# Patient Record
Sex: FEMALE | Race: WHITE | NOT HISPANIC OR LATINO | Employment: FULL TIME | URBAN - METROPOLITAN AREA
[De-identification: names, ages, dates, MRNs, and addresses within clinical notes are randomized per-mention and may not be internally consistent; named-entity substitution may affect disease eponyms.]

---

## 2017-01-03 ENCOUNTER — TELEPHONE (OUTPATIENT)
Dept: NEUROLOGY | Facility: CLINIC | Age: 47
End: 2017-01-03

## 2017-01-03 NOTE — TELEPHONE ENCOUNTER
----- Message from Danitza Matta sent at 1/3/2017  3:19 PM CST -----  Contact: Patient herself  _  1st Request  X  2nd Request  _  3rd Request    Please refill the medication(s) listed below. Please call the patient when the prescription(s) is ready for  at the phone number [phone] .    Medication #1  Verpamil (CALAN - SR) 120 MG      Preferred Pharmacy:  [id number] - [place] - [id number] Replaced by [facility]

## 2017-01-20 ENCOUNTER — OFFICE VISIT (OUTPATIENT)
Dept: UROLOGY | Facility: CLINIC | Age: 47
End: 2017-01-20
Payer: COMMERCIAL

## 2017-01-20 ENCOUNTER — TELEPHONE (OUTPATIENT)
Dept: UROLOGY | Facility: CLINIC | Age: 47
End: 2017-01-20

## 2017-01-20 VITALS
HEART RATE: 70 BPM | SYSTOLIC BLOOD PRESSURE: 137 MMHG | WEIGHT: 203.94 LBS | BODY MASS INDEX: 33.98 KG/M2 | DIASTOLIC BLOOD PRESSURE: 96 MMHG | HEIGHT: 65 IN

## 2017-01-20 DIAGNOSIS — N30.10 INTERSTITIAL CYSTITIS: Primary | ICD-10-CM

## 2017-01-20 PROCEDURE — 99213 OFFICE O/P EST LOW 20 MIN: CPT | Mod: 25,S$GLB,, | Performed by: UROLOGY

## 2017-01-20 PROCEDURE — 99999 PR PBB SHADOW E&M-EST. PATIENT-LVL III: CPT | Mod: PBBFAC,,, | Performed by: UROLOGY

## 2017-01-20 PROCEDURE — 81002 URINALYSIS NONAUTO W/O SCOPE: CPT | Mod: S$GLB,,, | Performed by: UROLOGY

## 2017-01-20 NOTE — PROGRESS NOTES
CHIEF COMPLAINT:    Mrs. Bonilla is a 46 y.o. female presenting for a follow up on IC.    PRESENTING ILLNESS:    Jennifer Bonilla is a 46 y.o. female who was last seen in 2/2015, returns today stating that she had a strep throat in November, then had enteritis.  She then developed a UTI, was treated with Macrobid at the end of December.  Last week, she had another UTI (no cultures have been done) and is again on Macrobid.  She has resolution of the dysuria, frequency, urgency and pain when she takes the antibiotic.  Is has been several years since she has had a cystoscopy with hydrodistension and she would like to have another.  She states that the Macrobid has helped her symptoms such that she does not feel like she needs an instillation today.  (When she first had IC, she used to do instillations at home.)  She is a surgical tech and works long hours, feels that her bladder is more comfortable when she is able to drink water but finds it challenging with the work schedule.        REVIEW OF SYSTEMS:    Review of Systems   Constitutional: Negative.    HENT: Negative.    Eyes: Negative.    Respiratory: Negative.    Cardiovascular: Negative.    Gastrointestinal: Positive for abdominal pain.   Genitourinary: Positive for dysuria, frequency and urgency.        Suprapubic tenderness   Musculoskeletal: Positive for myalgias.        Foot pain and leg pain from standing all day   Skin: Negative.    Neurological: Negative.    Endo/Heme/Allergies: Does not bruise/bleed easily.   Psychiatric/Behavioral: Negative.      PATIENT HISTORY:    Past Medical History   Diagnosis Date    Acid reflux     Arrhythmia     Cystitis, interstitial     TMJ (temporomandibular joint disorder)      jaw clicking       Past Surgical History   Procedure Laterality Date    Appendectomy      Cholecystectomy      Exploratory laparotomy w/ bowel resection       no surgery on bowels. x3 separate laparoscopies    Cystoscopy      Abdominal surgery       Radiofrequency ablation         Family History   Problem Relation Age of Onset    Cancer Father     Cancer Mother      Social History    Marital status:      Spouse name: N/A    Number of children: N/A    Years of education: N/A     Occupational History     [employer]     Social History Main Topics    Smoking status: Never Smoker    Smokeless tobacco: Never Used    Alcohol use 1.5 oz/week     3 Standard drinks or equivalent per week      Comment: socially    Drug use: No    Sexual activity: Yes     Partners: Male     Social History Narrative    Works at surgery at [employer].        Allergies:    Codeine and Capsicum    Medications:  Outpatient Encounter Prescriptions as of 1/20/2017   Medication Sig Dispense Refill    ondansetron (ZOFRAN-ODT) 4 MG TbDL Take 1 tablet (4 mg total) by mouth every 12 (twelve) hours as needed (nausea). 30 tablet 6    verapamil (CALAN-SR) 120 MG CR tablet TAKE ONE TABLET BY MOUTH IN THE EVENING 30 tablet 0    scopolamine (TRANSDERM-SCOP) 1.5 mg (1 mg over 3 days) Place 1 patch (1.5 mg total) onto the skin every 72 hours. 7 patch 0     No facility-administered encounter medications on file as of 1/20/2017.          PHYSICAL EXAMINATION:    The patient generally appears in good health, is appropriately interactive, and is in no apparent distress.    Skin: No lesions.    Mental: Cooperative with normal affect.    Neuro: Grossly intact.    HEENT: Normal. No evidence of lymphadenopathy.    Chest: normal inspiratory effort.    Extremities: No clubbing, cyanosis, or edema      LABS:    UA 1.015, pH 5, otherwise, negative    IMPRESSION:    Encounter Diagnoses   Name Primary?    Interstitial cystitis Yes       PLAN:    1.  Consent signed for cystoscopy, hydrodistension, possible bladder biopsy and fulguration.

## 2017-01-23 ENCOUNTER — ANESTHESIA EVENT (OUTPATIENT)
Dept: SURGERY | Facility: HOSPITAL | Age: 47
End: 2017-01-23
Payer: COMMERCIAL

## 2017-01-23 NOTE — ANESTHESIA PREPROCEDURE EVALUATION
Pre Admission Screening  Melba Hopkins RN      []Hide copied text  Anesthesia Assessment: Preoperative EQUATION     Planned Procedure: Procedure(s) (LRB):  CYSTOSCOPY W/BLADDER BIOPSY,POSSIBLE FULGURATION-BLADDER (N/A)  CYSTOSCOPY WITH HYDRODISTENSION (N/A)  Requested Anesthesia Type:General  Surgeon: Enid Lester MD  Service: Urology  Known or anticipated Date of Surgery:1/31/2017     Surgeon notes: reviewed     Electronic QUestionnaire Assessment completed via nurse interview with patient.                    Jennifer Bonilla [[id number]] - 46 y.o. Female         Providers Outside of [facility]       No data to display        Surgical Risk Level       Surgical Risk Level:   2              caRDScore (Clinical Anesthesia Rapid Decision Score)         Very Low  Total Score: 8       8 Sum of Clinical Scores        caRDScores (Grouped)       caRDScore - Ane:   3                       caRDScore - CVD:   1                       caRDScore - Pul:   0                       caRDScore - Met:   0                       caRDScore - Phy:   4              caRDScore Items             Pre-admit from 1/31/2017 in [facility]      Anesthesia        Has decreased jaw mobility/TMJ dysfunction   Yes      Has GERD, hiatal hernia, or chronic heartburn/dyspepsia requiring Rx some or all times   Yes      CVD        Activity similar to best ability for maximal activity or exercise   METS 4      Diagnosed with high blood pressure   Yes      Typical BP runs <150/90   Yes      Pulmonary        Metabolic        Physiologic        Has acute infection now / taking antibiotics   Yes      Has recent Dx of urine infection   Yes [on macrobid to prevent recurrent UTI]      Obesity Status   Mild Obesity (BMI 30-34.9)      Has problems emptying bladder/ u. retent. due to nerve/prostate/bladder/pelvic dis.   Yes        Flags       Red Flag Score:   0                       Yellow Flag Score:   4              Red Flags              Pre-admit from 1/31/2017 in [facility]      Obesity Status   Mild Obesity (BMI 30-34.9)        Yellow Flags             Pre-admit from 1/31/2017 in [facility]      Has acute infection now / taking antibiotics   Yes      Has recent Dx of urine infection   Yes [on macrobid to prevent recurrent UTI]      Obesity Status   Mild Obesity (BMI 30-34.9)      Has decreased jaw mobility/TMJ dysfunction   Yes      Has pain   Yes        PONV Risk Score (assumes periop narcotic use = +1, Max=4)       PONV Risk Score:   3              PONV Risk Factors  Total Score: 2       1 Female      1 Non-Smoker at present        Sleep Apnea  Total Score: 0         ESTELLE STOP-Bang Risk Factors (Max=8)  Total Score: 1       1 Takes medication for high blood pressure        ESTELLE Risk Level - 1 (Low), 2 (Moderate), 3 (High)       ESTELLE Risk Level:   1              RCRI (Revised Cardiac Risk Indices of ACC/AHA guidelines, Max=6)  Total Score: 0         CAD Risk Factors  Total Score: 1       1 Diagnosed with high blood pressure        CHADS Score if applicable (history of atrial fib/flutter, Max=6)  Total Score: 1       1 Diagnosed with high blood pressure        Maximal Exercise Capacity             Pre-admit from 1/31/2017 in [facility]      Maximal Exercise Capacity   METS 4        Summary of Dependence  Total Score: 1       1 Is totally independent of others for activities of daily living        Phone Fraility Score (Max = 17)  Total Score: 0         Pain Factors             Pre-admit from 1/31/2017 in [facility]      Has pain   Yes      Location and description of pain   occ migraines        Risk Triggers (Evidence-Based Risk Triggers)         Pulmonary Risk Triggers  Total Score: 1       1 Obesity Status: Mild Obesity (BMI 30-34.9)        Renal Risk Triggers  Total Score: 1       1 Diagnosed with high blood pressure        Delirium Risk Triggers  Total Score: 0          Urologic Risk Triggers  Total Score: 2       1 Has recent Dx of urine infection      1 Has problems emptying bladder/ u. retent. due to nerve/prostate/bladder/pelvic dis.        Logistics         Pre-op Clinic Logistics  Total Score: 4       2 Has recent Dx of urine infection      1 Has had anesthesia, either as adult or as a child      1 Takes medication for high blood pressure        DOSC Logistics  Total Score: 0         Discharge Logistics  Total Score: 0         Discharge Planning             Pre-admit from 1/31/2017 in [facility]      Discharge Planning        Will assist patient 24/7, if needed         Who will transport you to therapy, if need   [facility]        Fast Track <For office use only>       Total Score: 7          Surgical Risk Level Assessment                       Triage considerations:      The patient has no apparent active cardiac condition (No unstable coronary Syndrome such as severe unstable angina or recent [<1 month] myocardial infarction, decompensated CHF, severe valvular disease or significant arrhythmia)     Previous anesthesia records:GETA, LMA General and No problems- Pt has TMJ.  Endometrial ablation:  08/19/13; Placement Time: 0741; Method of Intubation: Other (Comment); Inserted by: CRNA; Airway Device: LMA; Mask Ventilation: Easy; Intubated: Postinduction; Airway Device Size: 4.0; Style: Cuffed; Cuff Inflation: Minimal occlusive pressure; Placement Verified By: Auscultation, Capnometry; Grade: Grade I; Intubation Findings: Positive EtCO2, Bilateral breath sounds; Securment: Lips; Complications: None; Breath Sounds: Equal Bilateral; Insertion Attempts: 1; Removal Date: 08/19/13;      Last PCP note: > 1 year ago , within [facility]   Subspecialty notes: Cardiology: General, Neurology     Other important co-morbidities: obesity, HX PVCs (WPW and Brugada syndrome were ruled out), GERD      Tests already available:  Available tests, 6-12 months ago . 3/25 CBC,  [facility]. 2014 EKG      Instructions given. (See in Nurse's note)     Optimization:  Anesthesia Preop Clinic Assessment Indicated-not required for fast track pt      Plan:   Testing: [facility]  Patient has previously scheduled Medical Appointment:none     Navigation: Tests Scheduled. Am of surgery      Straight Line to surgery.       Electronically signed by Melba Hopkins RN at 1/23/2017  3:08 PM        Pre-admit on 1/31/2017              Detailed Report                                                                                                                        01/23/2017  Jennifer Bonilla is a 46 y.o., female.    [facility] Anesthesia Evaluation         Review of Systems      Physical Exam  General:  Well nourished    Airway/Jaw/Neck:  Airway Findings: Mouth Opening: Normal Tongue: Normal  General Airway Assessment: Adult  Mallampati: II  Improves to II with phonation.  TM Distance: Normal, at least 6 cm      Dental:  Dental Findings: In tact   Chest/Lungs:  Chest/Lungs Findings: Clear to auscultation     Heart/Vascular:  Heart Findings: Rate: Normal  Rhythm: Regular Rhythm  Sounds: Normal        Mental Status:  Mental Status Findings:  Cooperative, Alert and Oriented         Anesthesia Plan  Type of Anesthesia, risks & benefits discussed:  Anesthesia Type:  general  Patient's Preference: General  Intra-op Monitoring Plan: standard ASA monitors  Intra-op Monitoring Plan Comments:   Post Op Pain Control Plan:   Post Op Pain Control Plan Comments: Per primary service.   Induction:   IV  Beta Blocker:  Patient is not currently on a Beta-Blocker (No further documentation required).       Informed Consent: Patient understands risks and agrees with Anesthesia plan.  Questions answered. Anesthesia consent signed with patient.  ASA Score: 2     Day of Surgery Review of History & Physical:    H&P update referred to the surgeon.     Anesthesia Plan Notes: Monitoring and airway management plans reviewed.         Ready For Surgery  From Anesthesia Perspective.     Patient Active Problem List   Diagnosis    Menorrhagia    Palpitations    Pharyngitis    RBBB    Interstitial cystitis    TMJ (temporomandibular joint syndrome)

## 2017-01-23 NOTE — PRE ADMISSION SCREENING
Anesthesia Assessment: Preoperative EQUATION    Planned Procedure: Procedure(s) (LRB):  CYSTOSCOPY W/BLADDER BIOPSY,POSSIBLE FULGURATION-BLADDER (N/A)  CYSTOSCOPY WITH HYDRODISTENSION (N/A)  Requested Anesthesia Type:General  Surgeon: Enid Lester MD  Service: Urology  Known or anticipated Date of Surgery:1/31/2017    Surgeon notes: reviewed    Electronic QUestionnaire Assessment completed via nurse interview with patient.        Jennifer Bonilla [[id number]] - 46 y.o. Female        Providers Outside of [facility]      No data to display       Surgical Risk Level      Surgical Risk Level:  2           caRDScore (Clinical Anesthesia Rapid Decision Score)        Very Low  Total Score: 8      8 Sum of Clinical Scores       caRDScores (Grouped)      caRDScore - Ane:  3                caRDScore - CVD:  1                caRDScore - Pul:  0                caRDScore - Met:  0                caRDScore - Phy:  4           caRDScore Items           Pre-admit from 1/31/2017 in [facility]     Anesthesia      Has decreased jaw mobility/TMJ dysfunction  Yes     Has GERD, hiatal hernia, or chronic heartburn/dyspepsia requiring Rx some or all times  Yes     CVD      Activity similar to best ability for maximal activity or exercise  METS 4     Diagnosed with high blood pressure  Yes     Typical BP runs <150/90  Yes     Pulmonary      Metabolic      Physiologic      Has acute infection now / taking antibiotics  Yes     Has recent Dx of urine infection  Yes [on macrobid to prevent recurrent UTI]     Obesity Status  Mild Obesity (BMI 30-34.9)     Has problems emptying bladder/ u. retent. due to nerve/prostate/bladder/pelvic dis.  Yes       Flags      Red Flag Score:  0                Yellow Flag Score:  4           Red Flags           Pre-admit from 1/31/2017 in [facility]     Obesity Status  Mild Obesity (BMI 30-34.9)       Yellow Flags           [facility]     Has acute infection now / taking antibiotics  Yes     Has recent Dx of urine infection  Yes [on macrobid to prevent recurrent UTI]     Obesity Status  Mild Obesity (BMI 30-34.9)     Has decreased jaw mobility/TMJ dysfunction  Yes     Has pain  Yes       PONV Risk Score (assumes periop narcotic use = +1, Max=4)      PONV Risk Score:  3           PONV Risk Factors  Total Score: 2      1 Female     1 Non-Smoker at present       Sleep Apnea  Total Score: 0        ESTELLE STOP-Bang Risk Factors (Max=8)  Total Score: 1      1 Takes medication for high blood pressure       ESTELLE Risk Level - 1 (Low), 2 (Moderate), 3 (High)      ESTELLE Risk Level:  1           RCRI (Revised Cardiac Risk Indices of ACC/AHA guidelines, Max=6)  Total Score: 0        CAD Risk Factors  Total Score: 1      1 Diagnosed with high blood pressure       CHADS Score if applicable (history of atrial fib/flutter, Max=6)  Total Score: 1      1 Diagnosed with high blood pressure       Maximal Exercise Capacity           Pre-admit from 1/31/2017 in [facility]     Maximal Exercise Capacity  METS 4       Summary of Dependence  Total Score: 1      1 Is totally independent of others for activities of daily living       Phone Fraility Score (Max = 17)  Total Score: 0        Pain Factors           Pre-admit from 1/31/2017 in [facility]     Has pain  Yes     Location and description of pain  occ migraines       Risk Triggers (Evidence-Based Risk Triggers)        Pulmonary Risk Triggers  Total Score: 1      1 Obesity Status: Mild Obesity (BMI 30-34.9)       Renal Risk Triggers  Total Score: 1      1 Diagnosed with high blood pressure       Delirium Risk Triggers  Total Score: 0        Urologic Risk Triggers  Total Score: 2      1 Has recent Dx of urine infection     1 Has problems emptying bladder/ u. retent. due to nerve/prostate/bladder/pelvic dis.       Logistics        Pre-op Clinic Logistics  Total Score: 4       2 Has recent Dx of urine infection     1 Has had anesthesia, either as adult or as a child     1 Takes medication for high blood pressure       DOSC Logistics  Total Score: 0        Discharge Logistics  Total Score: 0        Discharge Planning           Pre-admit from 1/31/2017 in [facility]     Discharge Planning      Will assist patient 24/7, if needed       [facility], [facility]       Fast Track <For office use only>      Total Score: 7        Surgical Risk Level Assessment                 Triage considerations:     The patient has no apparent active cardiac condition (No unstable coronary Syndrome such as severe unstable angina or recent [<1 month] myocardial infarction, decompensated CHF, severe valvular   disease or significant arrhythmia)    Previous anesthesia records:GETA, LMA General and No problems- Pt has TMJ.  Endometrial ablation:  08/19/13; Placement Time: 0741; Method of Intubation: Other (Comment); Inserted by: CRNA; Airway Device: LMA; Mask Ventilation: Easy; Intubated: Postinduction; Airway Device Size: 4.0; Style: Cuffed; Cuff Inflation: Minimal occlusive pressure; Placement Verified By: Auscultation, Capnometry; Grade: Grade I; Intubation Findings: Positive EtCO2, Bilateral breath sounds; Securment: Lips; Complications: None; Breath Sounds: Equal Bilateral; Insertion Attempts: 1; Removal Date: 08/19/13;     Last PCP note: > 1 year ago , within [facility]   Subspecialty notes: Cardiology: General, Neurology    Other important co-morbidities: obesity, HX PVCs  (WPW and Brugada syndrome were ruled out), GERD     Tests already available:  Available tests,  6-12 months ago . 3/25 CBC, BMP. 2014 EKG            Instructions given. (See in Nurse's note)    Optimization:  Anesthesia Preop Clinic Assessment  Indicated-not required for fast track pt       Plan:    Testing:  BMP      Patient  has previously scheduled Medical  Appointment:none    Navigation: Tests Scheduled. Am of surgery                         Straight Line to surgery.

## 2017-01-30 ENCOUNTER — TELEPHONE (OUTPATIENT)
Dept: UROLOGY | Facility: CLINIC | Age: 47
End: 2017-01-30

## 2017-01-31 ENCOUNTER — HOSPITAL ENCOUNTER (OUTPATIENT)
Facility: HOSPITAL | Age: 47
Discharge: HOME | End: 2017-01-31
Attending: UROLOGY | Admitting: UROLOGY
Payer: COMMERCIAL

## 2017-01-31 ENCOUNTER — ANESTHESIA (OUTPATIENT)
Dept: SURGERY | Facility: HOSPITAL | Age: 47
End: 2017-01-31
Payer: COMMERCIAL

## 2017-01-31 ENCOUNTER — SURGERY (OUTPATIENT)
Age: 47
End: 2017-01-31

## 2017-01-31 VITALS
HEART RATE: 58 BPM | BODY MASS INDEX: 32.99 KG/M2 | RESPIRATION RATE: 16 BRPM | SYSTOLIC BLOOD PRESSURE: 153 MMHG | OXYGEN SATURATION: 100 % | HEIGHT: 65 IN | DIASTOLIC BLOOD PRESSURE: 92 MMHG | TEMPERATURE: 97 F | WEIGHT: 198 LBS

## 2017-01-31 DIAGNOSIS — N30.10 INTERSTITIAL CYSTITIS: ICD-10-CM

## 2017-01-31 LAB
B-HCG UR QL: NEGATIVE
CTP QC/QA: YES

## 2017-01-31 PROCEDURE — 36000707: Performed by: UROLOGY

## 2017-01-31 PROCEDURE — 27200971 HC CYSTO SUPPLY II (SCOPE PRCDR.): Performed by: UROLOGY

## 2017-01-31 PROCEDURE — D9220A PRA ANESTHESIA: Mod: ANES,,, | Performed by: ANESTHESIOLOGY

## 2017-01-31 PROCEDURE — 36000706: Performed by: UROLOGY

## 2017-01-31 PROCEDURE — 52260 CYSTOSCOPY AND TREATMENT: CPT | Mod: ,,, | Performed by: UROLOGY

## 2017-01-31 PROCEDURE — 25000003 PHARM REV CODE 250: Performed by: STUDENT IN AN ORGANIZED HEALTH CARE EDUCATION/TRAINING PROGRAM

## 2017-01-31 PROCEDURE — 27200921 HC BAG, CYSTO DRAINAGE: Performed by: UROLOGY

## 2017-01-31 PROCEDURE — 37000009 HC ANESTHESIA EA ADD 15 MINS: Performed by: UROLOGY

## 2017-01-31 PROCEDURE — 25000003 PHARM REV CODE 250: Performed by: NURSE ANESTHETIST, CERTIFIED REGISTERED

## 2017-01-31 PROCEDURE — 25000003 PHARM REV CODE 250: Performed by: UROLOGY

## 2017-01-31 PROCEDURE — 63600175 PHARM REV CODE 636 W HCPCS: Performed by: NURSE ANESTHETIST, CERTIFIED REGISTERED

## 2017-01-31 PROCEDURE — 81025 URINE PREGNANCY TEST: CPT | Performed by: UROLOGY

## 2017-01-31 PROCEDURE — 37000008 HC ANESTHESIA 1ST 15 MINUTES: Performed by: UROLOGY

## 2017-01-31 PROCEDURE — 71000015 HC POSTOP RECOV 1ST HR: Performed by: UROLOGY

## 2017-01-31 PROCEDURE — D9220A PRA ANESTHESIA: Mod: CRNA,,, | Performed by: NURSE ANESTHETIST, CERTIFIED REGISTERED

## 2017-01-31 PROCEDURE — 27200651 HC AIRWAY, LMA: Performed by: NURSE ANESTHETIST, CERTIFIED REGISTERED

## 2017-01-31 PROCEDURE — 71000033 HC RECOVERY, INTIAL HOUR: Performed by: UROLOGY

## 2017-01-31 RX ORDER — FENTANYL CITRATE 50 UG/ML
INJECTION, SOLUTION INTRAMUSCULAR; INTRAVENOUS
Status: DISCONTINUED | OUTPATIENT
Start: 2017-01-31 | End: 2017-01-31

## 2017-01-31 RX ORDER — HYDROCODONE BITARTRATE AND ACETAMINOPHEN 5; 325 MG/1; MG/1
1-2 TABLET ORAL
Qty: 15 TABLET | Refills: 0 | Status: SHIPPED | OUTPATIENT
Start: 2017-01-31 | End: 2017-01-31 | Stop reason: HOSPADM

## 2017-01-31 RX ORDER — LIDOCAINE HCL/PF 100 MG/5ML
SYRINGE (ML) INTRAVENOUS
Status: DISCONTINUED | OUTPATIENT
Start: 2017-01-31 | End: 2017-01-31

## 2017-01-31 RX ORDER — PROPOFOL 10 MG/ML
VIAL (ML) INTRAVENOUS
Status: DISCONTINUED | OUTPATIENT
Start: 2017-01-31 | End: 2017-01-31

## 2017-01-31 RX ORDER — LIDOCAINE HYDROCHLORIDE 10 MG/ML
INJECTION, SOLUTION EPIDURAL; INFILTRATION; INTRACAUDAL; PERINEURAL
Status: DISCONTINUED
Start: 2017-01-31 | End: 2017-01-31 | Stop reason: HOSPADM

## 2017-01-31 RX ORDER — POLYETHYLENE GLYCOL 3350 17 G/17G
17 POWDER, FOR SOLUTION ORAL DAILY
Qty: 30 PACKET | Refills: 0 | Status: SHIPPED | OUTPATIENT
Start: 2017-01-31 | End: 2017-01-31 | Stop reason: HOSPADM

## 2017-01-31 RX ORDER — ONDANSETRON 2 MG/ML
INJECTION INTRAMUSCULAR; INTRAVENOUS
Status: DISCONTINUED | OUTPATIENT
Start: 2017-01-31 | End: 2017-01-31

## 2017-01-31 RX ORDER — HYDROCODONE BITARTRATE AND ACETAMINOPHEN 5; 325 MG/1; MG/1
1 TABLET ORAL EVERY 4 HOURS PRN
Status: DISCONTINUED | OUTPATIENT
Start: 2017-01-31 | End: 2017-01-31

## 2017-01-31 RX ORDER — LIDOCAINE HYDROCHLORIDE 10 MG/ML
1 INJECTION, SOLUTION EPIDURAL; INFILTRATION; INTRACAUDAL; PERINEURAL ONCE
Status: DISCONTINUED | OUTPATIENT
Start: 2017-01-31 | End: 2017-01-31 | Stop reason: HOSPADM

## 2017-01-31 RX ORDER — LIDOCAINE HYDROCHLORIDE 20 MG/ML
JELLY TOPICAL
Status: DISCONTINUED | OUTPATIENT
Start: 2017-01-31 | End: 2017-01-31 | Stop reason: HOSPADM

## 2017-01-31 RX ORDER — SODIUM CHLORIDE 9 MG/ML
INJECTION, SOLUTION INTRAVENOUS CONTINUOUS
Status: DISCONTINUED | OUTPATIENT
Start: 2017-01-31 | End: 2017-01-31 | Stop reason: HOSPADM

## 2017-01-31 RX ADMIN — FENTANYL CITRATE 25 MCG: 50 INJECTION, SOLUTION INTRAMUSCULAR; INTRAVENOUS at 06:01

## 2017-01-31 RX ADMIN — FENTANYL CITRATE 25 MCG: 50 INJECTION, SOLUTION INTRAMUSCULAR; INTRAVENOUS at 07:01

## 2017-01-31 RX ADMIN — ONDANSETRON 4 MG: 2 INJECTION INTRAMUSCULAR; INTRAVENOUS at 06:01

## 2017-01-31 RX ADMIN — LIDOCAINE HYDROCHLORIDE 10 ML: 20 JELLY TOPICAL at 06:01

## 2017-01-31 RX ADMIN — Medication 2 G: at 07:01

## 2017-01-31 RX ADMIN — SODIUM CHLORIDE: 0.9 INJECTION, SOLUTION INTRAVENOUS at 06:01

## 2017-01-31 RX ADMIN — LIDOCAINE HYDROCHLORIDE 60 MG: 20 INJECTION, SOLUTION INTRAVENOUS at 07:01

## 2017-01-31 RX ADMIN — PROPOFOL 200 MG: 10 INJECTION, EMULSION INTRAVENOUS at 07:01

## 2017-01-31 NOTE — INTERVAL H&P NOTE
The patient has been examined and the H&P has been reviewed:    I concur with the findings and no changes have occurred since H&P was written.   To OR today for cystoscopy, hydrodistension, possible bladder biopsy and fulguration.    Anesthesia/Surgery risks, benefits and alternative options discussed and understood by patient/family.          Active Hospital Problems    Diagnosis  POA    Interstitial cystitis [N30.10]  Yes      Resolved Hospital Problems    Diagnosis Date Resolved POA   No resolved problems to display.     Patient seen in holding.  No changes in clinical condition.  Proceed with planned procedure.

## 2017-01-31 NOTE — PROGRESS NOTES
Dr. Gee stated pt did not need bmp that was not drawn in preop. Pt does not want any Rxs for narcotics for pain.

## 2017-01-31 NOTE — H&P (VIEW-ONLY)
CHIEF COMPLAINT:    Mrs. Bonilla is a 46 y.o. female presenting for a follow up on IC.    PRESENTING ILLNESS:    Jennifer Bonilla is a 46 y.o. female who was last seen in 2/2015, returns today stating that she had a strep throat in November, then had enteritis.  She then developed a UTI, was treated with Macrobid at the end of December.  Last week, she had another UTI (no cultures have been done) and is again on Macrobid.  She has resolution of the dysuria, frequency, urgency and pain when she takes the antibiotic.  Is has been several years since she has had a cystoscopy with hydrodistension and she would like to have another.  She states that the Macrobid has helped her symptoms such that she does not feel like she needs an instillation today.  (When she first had IC, she used to do instillations at home.)  She is a surgical tech and works long hours, feels that her bladder is more comfortable when she is able to drink water but finds it challenging with the work schedule.        REVIEW OF SYSTEMS:    Review of Systems   Constitutional: Negative.    HENT: Negative.    Eyes: Negative.    Respiratory: Negative.    Cardiovascular: Negative.    Gastrointestinal: Positive for abdominal pain.   Genitourinary: Positive for dysuria, frequency and urgency.        Suprapubic tenderness   Musculoskeletal: Positive for myalgias.        Foot pain and leg pain from standing all day   Skin: Negative.    Neurological: Negative.    Endo/Heme/Allergies: Does not bruise/bleed easily.   Psychiatric/Behavioral: Negative.      PATIENT HISTORY:    Past Medical History   Diagnosis Date    Acid reflux     Arrhythmia     Cystitis, interstitial     TMJ (temporomandibular joint disorder)      jaw clicking       Past Surgical History   Procedure Laterality Date    Appendectomy      Cholecystectomy      Exploratory laparotomy w/ bowel resection       no surgery on bowels. x3 separate laparoscopies    Cystoscopy      Abdominal surgery       Radiofrequency ablation         Family History   Problem Relation Age of Onset    Cancer Father     Cancer Mother      Social History    Marital status:      Spouse name: N/A    Number of children: N/A    Years of education: N/A     Occupational History     [facility] [employer]     Social History Main Topics    Smoking status: Never Smoker    Smokeless tobacco: Never Used    Alcohol use 1.5 oz/week     3 Standard drinks or equivalent per week      Comment: socially    Drug use: No    Sexual activity: Yes     Partners: Male     Social History Narrative    Works at surgery at [employer].        Allergies:    Codeine and Capsicum    Medications:  Outpatient Encounter Prescriptions as of 1/20/2017   Medication Sig Dispense Refill    ondansetron (ZOFRAN-ODT) 4 MG TbDL Take 1 tablet (4 mg total) by mouth every 12 (twelve) hours as needed (nausea). 30 tablet 6    verapamil (CALAN-SR) 120 MG CR tablet TAKE ONE TABLET BY MOUTH IN THE EVENING 30 tablet 0    scopolamine (TRANSDERM-SCOP) 1.5 mg (1 mg over 3 days) Place 1 patch (1.5 mg total) onto the skin every 72 hours. 7 patch 0     No facility-administered encounter medications on file as of 1/20/2017.          PHYSICAL EXAMINATION:    The patient generally appears in good health, is appropriately interactive, and is in no apparent distress.    Skin: No lesions.    Mental: Cooperative with normal affect.    Neuro: Grossly intact.    HEENT: Normal. No evidence of lymphadenopathy.    Chest: normal inspiratory effort.    Extremities: No clubbing, cyanosis, or edema      LABS:    UA 1.015, pH 5, otherwise, negative    IMPRESSION:    Encounter Diagnoses   Name Primary?    Interstitial cystitis Yes       PLAN:    1.  Consent signed for cystoscopy, hydrodistension, possible bladder biopsy and fulguration.

## 2017-01-31 NOTE — ANESTHESIA RELEASE NOTE
Anesthesia Release from PACU Note    Patient: Jennifer Bonilla    Procedure(s) Performed: Procedure(s) (LRB):  CYSTOSCOPY WITH HYDRODISTENSION (N/A)    Anesthesia type: general    Post pain: Adequate analgesia    Post assessment: no apparent anesthetic complications, tolerated procedure well and no evidence of recall    Post vital signs:   Vitals:    01/31/17 0800   BP: 133/67   Pulse: (!) 59   Resp: 16   Temp:         Level of consciousness: awake, alert  and oriented    Nausea/Vomiting: no nausea/no vomiting    Complications: none    Airway Patency: patent    Respiratory: unassisted, spontaneous ventilation    Cardiovascular: stable and blood pressure at baseline    Hydration: euvolemic

## 2017-01-31 NOTE — ANESTHESIA POSTPROCEDURE EVALUATION
"Anesthesia Post Evaluation    Patient: Jennifer Bonilla    Procedure(s) Performed: Procedure(s) (LRB):  CYSTOSCOPY WITH HYDRODISTENSION (N/A)    Final Anesthesia Type: general  Patient location during evaluation: PACU  Patient participation: Yes- Able to Participate  Level of consciousness: awake and alert  Post-procedure vital signs: reviewed and stable  Pain management: adequate  Airway patency: patent  PONV status at discharge: No PONV  Anesthetic complications: no      Cardiovascular status: hemodynamically stable  Respiratory status: unassisted  Hydration status: euvolemic  Follow-up not needed.        Visit Vitals    /67    Pulse (!) 59    Temp 36.2 °C (97.2 °F) (Skin)    Resp 16    Ht 5' 5" (1.651 m)    Wt 89.8 kg (198 lb)    SpO2 100%    Breastfeeding No    BMI 32.95 kg/m2       Pain/Tequila Score: Pain Assessment Performed: Yes (1/31/2017  7:32 AM)  Presence of Pain: denies (1/31/2017  7:32 AM)  Teuqila Score: 9 (1/31/2017  7:32 AM)      "

## 2017-01-31 NOTE — IP AVS SNAPSHOT
[facility]  [address]  [facility] [address]  Phone: [phone]           Patient Discharge Instructions     Our goal is to set you up for success. This packet includes information on your condition, medications, and your home care. It will help you to care for yourself so you don't get sicker and need to go back to the hospital.     Please ask your nurse if you have any questions.        There are many details to remember when preparing to leave the hospital. Here is what you will need to do:    1. Take your medicine. If you are prescribed medications, review your Medication List in the following pages. You may have new medications to  at the pharmacy and others that you'll need to stop taking. Review the instructions for how and when to take your medications. Talk with your doctor or nurses if you are unsure of what to do.     2. Go to your follow-up appointments. Specific follow-up information is listed in the following pages. Your may be contacted by a transition nurse or clinical provider about future appointments. Be sure we have all of the phone numbers to reach you, if needed. Please contact your provider's office if you are unable to make an appointment.     3. Watch for warning signs. Your doctor or nurse will give you detailed warning signs to watch for and when to call for assistance. These instructions may also include educational information about your condition. If you experience any of warning signs to your health, call your doctor.               [facility]  Unless otherwise directed by your provider, please contact [facility], our nurse care line that is available for 24/7 assistance.     [phone] (toll-free)    Registered nurses in the [facility] provide clinical advisement, health education, appointment booking, and other advisory services.                    ** Verify the list of medication(s) below is accurate and up  to date. Carry this with you in case of emergency. If your medications have changed, please notify your healthcare provider.             Medication List      CONTINUE taking these medications        Additional Info                      ondansetron 4 MG Tbdl   Commonly known as:  ZOFRAN-ODT   Quantity:  30 tablet   Refills:  6   Dose:  4 mg    Instructions:  Take 1 tablet (4 mg total) by mouth every 12 (twelve) hours as needed (nausea).     Begin Date    AM    Noon    PM    Bedtime       scopolamine 1.5 mg (1 mg over 3 days)   Commonly known as:  TRANSDERM-SCOP   Quantity:  7 patch   Refills:  0   Dose:  1 patch    Instructions:  Place 1 patch (1.5 mg total) onto the skin every 72 hours.     Begin Date    AM    Noon    PM    Bedtime       verapamil 120 MG CR tablet   Commonly known as:  CALAN-SR   Quantity:  30 tablet   Refills:  0    Instructions:  TAKE ONE TABLET BY MOUTH IN THE EVENING     Begin Date    AM    Noon    PM    Bedtime                  Please bring to all follow up appointments:    1. A copy of your discharge instructions.  2. All medicines you are currently taking in their original bottles.  3. Identification and insurance card.    Please arrive 15 minutes ahead of scheduled appointment time.    Please call 24 hours in advance if you must reschedule your appointment and/or time.        Your Scheduled Appointments     Feb 17, 2017  1:20 PM CST   Post OP with Enid Lester MD   [facility] - Urology 4th Floor ([facility] )    [address]  [place] [address] [phone]            Mar 07, 2017  4:00 PM CST   Neurology - Established Patient with Dixon Dial III, MD   [facility] - Neurology ([facility])    [address]  [place] [address] [phone]              Follow-up Information     Follow up with Enid Lester MD In 2 weeks.    Specialty:  Urology    Why:  post op cysto hydrodistention     Contact information:    [address] [facility]  [address]  [phone]          Discharge Instructions     Future Orders    Activity as tolerated     Call MD for:  difficulty breathing or increased cough     Call MD for:  increased confusion or weakness     Call MD for:  persistent dizziness, light-headedness, or visual disturbances     Call MD for:  persistent nausea and vomiting or diarrhea     Call MD for:  redness, tenderness, or signs of infection (pain, swelling, redness, odor or green/yellow discharge around incision site)     Call MD for:  severe persistent headache     Call MD for:  severe uncontrolled pain     Call MD for:  temperature >100.4     Call MD for:  worsening rash     Diet general     Questions:    Total calories:      Fat restriction, if any:      Protein restriction, if any:      Na restriction, if any:      Fluid restriction:      Additional restrictions:      No dressing needed         Discharge Instructions         Understanding Hydrodistention with Cystoscopy    Hydrodistention is a procedure that fills up your bladder with water. It is used to help find out what may be causing your bladder pain. During the procedure a long, thin tube (cystoscope) is used. It has a lens and a light on one end. This tube helps your healthcare provider see inside your bladder. It is put into your bladder through your urethra. Thats the tube that passes urine out of your body.  How to say it  [id number]   Why hydrodistention with cystoscopy is done  This procedure is often done to figure out the cause of bladder pain. It may help diagnose bladder pain syndrome (BPS), also called interstitial cystitis (IC). If you have this health problem, you may feel pain when your bladder fills with urine. You may also need to pass urine more often.  This procedure is also sometimes done to treat BPS. It may be tried if other treatments, such as medicine, dont work.  How hydrodistention with cystoscopy is done  This may be done in a hospital or  "at an outpatient facility. During the procedure:  · You are given medicine so you wont feel any pain. It may also make you fall asleep.  · Your healthcare provider puts the cystoscope into your urethra. He or she gently moves it forward into your bladder.  · Using the cystoscope, he or she slowly fills up your bladder with as much water as possible. This helps find out how much fluid your bladder can hold.   · Your bladder is then drained and filled again.  · Your healthcare provider may look at your bladder lining with the cystoscope. He or she will see if there are any sores or other possible causes for your symptoms.  · Your bladder is drained.  Risks of hydrodistention with cystoscopy  · Infection  · Symptoms get worse  © 8507-5432 [employer]. [address] [place] [address]. All rights reserved. This information is not intended as a substitute for professional medical care. Always follow your healthcare professional's instructions.            Primary Diagnosis     Your primary diagnosis was:  Interstitial Cystitis      Admission Information     Date & Time Provider Department CSN    1/31/2017  5:34 AM Enid Lester MD [facility] [id number]      Care Providers     Provider Role Specialty Primary office phone    Enid Lester MD Attending Provider Urology [phone]    Enid Lester MD Surgeon  Urology [phone]      Your Vitals Were     BP Pulse Temp Resp Height Weight    156/88 70 97.2 °F (36.2 °C) (Skin) 16 5' 5" (1.651 m) 89.8 kg (198 lb)    SpO2 BMI             100% 32.95 kg/m2         Recent Lab Values     No lab values to display.      Allergies as of 1/31/2017        Reactions    Codeine Anaphylaxis    Capsicum Other (See Comments)    others      Advance Directives     An advance directive is a document which, in the event you are no longer able to make decisions for yourself, tells your healthcare team what kind of treatment you do or do not " want to receive, or who you would like to make those decisions for you.  If you do not currently have an advance directive, [facility] encourages you to create one.  For more information call:  [phone]), [phone] ([phone]),  or log on to [email or web address].        Language Assistance Services     ATTENTION: Language assistance services are available, free of charge. Please call [phone].      ATENCIÓN: Si habla español, tiene a matthews disposición servicios gratuitos de asistencia lingüística. Llame al [phone].     CHÚ Ý: N?u b?n nói Ti?ng Vi?t, có các d?ch v? h? tr? ngôn ng? mi?n phí dành cho b?n. G?i s? [phone].         [facility] complies with applicable Federal civil rights laws and does not discriminate on the basis of race, color, national origin, age, disability, or sex.

## 2017-01-31 NOTE — OP NOTE
[facility]  Operative Note    Date: 01/31/2017    Pre-Op Diagnosis: interstitial cystitis  Patient Active Problem List    Diagnosis Date Noted    TMJ (temporomandibular joint syndrome) 04/14/2015    Interstitial cystitis 09/22/2014    RBBB 05/09/2014    Pharyngitis 04/11/2014    Palpitations 04/01/2014    Menorrhagia 08/19/2013         Post-Op Diagnosis: same    Procedure(s) Performed:   1.  Cystoscopy with hydrodistension    Specimen(s): none    Staff Surgeon: Enid Lester MD    Assistant Surgeon: Johnathon Gee MD    Anesthesia: General LMA anesthesia    Indications: Jennifer Bonilla is a 46 y.o. female with interstitial cystitis presenting for hydrodistension.      Findings:   - 600 cc anesthetic bladder capacity  - squamous metaplasia of the trigone  - no Hunner ulcers  - terminal hematuria noted  - glomerulations in all four quadrants     Estimated Blood Loss: min    Drains:  none    Procedure in detail: After risks, benefits and possible complications of hydro distention were discussed with the patient informed consent was obtained. All questions were answered in the pre-operative area.  The patient was transferred to the cystoscopy suite and placed in the supine position.  SCDs were applied and working.  Anesthesia was administered.  After adequate anesthesia the patient was placed in the dorsal lithotomy position and prepped and draped in the usual sterile fashion. Time out was preformed and yvette-procedural antibiotics were confirmed.     A rigid cystoscope in a 22 Fr sheath was introduced into the bladder per urethra. This passed easily.  The entire urethra was visualized which showed no masses or strictures.  The right and left ureteral orifices were identified in the normal anatomic position and were seen effluxing clear urine.  Formal cystoscopy was performed with both 30 and 70 degree lenses, which revealed no masses or lesions suspicious for malignancy, no trabeculations,  no bladder stones and no bladder diverticuli.  There was squamous metaplasia on the trigone near the ureteral orifices.      The bladder was then filled with sterile water until equilibrium was reached at 80 cm of water.  This distention was held for 8 minutes.  The capacity of the bladder was 600cc.  There was terminal hematuria seen. There were not ulcerations seen. There were glomerulizations seen in all four quadrants.    The bladder was drained and the cystoscope removed.  Viscous lidocaine was injected into the bladder.     The patient was removed from lithotomy and transferred to recovery in stable condition.    Disposition:  The patient will follow up with Dr. Lester in 2 weeks.  She was not given prescription for narcotic pain medication because she said she didn't need it.     Johnathon Gee MD    I was present for the entire case and agree with the above note.

## 2017-01-31 NOTE — PLAN OF CARE
Discharge instructions reviewed w/ pt and spouse, verbalized understanding. Pt in NADN.No complaints at this time. Tolerated liquids. Awaiting pt to urinate before d/c home. To be d/c'd home w/ spouse.

## 2017-01-31 NOTE — DISCHARGE SUMMARY
[facility]  Discharge Note  Short Stay    Admit Date: 1/31/2017    Discharge Date and Time: 1/31/2017       Attending Physician: Enid Lester MD     Discharge Provider: Johnathon Gee MD    Diagnoses:  Active Hospital Problems    Diagnosis  POA    *Interstitial cystitis [N30.10]  Yes      Resolved Hospital Problems    Diagnosis Date Resolved POA   No resolved problems to display.       Discharged Condition: good    Hospital Course: Patient was admitted for an outpatient procedure and tolerated the procedure well with no complications.    Final Diagnoses: Same as principal problem.    Disposition: Home or Self Care    Follow up/Patient Instructions:    Medications:  Reconciled Home Medications:   Current Discharge Medication List      START taking these medications    Details   hydrocodone-acetaminophen 5-325mg (NORCO) 5-325 mg per tablet Take 1-2 tablets by mouth every 4 to 6 hours as needed for Pain.  Qty: 15 tablet, Refills: 0      polyethylene glycol (GLYCOLAX) 17 gram PwPk Take 17 g by mouth once daily.  Qty: 30 packet, Refills: 0         CONTINUE these medications which have NOT CHANGED    Details   ondansetron (ZOFRAN-ODT) 4 MG TbDL Take 1 tablet (4 mg total) by mouth every 12 (twelve) hours as needed (nausea).  Qty: 30 tablet, Refills: 6    Associated Diagnoses: Chronic tension-type headache, not intractable; Chronic migraine without aura without status migrainosus, not intractable      verapamil (CALAN-SR) 120 MG CR tablet TAKE ONE TABLET BY MOUTH IN THE EVENING  Qty: 30 tablet, Refills: 0    Associated Diagnoses: Chronic tension-type headache, not intractable; Chronic migraine without aura without status migrainosus, not intractable      scopolamine (TRANSDERM-SCOP) 1.5 mg (1 mg over 3 days) Place 1 patch (1.5 mg total) onto the skin every 72 hours.  Qty: 7 patch, Refills: 0    Associated Diagnoses: Chronic migraine without aura without status migrainosus, not intractable; Nausea              Discharge Procedure Orders  Diet general     Activity as tolerated     Call MD for:  temperature >100.4     Call MD for:  persistent nausea and vomiting or diarrhea     Call MD for:  severe uncontrolled pain     Call MD for:  redness, tenderness, or signs of infection (pain, swelling, redness, odor or green/yellow discharge around incision site)     Call MD for:  difficulty breathing or increased cough     Call MD for:  severe persistent headache     Call MD for:  worsening rash     Call MD for:  persistent dizziness, light-headedness, or visual disturbances     Call MD for:  increased confusion or weakness     No dressing needed       Follow-up Information     Follow up with Enid Lester MD In 2 weeks.    Specialty:  Urology    Why:  post op cysto hydrodistention     Contact information:    Misha Yang [facility] [id number] [phone]            Discharge Procedure Orders (must include Diet, Follow-up, Activity):    Discharge Procedure Orders (must include Diet, Follow-up, Activity)  Diet general     Activity as tolerated     Call MD for:  temperature >100.4     Call MD for:  persistent nausea and vomiting or diarrhea     Call MD for:  severe uncontrolled pain     Call MD for:  redness, tenderness, or signs of infection (pain, swelling, redness, odor or green/yellow discharge around incision site)     Call MD for:  difficulty breathing or increased cough     Call MD for:  severe persistent headache     Call MD for:  worsening rash     Call MD for:  persistent dizziness, light-headedness, or visual disturbances     Call MD for:  increased confusion or weakness     No dressing needed        As above.

## 2017-01-31 NOTE — TRANSFER OF CARE
"Anesthesia Transfer of Care Note    Patient: Jennifer Bonilla    Procedure(s) Performed: Procedure(s) (LRB):  CYSTOSCOPY WITH HYDRODISTENSION (N/A)    Patient location: PACU    Anesthesia Type: general    Transport from OR: Transported from OR on room air with adequate spontaneous ventilation    Post pain: adequate analgesia    Post assessment: no apparent anesthetic complications    Post vital signs: stable    Level of consciousness: awake, alert and oriented    Nausea/Vomiting: no nausea/vomiting    Complications: none          Last vitals:   Visit Vitals    BP (!) 156/88    Pulse 70    Temp 36.2 °C (97.2 °F) (Skin)    Resp 16    Ht 5' 5" (1.651 m)    Wt 89.8 kg (198 lb)    SpO2 100%    Breastfeeding No    BMI 32.95 kg/m2     "

## 2017-01-31 NOTE — DISCHARGE INSTRUCTIONS
Understanding Hydrodistention with Cystoscopy    Hydrodistention is a procedure that fills up your bladder with water. It is used to help find out what may be causing your bladder pain. During the procedure a long, thin tube (cystoscope) is used. It has a lens and a light on one end. This tube helps your healthcare provider see inside your bladder. It is put into your bladder through your urethra. Thats the tube that passes urine out of your body.  How to say it  [id number] [id number]   Why hydrodistention with cystoscopy is done  This procedure is often done to figure out the cause of bladder pain. It may help diagnose bladder pain syndrome (BPS), also called interstitial cystitis (IC). If you have this health problem, you may feel pain when your bladder fills with urine. You may also need to pass urine more often.  This procedure is also sometimes done to treat BPS. It may be tried if other treatments, such as medicine, dont work.  How hydrodistention with cystoscopy is done  This may be done in a hospital or at an outpatient facility. During the procedure:  · You are given medicine so you wont feel any pain. It may also make you fall asleep.  · Your healthcare provider puts the cystoscope into your urethra. He or she gently moves it forward into your bladder.  · Using the cystoscope, he or she slowly fills up your bladder with as much water as possible. This helps find out how much fluid your bladder can hold.   · Your bladder is then drained and filled again.  · Your healthcare provider may look at your bladder lining with the cystoscope. He or she will see if there are any sores or other possible causes for your symptoms.  · Your bladder is drained.  Risks of hydrodistention with cystoscopy  · Infection  · Symptoms get worse  © 0183-4402 [employer]. [address] [place] [address], [place] [address]. All rights reserved. This information is not intended as a substitute for  professional medical care. Always follow your healthcare professional's instructions.

## 2017-02-06 DIAGNOSIS — G43.709 CHRONIC MIGRAINE WITHOUT AURA WITHOUT STATUS MIGRAINOSUS, NOT INTRACTABLE: ICD-10-CM

## 2017-02-06 DIAGNOSIS — G44.229 CHRONIC TENSION-TYPE HEADACHE, NOT INTRACTABLE: ICD-10-CM

## 2017-02-07 RX ORDER — VERAPAMIL HYDROCHLORIDE 120 MG/1
CAPSULE, EXTENDED RELEASE ORAL
Qty: 30 CAPSULE | Refills: 0 | Status: SHIPPED | OUTPATIENT
Start: 2017-02-07 | End: 2017-03-07 | Stop reason: SDUPTHER

## 2017-02-17 ENCOUNTER — OFFICE VISIT (OUTPATIENT)
Dept: UROLOGY | Facility: CLINIC | Age: 47
End: 2017-02-17
Payer: COMMERCIAL

## 2017-02-17 VITALS
HEIGHT: 65 IN | DIASTOLIC BLOOD PRESSURE: 83 MMHG | WEIGHT: 204.81 LBS | SYSTOLIC BLOOD PRESSURE: 142 MMHG | BODY MASS INDEX: 34.12 KG/M2 | HEART RATE: 72 BPM

## 2017-02-17 DIAGNOSIS — N30.10 INTERSTITIAL CYSTITIS: Primary | ICD-10-CM

## 2017-02-17 DIAGNOSIS — Z98.890 POST-OPERATIVE STATE: ICD-10-CM

## 2017-02-17 DIAGNOSIS — N30.90 BLADDER INFECTION: ICD-10-CM

## 2017-02-17 PROCEDURE — 99024 POSTOP FOLLOW-UP VISIT: CPT | Mod: S$GLB,,, | Performed by: UROLOGY

## 2017-02-17 PROCEDURE — 99999 PR PBB SHADOW E&M-EST. PATIENT-LVL III: CPT | Mod: PBBFAC,,, | Performed by: UROLOGY

## 2017-02-17 RX ORDER — NITROFURANTOIN 25; 75 MG/1; MG/1
100 CAPSULE ORAL 2 TIMES DAILY
Qty: 14 CAPSULE | Refills: 3 | Status: SHIPPED | OUTPATIENT
Start: 2017-02-17 | End: 2017-02-24

## 2017-02-17 NOTE — PROGRESS NOTES
CHIEF COMPLAINT:    Mrs. Bonilla is a 46 y.o. female presenting for a post op after cystoscopy, hydrodistension on 1/31/2017.    PRESENTING ILLNESS:    Jennifer Bonilla is a 46 y.o. female who has a history of IC, which is treated well with periodic cystoscopy with hydrodistension.  She returns after the above procedure.  She states she is feeling much better.  The lower back pain has resolved and the urinary urgency dysuria symptoms have resolved.  She was found to have a 600 ml anesthetic bladder capacity.  No Hunner's ulcers were seen.  She does not feel like she needs any medications.   She requested to have macrobid on hand as she deteriorates very quickly when she had a UTI.  Review of previous cultures reveals that they have always been sensitive to Macrobid (most are pan sensitive)    Allergies:  Codeine and Capsicum    Medications:  Outpatient Encounter Prescriptions as of 2/17/2017   Medication Sig Dispense Refill    verapamil (VERELAN) 120 MG C24P TAKE ONE CAPSULE BY MOUTH IN THE EVENING 30 capsule 0    nitrofurantoin, macrocrystal-monohydrate, (MACROBID) 100 MG capsule Take 1 capsule (100 mg total) by mouth 2 (two) times daily. 14 capsule 3    ondansetron (ZOFRAN-ODT) 4 MG TbDL Take 1 tablet (4 mg total) by mouth every 12 (twelve) hours as needed (nausea). 30 tablet 6    [DISCONTINUED] scopolamine (TRANSDERM-SCOP) 1.5 mg (1 mg over 3 days) Place 1 patch (1.5 mg total) onto the skin every 72 hours. 7 patch 0     No facility-administered encounter medications on file as of 2/17/2017.          PHYSICAL EXAMINATION:    The patient generally appears in good health, is appropriately interactive, and is in no apparent distress.    Skin: No lesions.    Mental: Cooperative with normal affect.    Neuro: Grossly intact.    HEENT: Normal. No evidence of lymphadenopathy.    Chest:  normal inspiratory effort.    Extremities: No clubbing, cyanosis, or edema      LABS:    UA 1.020, pH 5, tr protein, otherwise,  negative    IMPRESSION:    Encounter Diagnoses   Name Primary?    Interstitial cystitis Yes    Bladder infection     Post-operative state        PLAN:    1.  Refilled Macrobid with 3 refills  2.  Follow up as needed (her last cystoscopy with hydrodistension was 5 years ago)

## 2017-02-17 NOTE — MR AVS SNAPSHOT
[facility] - Urology 4th Floor  [address]  [facility] [address]  Phone: [phone]                  Jennifer Bonilla   2017 1:20 PM   Office Visit    Description:  Female : 1970   Provider:  Enid Lester MD   Department:  [facility] - Urology 4th Floor           Reason for Visit     Post-op Evaluation           Diagnoses this Visit        Comments    Bladder infection    -  Primary     Interstitial cystitis                To Do List           Future Appointments        Provider Department Dept Phone    3/7/2017 4:00 PM Dixon Dial III, MD [facility] Neurology [phone]      Goals (5 Years of Data)     None       These Medications        Disp Refills Start End    nitrofurantoin, macrocrystal-monohydrate, (MACROBID) 100 MG capsule 14 capsule 3 2017    Take 1 capsule (100 mg total) by mouth 2 (two) times daily. - Oral    Pharmacy: [facility] [address] [place] [address] [id number] Ph #: [phone]         [facility] On Call     [facility] On Call Nurse Care Line -  Assistance  Registered nurses in the [facility] On Call Center provide clinical advisement, health education, appointment booking, and other advisory services.  Call for this free service at [phone].             Medications           Message regarding Medications     Verify the changes and/or additions to your medication regime listed below are the same as discussed with your clinician today.  If any of these changes or additions are incorrect, please notify your healthcare provider.        START taking these NEW medications        Refills    nitrofurantoin, macrocrystal-monohydrate, (MACROBID) 100 MG capsule 3    Sig: Take 1 capsule (100 mg total) by mouth 2 (two) times daily.    Class: Normal    Route: Oral      STOP taking these medications     scopolamine (TRANSDERM-SCOP) 1.5 mg (1 mg over 3 days) Place 1 patch (1.5 mg total) onto the skin every 72 hours.           Verify that the below list  "of medications is an accurate representation of the medications you are currently taking.  If none reported, the list may be blank. If incorrect, please contact your healthcare provider. Carry this list with you in case of emergency.           Current Medications     verapamil (VERELAN) 120 MG C24P TAKE ONE CAPSULE BY MOUTH IN THE EVENING    nitrofurantoin, macrocrystal-monohydrate, (MACROBID) 100 MG capsule Take 1 capsule (100 mg total) by mouth 2 (two) times daily.    ondansetron (ZOFRAN-ODT) 4 MG TbDL Take 1 tablet (4 mg total) by mouth every 12 (twelve) hours as needed (nausea).           Clinical Reference Information           Your Vitals Were     BP Pulse Height Weight BMI    142/83 72 5' 5" (1.651 m) 92.9 kg (204 lb 12.9 oz) 34.08 kg/m2      Blood Pressure          Most Recent Value    BP  (!)  142/83      Allergies as of 2/17/2017     Codeine    Capsicum      Immunizations Administered on Date of Encounter - 2/17/2017     None      Language Assistance Services     ATTENTION: Language assistance services are available, free of charge. Please call [phone].      ATENCIÓN: Si habla español, tiene a matthews disposición servicios gratuitos de asistencia lingüística. Llame al [phone].     ENEDINA Ý: N?u b?n nói Ti?ng Vi?t, có các d?ch v? h? tr? ngôn ng? mi?n phí dành cho b?n. G?i s? [phone].         [facility] 4th Floor complies with applicable Federal civil rights laws and does not discriminate on the basis of race, color, national origin, age, disability, or sex.        "

## 2017-03-07 ENCOUNTER — OFFICE VISIT (OUTPATIENT)
Dept: NEUROLOGY | Facility: CLINIC | Age: 47
End: 2017-03-07
Payer: COMMERCIAL

## 2017-03-07 VITALS
SYSTOLIC BLOOD PRESSURE: 120 MMHG | WEIGHT: 205.5 LBS | DIASTOLIC BLOOD PRESSURE: 92 MMHG | HEART RATE: 60 BPM | BODY MASS INDEX: 34.24 KG/M2 | HEIGHT: 65 IN

## 2017-03-07 DIAGNOSIS — M26.609 TMJ (TEMPOROMANDIBULAR JOINT SYNDROME): ICD-10-CM

## 2017-03-07 DIAGNOSIS — G44.229 CHRONIC TENSION-TYPE HEADACHE, NOT INTRACTABLE: ICD-10-CM

## 2017-03-07 DIAGNOSIS — G43.709 CHRONIC MIGRAINE WITHOUT AURA WITHOUT STATUS MIGRAINOSUS, NOT INTRACTABLE: Primary | ICD-10-CM

## 2017-03-07 PROCEDURE — 99214 OFFICE O/P EST MOD 30 MIN: CPT | Mod: S$GLB,,, | Performed by: PSYCHIATRY & NEUROLOGY

## 2017-03-07 PROCEDURE — 99999 PR PBB SHADOW E&M-EST. PATIENT-LVL III: CPT | Mod: PBBFAC,,, | Performed by: PSYCHIATRY & NEUROLOGY

## 2017-03-07 RX ORDER — KETOPROFEN 75 MG/1
75 CAPSULE ORAL 2 TIMES DAILY PRN
Qty: 20 CAPSULE | Refills: 3 | Status: SHIPPED | OUTPATIENT
Start: 2017-03-07 | End: 2017-07-10

## 2017-03-07 RX ORDER — VERAPAMIL HYDROCHLORIDE 180 MG/1
180 CAPSULE, EXTENDED RELEASE ORAL NIGHTLY
Qty: 30 CAPSULE | Refills: 3 | Status: SHIPPED | OUTPATIENT
Start: 2017-03-07 | End: 2017-08-06 | Stop reason: SDUPTHER

## 2017-03-07 NOTE — PROGRESS NOTES
Subjective:       Patient ID: Jennifer Bonilla is a 46 y.o. female.    Reason for Consult: Headache      Interval History:  Jennifer Bonilla is here for follow up. Their condition has clinically worsened.  She has now missed her menstrual cycle for 3 months.  She is concerned she is going to pre-menopause.  She notes that in conjunction with hormonal fluctuation she has had worsening headaches for the last month.  She has had a daily headache to a mild degree thus far.  With today's visit she is hypertensive.  Previously we have tried to treat her with verapamil 180 mg daily however she became too hypotensive on that dose medication.     Objective:     Vitals:    03/07/17 1545   BP: (!) 120/92   Pulse: 60     Patient is awake alert oriented to person place and time.  Moves all 4 extremities against gravity.  Gait and station within normal limits.  Cranial nerves II through XII without focal deficit.  Focused examination was undertaken today. Over 50% of face to face time of 25 minute visit time was in giving guidance, counseling and discussing treatment options.    Assessment:     1. Chronic migraine without aura without status migrainosus, not intractable  verapamil (VERELAN) 180 MG C24P    ketoprofen (ORUDIS) 75 MG capsule   2. TMJ (temporomandibular joint syndrome)  ketoprofen (ORUDIS) 75 MG capsule   3. Chronic tension-type headache, not intractable  verapamil (VERELAN) 180 MG C24P    ketoprofen (ORUDIS) 75 MG capsule       Plan:   46-year-old female presents for follow-up of migraines.  At this point in time I will change her rescue medication to ketoprofen.  This may have some anti-inflammatory effect that may help with her TMJ issues.  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  For now I will increase the dose of her verapamil to 180 mg daily if this is not work or if this is too potent  for her than I would consider dropping her back down to 120 mg dose and having her start zonisamide as she was unable to tolerate Topamax previously due to numbness and paresthesias in her hands and dropping surgical implements.  We have discussed the risks benefits and alternatives to the treatment plan as outlined above.  I will follow up with them in 3 month(s).  The patient verbalizes understanding and agreement with the treatment plan. Questions were sought and answered to her stated verbal satisfaction.        Amirah Dial MD    This note is dictated on Dragon Natural Speaking word recognition program. There are word recognition mistakes that are occasionally missed on review.

## 2017-03-20 ENCOUNTER — PATIENT MESSAGE (OUTPATIENT)
Dept: FAMILY MEDICINE | Facility: CLINIC | Age: 47
End: 2017-03-20

## 2017-03-21 ENCOUNTER — OFFICE VISIT (OUTPATIENT)
Dept: FAMILY MEDICINE | Facility: CLINIC | Age: 47
End: 2017-03-21
Payer: COMMERCIAL

## 2017-03-21 VITALS
WEIGHT: 205.94 LBS | HEIGHT: 65 IN | SYSTOLIC BLOOD PRESSURE: 124 MMHG | OXYGEN SATURATION: 97 % | TEMPERATURE: 98 F | HEART RATE: 66 BPM | BODY MASS INDEX: 34.31 KG/M2 | RESPIRATION RATE: 17 BRPM | DIASTOLIC BLOOD PRESSURE: 80 MMHG

## 2017-03-21 DIAGNOSIS — G43.709 CHRONIC MIGRAINE W/O AURA W/O STATUS MIGRAINOSUS, NOT INTRACTABLE: ICD-10-CM

## 2017-03-21 DIAGNOSIS — Z00.00 PREVENTATIVE HEALTH CARE: Primary | ICD-10-CM

## 2017-03-21 PROCEDURE — 99396 PREV VISIT EST AGE 40-64: CPT | Mod: S$GLB,,, | Performed by: INTERNAL MEDICINE

## 2017-03-21 PROCEDURE — 99999 PR PBB SHADOW E&M-EST. PATIENT-LVL III: CPT | Mod: PBBFAC,,, | Performed by: INTERNAL MEDICINE

## 2017-03-21 NOTE — PROGRESS NOTES
"Subjective:       Patient ID: Jennifer Bonilla is a 46 y.o. female.    Chief Complaint: Annual Exam    HPI Comments: Well exam    HPI: 45 y/o w/ chronic migraine headaches here for well exam. Has recently swithced to verapamil for migraines due to adverse effects of topomax feels this is better medication. Saw her OBGYN yesterday and had mammogram and labs done (results pending).     Review of Systems   Constitutional: Negative for activity change, appetite change, fatigue, fever and unexpected weight change.   HENT: Negative for ear pain, rhinorrhea and sore throat.    Eyes: Negative for discharge and visual disturbance.   Respiratory: Negative for chest tightness, shortness of breath and wheezing.    Cardiovascular: Negative for chest pain, palpitations and leg swelling.   Gastrointestinal: Negative for abdominal pain, constipation and diarrhea.   Endocrine: Negative for cold intolerance and heat intolerance.   Genitourinary: Negative for dysuria and hematuria.   Musculoskeletal: Negative for joint swelling and neck stiffness.   Skin: Negative for rash.   Neurological: Negative for dizziness, syncope, weakness and headaches.   Psychiatric/Behavioral: Negative for suicidal ideas.       Objective:     Vitals:    03/21/17 1458   BP: 124/80   BP Location: Left arm   Patient Position: Sitting   BP Method: Manual   Pulse: 66   Resp: 17   Temp: 98.3 °F (36.8 °C)   TempSrc: Oral   SpO2: 97%   Weight: 93.4 kg (205 lb 14.6 oz)   Height: 5' 5" (1.651 m)          Physical Exam   Constitutional: She is oriented to person, place, and time. She appears well-developed and well-nourished.   HENT:   Head: Normocephalic and atraumatic.   Eyes: Conjunctivae are normal. Pupils are equal, round, and reactive to light.   Neck: Normal range of motion.   Cardiovascular: Normal rate and regular rhythm.  Exam reveals no gallop and no friction rub.    No murmur heard.  Pulmonary/Chest: Effort normal and breath sounds normal. She has no " wheezes. She has no rales.   Abdominal: Soft. Bowel sounds are normal. There is no tenderness. There is no rebound and no guarding.   Musculoskeletal: Normal range of motion. She exhibits no edema or tenderness.   Neurological: She is alert and oriented to person, place, and time. No cranial nerve deficit.   Skin: Skin is warm and dry.   Psychiatric: She has a normal mood and affect.       Assessment:       1. Preventative health care    2. Chronic migraine w/o aura w/o status migrainosus, not intractable        Plan:     1. Wear seatbelts at all times    Don't drink and drive    Wear bike helmet and other personal protective equipment when appropriate    2. Continue verapamil management by neuro

## 2017-04-18 ENCOUNTER — LAB VISIT (OUTPATIENT)
Dept: LAB | Facility: HOSPITAL | Age: 47
End: 2017-04-18
Attending: INTERNAL MEDICINE
Payer: COMMERCIAL

## 2017-04-18 ENCOUNTER — OFFICE VISIT (OUTPATIENT)
Dept: FAMILY MEDICINE | Facility: CLINIC | Age: 47
End: 2017-04-18
Payer: COMMERCIAL

## 2017-04-18 VITALS
HEIGHT: 65 IN | SYSTOLIC BLOOD PRESSURE: 130 MMHG | BODY MASS INDEX: 34.05 KG/M2 | HEART RATE: 76 BPM | TEMPERATURE: 98 F | RESPIRATION RATE: 17 BRPM | OXYGEN SATURATION: 97 % | DIASTOLIC BLOOD PRESSURE: 88 MMHG | WEIGHT: 204.38 LBS

## 2017-04-18 DIAGNOSIS — K52.9 GASTROENTERITIS: Primary | ICD-10-CM

## 2017-04-18 DIAGNOSIS — K52.9 GASTROENTERITIS: ICD-10-CM

## 2017-04-18 LAB
ALBUMIN SERPL BCP-MCNC: 3.9 G/DL
ALP SERPL-CCNC: 53 U/L
ALT SERPL W/O P-5'-P-CCNC: 16 U/L
ANION GAP SERPL CALC-SCNC: 7 MMOL/L
AST SERPL-CCNC: 18 U/L
BASOPHILS # BLD AUTO: 0.04 K/UL
BASOPHILS NFR BLD: 0.4 %
BILIRUB SERPL-MCNC: 0.5 MG/DL
BUN SERPL-MCNC: 12 MG/DL
CALCIUM SERPL-MCNC: 9.4 MG/DL
CHLORIDE SERPL-SCNC: 104 MMOL/L
CO2 SERPL-SCNC: 27 MMOL/L
CREAT SERPL-MCNC: 0.9 MG/DL
DIFFERENTIAL METHOD: NORMAL
EOSINOPHIL # BLD AUTO: 0.4 K/UL
EOSINOPHIL NFR BLD: 3.7 %
ERYTHROCYTE [DISTWIDTH] IN BLOOD BY AUTOMATED COUNT: 13.2 %
EST. GFR  (AFRICAN AMERICAN): >60 ML/MIN/1.73 M^2
EST. GFR  (NON AFRICAN AMERICAN): >60 ML/MIN/1.73 M^2
GLUCOSE SERPL-MCNC: 67 MG/DL
HCT VFR BLD AUTO: 42.2 %
HGB BLD-MCNC: 14 G/DL
LIPASE SERPL-CCNC: 29 U/L
LYMPHOCYTES # BLD AUTO: 2.6 K/UL
LYMPHOCYTES NFR BLD: 25.4 %
MCH RBC QN AUTO: 29.5 PG
MCHC RBC AUTO-ENTMCNC: 33.2 %
MCV RBC AUTO: 89 FL
MONOCYTES # BLD AUTO: 0.8 K/UL
MONOCYTES NFR BLD: 7.9 %
NEUTROPHILS # BLD AUTO: 6.3 K/UL
NEUTROPHILS NFR BLD: 62.2 %
PLATELET # BLD AUTO: 294 K/UL
PMV BLD AUTO: 12.3 FL
POTASSIUM SERPL-SCNC: 4.9 MMOL/L
PROT SERPL-MCNC: 7 G/DL
RBC # BLD AUTO: 4.74 M/UL
SODIUM SERPL-SCNC: 138 MMOL/L
WBC # BLD AUTO: 10.14 K/UL

## 2017-04-18 PROCEDURE — 80053 COMPREHEN METABOLIC PANEL: CPT

## 2017-04-18 PROCEDURE — 85025 COMPLETE CBC W/AUTO DIFF WBC: CPT

## 2017-04-18 PROCEDURE — 36415 COLL VENOUS BLD VENIPUNCTURE: CPT

## 2017-04-18 PROCEDURE — 83690 ASSAY OF LIPASE: CPT

## 2017-04-18 PROCEDURE — 99214 OFFICE O/P EST MOD 30 MIN: CPT | Mod: S$GLB,,, | Performed by: INTERNAL MEDICINE

## 2017-04-18 PROCEDURE — 99999 PR PBB SHADOW E&M-EST. PATIENT-LVL III: CPT | Mod: PBBFAC,,, | Performed by: INTERNAL MEDICINE

## 2017-04-18 RX ORDER — OMEPRAZOLE 40 MG/1
40 CAPSULE, DELAYED RELEASE ORAL EVERY MORNING
Qty: 30 CAPSULE | Refills: 0 | Status: SHIPPED | OUTPATIENT
Start: 2017-04-18 | End: 2017-07-10

## 2017-04-18 NOTE — PROGRESS NOTES
"Subjective:       Patient ID: Jennifer Bonilla is a 46 y.o. female.    Chief Complaint: Abdominal Pain and Nausea    HPI Comments: stomache pain/nausea    HPI: 47 y/o with migraine MARSHALL presents for follow up of GI complaints. One week ago developed loose watery stool with associated epigastric pain. Seen in urgent care six days ago prescribed phenergan and bland diet. Has had improvement in diarrhea. Last BM yesterday formed no melena or BRBPR no EtOH no NSAIDs, she does stillr eport some epigastric discomfort after eating general fatigue. No vomitting. Has not been taking phenergan or zofran (prescribed for migraines) no headache no vision changes.     PSurgHx; cholecystectomy, appendectomy     Review of Systems   Constitutional: Negative for activity change, appetite change, fatigue, fever and unexpected weight change.   HENT: Negative for ear pain, rhinorrhea and sore throat.    Eyes: Negative for discharge and visual disturbance.   Respiratory: Negative for chest tightness, shortness of breath and wheezing.    Cardiovascular: Negative for chest pain, palpitations and leg swelling.   Gastrointestinal: Positive for abdominal pain, diarrhea (resolved) and nausea. Negative for constipation and vomiting.   Endocrine: Negative for cold intolerance and heat intolerance.   Genitourinary: Negative for dysuria and hematuria.   Musculoskeletal: Negative for joint swelling and neck stiffness.   Skin: Negative for rash.   Neurological: Negative for dizziness, syncope, weakness and headaches.   Psychiatric/Behavioral: Negative for suicidal ideas.       Objective:     Vitals:    04/18/17 1023   BP: 130/88   BP Location: Left arm   Patient Position: Sitting   BP Method: Manual   Pulse: 76   Resp: 17   Temp: 98.4 °F (36.9 °C)   TempSrc: Oral   SpO2: 97%   Weight: 92.7 kg (204 lb 5.9 oz)   Height: 5' 5" (1.651 m)          Physical Exam   Constitutional: She is oriented to person, place, and time. She appears well-developed and " well-nourished.   HENT:   Head: Normocephalic and atraumatic.   Eyes: Conjunctivae are normal. Pupils are equal, round, and reactive to light. No scleral icterus.   Neck: Normal range of motion.   Cardiovascular: Normal rate and regular rhythm.  Exam reveals no gallop and no friction rub.    No murmur heard.  Pulmonary/Chest: Effort normal and breath sounds normal. She has no wheezes. She has no rales.   Abdominal: Soft. Bowel sounds are normal. There is no tenderness. There is no rebound and no guarding.   No CVA tenderness    Musculoskeletal: Normal range of motion. She exhibits no edema or tenderness.   Neurological: She is alert and oriented to person, place, and time. No cranial nerve deficit.   Skin: Skin is warm and dry.   Psychiatric: She has a normal mood and affect.       Assessment:       1. Gastroenteritis        Plan:    1. Improving check lipase for pancreatitis LFT's     Called patient with resluts normal lipase suspect some component of dehydration, encourage oral hydration. Consider abdominal migraine if no improvement

## 2017-05-31 ENCOUNTER — TELEPHONE (OUTPATIENT)
Dept: NEUROLOGY | Facility: CLINIC | Age: 47
End: 2017-05-31

## 2017-05-31 NOTE — TELEPHONE ENCOUNTER
Dr. Dial will not be in clinic when Ms. Bonilla is scheduled for her next visit. Left vmail asking for call to reschedule.

## 2017-06-06 NOTE — TELEPHONE ENCOUNTER
Left vmail asking for patient to call and reschedule. I advised also that I am cancelling the appointment on 6/8

## 2017-07-01 DIAGNOSIS — G44.229 CHRONIC TENSION-TYPE HEADACHE, NOT INTRACTABLE: ICD-10-CM

## 2017-07-01 DIAGNOSIS — G43.709 CHRONIC MIGRAINE WITHOUT AURA WITHOUT STATUS MIGRAINOSUS, NOT INTRACTABLE: ICD-10-CM

## 2017-07-03 RX ORDER — ONDANSETRON 4 MG/1
TABLET, ORALLY DISINTEGRATING ORAL
Qty: 30 TABLET | Refills: 3 | Status: SHIPPED | OUTPATIENT
Start: 2017-07-03 | End: 2017-07-10

## 2017-07-10 ENCOUNTER — OFFICE VISIT (OUTPATIENT)
Dept: OPTOMETRY | Facility: CLINIC | Age: 47
End: 2017-07-10
Payer: COMMERCIAL

## 2017-07-10 DIAGNOSIS — B30.9 VIRAL CONJUNCTIVITIS: Primary | ICD-10-CM

## 2017-07-10 PROCEDURE — 92012 INTRM OPH EXAM EST PATIENT: CPT | Mod: S$GLB,,, | Performed by: OPTOMETRIST

## 2017-07-10 PROCEDURE — 99999 PR PBB SHADOW E&M-EST. PATIENT-LVL II: CPT | Mod: PBBFAC,,, | Performed by: OPTOMETRIST

## 2017-07-10 NOTE — PROGRESS NOTES
Subjective:       Patient ID: Jennifer Bonilla is a 46 y.o. female      Chief Complaint   Patient presents with    Eye Problem     History of Present Illness  Dls: 6/16/16 Dr. Baker     Pt states x 1 week tearing od sore od fbs od pain 1-2 od itching od   burning od photophobia od  no changes in vision. Pt states x 2 days   tearing os. Pt did not use any gtts. Pt wears scls  But has not worn cls   in 3 weeks.         Assessment/Plan:     1. Viral conjunctivitis  +follicles/+injection. Discussed with pt. Usually takes about 2 weeks to resolve. Viral infection so abx will not help. Cool compresses as needed. Artificial tears PRN for discomfort. Practice good hygiene. Return if symptoms worsen or fail to improve.       Return if symptoms worsen or fail to improve.

## 2017-08-06 DIAGNOSIS — G43.709 CHRONIC MIGRAINE WITHOUT AURA WITHOUT STATUS MIGRAINOSUS, NOT INTRACTABLE: ICD-10-CM

## 2017-08-06 DIAGNOSIS — G44.229 CHRONIC TENSION-TYPE HEADACHE, NOT INTRACTABLE: ICD-10-CM

## 2017-08-07 RX ORDER — VERAPAMIL HYDROCHLORIDE 180 MG/1
CAPSULE, EXTENDED RELEASE ORAL
Qty: 30 CAPSULE | Refills: 0 | Status: SHIPPED | OUTPATIENT
Start: 2017-08-07 | End: 2017-08-31 | Stop reason: SDUPTHER

## 2017-08-07 NOTE — TELEPHONE ENCOUNTER
----- Message from David Gonzales sent at 8/7/2017  2:31 PM CDT -----  Contact: Self @ [phone]  Pt is calling for refill verapamil (VERELAN) 180 MG C24P.    [id number] [place] - [id number]  [id number]  [place] [address]  Phone: [phone] Fax: [phone]

## 2017-08-07 NOTE — TELEPHONE ENCOUNTER
Last visit 3/7    verapamil (VERELAN) 180 MG C24P 30 capsule 0 8/7/2017     Sig: TAKE ONE CAPSULE BY MOUTH ONCE DAILY IN THE EVENING    E-Prescribing Status: Receipt confirmed by pharmacy (8/7/2017  8:02 AM CDT)

## 2017-08-08 ENCOUNTER — OFFICE VISIT (OUTPATIENT)
Dept: NEUROLOGY | Facility: CLINIC | Age: 47
End: 2017-08-08
Payer: COMMERCIAL

## 2017-08-08 VITALS
BODY MASS INDEX: 34.93 KG/M2 | SYSTOLIC BLOOD PRESSURE: 110 MMHG | DIASTOLIC BLOOD PRESSURE: 80 MMHG | HEART RATE: 80 BPM | WEIGHT: 209.69 LBS | HEIGHT: 65 IN

## 2017-08-08 DIAGNOSIS — G43.709 CHRONIC MIGRAINE W/O AURA W/O STATUS MIGRAINOSUS, NOT INTRACTABLE: ICD-10-CM

## 2017-08-08 PROCEDURE — 99999 PR PBB SHADOW E&M-EST. PATIENT-LVL III: CPT | Mod: PBBFAC,,, | Performed by: PSYCHIATRY & NEUROLOGY

## 2017-08-08 PROCEDURE — 3079F DIAST BP 80-89 MM HG: CPT | Mod: S$GLB,,, | Performed by: PSYCHIATRY & NEUROLOGY

## 2017-08-08 PROCEDURE — 3008F BODY MASS INDEX DOCD: CPT | Mod: S$GLB,,, | Performed by: PSYCHIATRY & NEUROLOGY

## 2017-08-08 PROCEDURE — 99214 OFFICE O/P EST MOD 30 MIN: CPT | Mod: S$GLB,,, | Performed by: PSYCHIATRY & NEUROLOGY

## 2017-08-08 PROCEDURE — 3074F SYST BP LT 130 MM HG: CPT | Mod: S$GLB,,, | Performed by: PSYCHIATRY & NEUROLOGY

## 2017-08-08 NOTE — PROGRESS NOTES
Subjective:       Patient ID: Jennifer Bonilla is a 46 y.o. female.    Reason for Consult: Headache      Interval History:  Jennifer Bonilla is here for follow up. Their condition has improved on a higher dose of verapamil.  She does have a question of whether or not her headaches and actually gone away because she has had less hormonal fluctuation.  She notes that her periods are still irregular but she is not having as much of a cycle she previously had.     Objective:     Vitals:    08/08/17 1526   BP: 110/80   Pulse: 80     Patient is awake alert oriented to person place and time.  Moves all 4 extremities against gravity.  Gait and station within normal limits.  Cranial nerves II through XII without focal deficit.  Focused examination was undertaken today. Over 50% of face to face time of 25 minute visit time was in giving guidance, counseling and discussing treatment options.    Assessment/Plan:     Problem List Items Addressed This Visit        Neuro    Chronic migraine w/o aura w/o status migrainosus, not intractable    Overview     Facial flushing with Verapamil 180mg but headaches have improved  Consider decreasing Verapamil to 120mg to see if continuing menopause is contributing to headache         Current Assessment & Plan     Monitor and consider weaning Verapamil as hormnal levels           Other Visit Diagnoses    None.       46-year-old female presents for evaluation and follow-up of migraine headaches.  At this time I will ask her to decrease her dose of verapamil to 120.  If her headaches come back to normal have her continue the verapamil 180 is not causes some mild facial flushing.  We have discussed that there may be a possibility that her headaches are starting to go away as she is experiencing relatively early onset menopause.  As her hormonal fluctuation should decrease we have both discussed that this may decrease her headaches as well.  I will follow up with them in 4 month(s).  The patient  verbalizes understanding and agreement with the treatment plan. I have discussed risks, benefits and alternatives to the treatment plan. Questions were sought and answered to her stated verbal satisfaction.        Amirah Dial MD    This note is dictated on Dragon Natural Speaking word recognition program. There are word recognition mistakes that are occasionally missed on review.

## 2017-08-24 ENCOUNTER — OFFICE VISIT (OUTPATIENT)
Dept: FAMILY MEDICINE | Facility: CLINIC | Age: 47
End: 2017-08-24
Payer: COMMERCIAL

## 2017-08-24 VITALS
SYSTOLIC BLOOD PRESSURE: 130 MMHG | BODY MASS INDEX: 34.49 KG/M2 | TEMPERATURE: 98 F | HEART RATE: 75 BPM | RESPIRATION RATE: 20 BRPM | WEIGHT: 207 LBS | DIASTOLIC BLOOD PRESSURE: 74 MMHG | HEIGHT: 65 IN | OXYGEN SATURATION: 98 %

## 2017-08-24 DIAGNOSIS — R07.89 COSTOCHONDRAL CHEST PAIN: Primary | ICD-10-CM

## 2017-08-24 PROCEDURE — 99999 PR PBB SHADOW E&M-EST. PATIENT-LVL III: CPT | Mod: PBBFAC,,, | Performed by: FAMILY MEDICINE

## 2017-08-24 PROCEDURE — 99213 OFFICE O/P EST LOW 20 MIN: CPT | Mod: S$GLB,,, | Performed by: FAMILY MEDICINE

## 2017-08-24 PROCEDURE — 3078F DIAST BP <80 MM HG: CPT | Mod: S$GLB,,, | Performed by: FAMILY MEDICINE

## 2017-08-24 PROCEDURE — 3075F SYST BP GE 130 - 139MM HG: CPT | Mod: S$GLB,,, | Performed by: FAMILY MEDICINE

## 2017-08-24 PROCEDURE — 3008F BODY MASS INDEX DOCD: CPT | Mod: S$GLB,,, | Performed by: FAMILY MEDICINE

## 2017-08-24 RX ORDER — IBUPROFEN 800 MG/1
800 TABLET ORAL EVERY 8 HOURS PRN
Qty: 30 TABLET | Refills: 0 | Status: SHIPPED | OUTPATIENT
Start: 2017-08-24 | End: 2018-04-30

## 2017-08-24 NOTE — PROGRESS NOTES
"Routine Office Visit    Patient Name: Jennifer Bonilla    : 1970  MRN: [id number]    Subjective:  Jennifer is a 46 y.o. female who presents today for:    1. R thoracic "soreness" -   For a day or two.  Just above the bra line.  Gets worse with deep breaths, pushing things.  And that's what she was doing yesterday - pushing a heavy patient away.  At night, when she lays on her side it gets worse.  No tingling or numbness.  She's gotten a dose pack in the past for it.  Most times she takes motrin, because she's sensitive to medications and has seen bad medication interactions and side effects.   She slept with a heating pad last night for 15 minutes.  That helps too.  She denies heartburn. She denies palpitations.       She sees neurologist for headaches.     Past Medical History  Past Medical History:   Diagnosis Date    Acid reflux     Arrhythmia     Cystitis, interstitial     TMJ (temporomandibular joint disorder)     jaw clicking       Past Surgical History  Past Surgical History:   Procedure Laterality Date    ABDOMINAL SURGERY      APPENDECTOMY      CHOLECYSTECTOMY      CYSTOSCOPY      RADIOFREQUENCY ABLATION         Family History  Family History   Problem Relation Age of Onset    Cancer Father     Cancer Mother     No Known Problems Sister     No Known Problems Brother     No Known Problems Maternal Aunt     No Known Problems Maternal Uncle     No Known Problems Paternal Aunt     No Known Problems Paternal Uncle     No Known Problems Maternal Grandmother     No Known Problems Maternal Grandfather     No Known Problems Paternal Grandmother     No Known Problems Paternal Grandfather     Glaucoma Neg Hx     Amblyopia Neg Hx     Blindness Neg Hx     Cataracts Neg Hx     Diabetes Neg Hx     Hypertension Neg Hx     Macular degeneration Neg Hx     Retinal detachment Neg Hx     Strabismus Neg Hx     Stroke Neg Hx     Thyroid disease Neg Hx        Social History  Social History " "    Social History    Marital status:      Spouse name: N/A    Number of children: N/A    Years of education: N/A     Occupational History     [employer]     Social History Main Topics    Smoking status: Never Smoker    Smokeless tobacco: Never Used    Alcohol use 1.5 oz/week     3 Standard drinks or equivalent per week      Comment: socially    Drug use: No    Sexual activity: Yes     Partners: Male     Other Topics Concern    Not on file     Social History Narrative    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        Current Medications  Current Outpatient Prescriptions on File Prior to Visit   Medication Sig Dispense Refill    ondansetron (ZOFRAN-ODT) 4 MG TbDL Take 1 tablet (4 mg total) by mouth every 12 (twelve) hours as needed (nausea). 30 tablet 6    verapamil (VERELAN) 180 MG C24P TAKE ONE CAPSULE BY MOUTH ONCE DAILY IN THE EVENING 30 capsule 0     No current facility-administered medications on file prior to visit.        Allergies   Review of patient's allergies indicates:   Allergen Reactions    Codeine Anaphylaxis    Capsicum Other (See Comments)     others       Review of Systems (Pertinent positives)  Constititutional: Weight loss, excess fatigue, chills, fever, night sweats, weakness, loss of appetite  Ears: Earache, ringing in ears, discharge, hearing loss, hearing aid, popping, infection Nose: stuffy nose, mouth breathing, post-nasal drip,   Lungs: Cough, sputum, wheeze, frequent URI, SOA, Asthma  Heart: Chest pain, angina, palpitations, extra heart beats  Stomach/Intestine: Heartburn, Nausea, vomiting, diarrhea, indigestion, bloating, constipation  Bones/Muscles/Joints: Joint pain, deformities, back pain, swelling, stiffness  Brain: Numbness, tingling, tremor, fainting, headaches, muscle weakness    /74 (BP Location: Right arm, Patient Position: Sitting, BP Method: Large (Manual))   Pulse 75   Temp 98.1 °F (36.7 °C) (Oral)   Resp 20   Ht 5' 5" (1.651 m)   Wt 93.9 kg " (207 lb)   SpO2 98%   BMI 34.45 kg/m²     GENERAL APPEARANCE: in no apparent distress and well developed and well nourished  RESPIRATORY: appears well, vitals normal, no respiratory distress, acyanotic, normal RR, chest clear, no wheezing, crepitations, rhonchi, normal symmetric air entry  HEART: regular rate and rhythm, S1, S2 normal, no murmur, click, rub or gallop.    NEUROLOGIC: normal without focal findings, CN II-XII are intact.   Extremities: warm/well perfused.  No abnormal hair patterns.  No clubbing, cyanosis or edema.  +sternal ttp over R lower ribs.  No erythema or masses noted.  SKIN: no rashes, no wounds, no other lesions  PSYCH: Alert, oriented x 3, thought content appropriate, speech normal, pleasant and cooperative, good eye contact, well groomed, recall good, concentration/judgement good and apparently average intelligence.    Assessment/Plan:  Jennifer Bonilla is a 46 y.o. female who presents today for :    Jennifer was seen today for chest pain.    Diagnoses and all orders for this visit:    Costochondral chest pain  -     ibuprofen (ADVIL,MOTRIN) 800 MG tablet; Take 1 tablet (800 mg total) by mouth every 8 (eight) hours as needed for Pain.  -    Discussed stretching strategies, yoga to help muscles/cartilage.  Declines muscle relaxers, gabapentin due to potential side effects.  -    F/u if persists.  Consider PT.  Continue heat therapy.

## 2017-08-31 DIAGNOSIS — G44.229 CHRONIC TENSION-TYPE HEADACHE, NOT INTRACTABLE: ICD-10-CM

## 2017-08-31 DIAGNOSIS — G43.709 CHRONIC MIGRAINE WITHOUT AURA WITHOUT STATUS MIGRAINOSUS, NOT INTRACTABLE: ICD-10-CM

## 2017-08-31 RX ORDER — VERAPAMIL HYDROCHLORIDE 180 MG/1
CAPSULE, EXTENDED RELEASE ORAL
Qty: 30 CAPSULE | Refills: 6 | Status: SHIPPED | OUTPATIENT
Start: 2017-08-31 | End: 2017-12-11 | Stop reason: SDUPTHER

## 2017-08-31 NOTE — TELEPHONE ENCOUNTER
----- Message from Radha Quintanilla sent at 8/31/2017  3:00 PM CDT -----  Contact: pt  x_ 1st Request  _ 2nd Request  _ 3rd Request    Who: pt    Why: is requesting refill on her Rx. Pt is needing it called in to her pharmacy today    What Number to Call Back: [phone]    When to Expect a call back: (Before the end of the day)  -- if call after 3:00 call back will be tomorrow.

## 2017-12-11 ENCOUNTER — OFFICE VISIT (OUTPATIENT)
Dept: NEUROLOGY | Facility: CLINIC | Age: 47
End: 2017-12-11
Payer: COMMERCIAL

## 2017-12-11 VITALS
HEART RATE: 80 BPM | HEIGHT: 65 IN | SYSTOLIC BLOOD PRESSURE: 126 MMHG | BODY MASS INDEX: 34.82 KG/M2 | WEIGHT: 209 LBS | DIASTOLIC BLOOD PRESSURE: 88 MMHG

## 2017-12-11 DIAGNOSIS — G43.709 CHRONIC MIGRAINE WITHOUT AURA WITHOUT STATUS MIGRAINOSUS, NOT INTRACTABLE: ICD-10-CM

## 2017-12-11 DIAGNOSIS — G44.229 CHRONIC TENSION-TYPE HEADACHE, NOT INTRACTABLE: ICD-10-CM

## 2017-12-11 DIAGNOSIS — G43.709 CHRONIC MIGRAINE W/O AURA W/O STATUS MIGRAINOSUS, NOT INTRACTABLE: Primary | ICD-10-CM

## 2017-12-11 PROCEDURE — 99214 OFFICE O/P EST MOD 30 MIN: CPT | Mod: S$GLB,,, | Performed by: PSYCHIATRY & NEUROLOGY

## 2017-12-11 PROCEDURE — 99999 PR PBB SHADOW E&M-EST. PATIENT-LVL III: CPT | Mod: PBBFAC,,, | Performed by: PSYCHIATRY & NEUROLOGY

## 2017-12-11 RX ORDER — VERAPAMIL HYDROCHLORIDE 120 MG/1
120 CAPSULE, EXTENDED RELEASE ORAL DAILY
Qty: 30 CAPSULE | Refills: 0 | Status: SHIPPED | OUTPATIENT
Start: 2017-12-11 | End: 2018-01-09 | Stop reason: SDUPTHER

## 2017-12-11 NOTE — PROGRESS NOTES
Subjective:       Patient ID: Jennifer Bonilla is a 47 y.o. female.    Reason for Consult: Headache      Interval History:  Jennifer Bonilla is here for follow up. Their condition is clinically stable.  She notes that she is no longer headaches.  She does note that she is dealing with some problems with tingling her feet which is worse at night as well as weight gain despite exercise.  We have discussed her labs it appears that she has not gone for her yearly checkup of her A1c normal.  Gone for her check her thyroid.  I have looked at the pharmacopeia verapamil as these are common side effects, paresthesia weight gain on this medication.  She notes that she is still having facial would like to try dose of this medication.  She notes that she no longer has a menstrual cycle.     Objective:     Vitals:    12/11/17 1543   BP: 126/88   Pulse: 80     Patient is awake alert oriented person place and time.  Moves all 4 extremities and station within normal limits cranial nerves II through XII without focal deficits.  Focused examination was undertaken today. Over 50% of face to face time of 25 minute visit time was in giving guidance, counseling and discussing treatment options.      Assessment/Plan:     Problem List Items Addressed This Visit        Neuro    Chronic migraine w/o aura w/o status migrainosus, not intractable - Primary    Overview     Facial flushing with Verapamil 180mg but headaches have improved  Decreasing Verapamil to 120mg due to facial flushing and side effects  Consider stopping Verapamil after 1 month of lower dose           Other Visit Diagnoses     Chronic tension-type headache, not intractable        Relevant Medications    verapamil (VERELAN) 120 MG C24P    Chronic migraine without aura without status migrainosus, not intractable        Relevant Medications    verapamil (VERELAN) 120 MG C24P        47-year-old female presents for evaluation and follow-up of headaches.  We have had good control of  her headaches for some time now.  We have discussed weaning the verapamil.  I have told her that she continues to have symptoms of paresthesias and weight gain she may want to check her hemoglobin A1c and thyroid function with her primary care doctor.  If this laboratory studies are unrevealing that I would consider further evaluation likely with laboratory studies.  We will begin to wean her off of the verapamil.  If her symptoms go away on cessation of the verapamil.  I have told her that she may just be experiencing very rare side effects from this medication as they did not come up on initial evaluation during the pharmacopeia.  I will follow up with them in 2 month(s).  The patient verbalizes understanding and agreement with the treatment plan. I have discussed risks, benefits and alternatives to the treatment plan. Questions were sought and answered to her stated verbal satisfaction.        Amirah Dial MD    This note is dictated on Dragon Natural Speaking word recognition program. There are word recognition mistakes that are occasionally missed on review.

## 2017-12-12 ENCOUNTER — LAB VISIT (OUTPATIENT)
Dept: LAB | Facility: HOSPITAL | Age: 47
End: 2017-12-12
Attending: INTERNAL MEDICINE
Payer: COMMERCIAL

## 2017-12-12 DIAGNOSIS — Z01.419 PAP SMEAR, LOW-RISK: Primary | ICD-10-CM

## 2017-12-12 DIAGNOSIS — Z12.39 SCREENING BREAST EXAMINATION: ICD-10-CM

## 2017-12-12 DIAGNOSIS — Z12.12 SCREENING FOR MALIGNANT NEOPLASM OF THE RECTUM: ICD-10-CM

## 2017-12-12 DIAGNOSIS — Z12.4 ROUTINE CERVICAL SMEAR: ICD-10-CM

## 2017-12-12 DIAGNOSIS — G43.001 MIGRAINE WITHOUT AURA AND WITH STATUS MIGRAINOSUS, NOT INTRACTABLE: ICD-10-CM

## 2017-12-12 LAB
ALBUMIN SERPL BCP-MCNC: 3.8 G/DL
ALP SERPL-CCNC: 63 U/L
ALT SERPL W/O P-5'-P-CCNC: 16 U/L
ANION GAP SERPL CALC-SCNC: 8 MMOL/L
AST SERPL-CCNC: 15 U/L
BASOPHILS # BLD AUTO: 0.03 K/UL
BASOPHILS NFR BLD: 0.4 %
BILIRUB SERPL-MCNC: 0.7 MG/DL
BUN SERPL-MCNC: 12 MG/DL
CALCIUM SERPL-MCNC: 9.6 MG/DL
CHLORIDE SERPL-SCNC: 106 MMOL/L
CHOLEST SERPL-MCNC: 188 MG/DL
CHOLEST/HDLC SERPL: 3.3 {RATIO}
CO2 SERPL-SCNC: 27 MMOL/L
CREAT SERPL-MCNC: 0.9 MG/DL
DIFFERENTIAL METHOD: NORMAL
EOSINOPHIL # BLD AUTO: 0.4 K/UL
EOSINOPHIL NFR BLD: 4.5 %
ERYTHROCYTE [DISTWIDTH] IN BLOOD BY AUTOMATED COUNT: 13.3 %
EST. GFR  (AFRICAN AMERICAN): >60 ML/MIN/1.73 M^2
EST. GFR  (NON AFRICAN AMERICAN): >60 ML/MIN/1.73 M^2
ESTIMATED AVG GLUCOSE: 100 MG/DL
FSH SERPL-ACNC: 21.5 MIU/ML
GLUCOSE SERPL-MCNC: 90 MG/DL
HBA1C MFR BLD HPLC: 5.1 %
HCT VFR BLD AUTO: 39.5 %
HDLC SERPL-MCNC: 57 MG/DL
HDLC SERPL: 30.3 %
HGB BLD-MCNC: 13.4 G/DL
LDLC SERPL CALC-MCNC: 111.4 MG/DL
LYMPHOCYTES # BLD AUTO: 2.3 K/UL
LYMPHOCYTES NFR BLD: 30.2 %
MAGNESIUM SERPL-MCNC: 2.1 MG/DL
MCH RBC QN AUTO: 30 PG
MCHC RBC AUTO-ENTMCNC: 33.9 G/DL
MCV RBC AUTO: 88 FL
MONOCYTES # BLD AUTO: 0.8 K/UL
MONOCYTES NFR BLD: 10.1 %
NEUTROPHILS # BLD AUTO: 4.2 K/UL
NEUTROPHILS NFR BLD: 54.8 %
NONHDLC SERPL-MCNC: 131 MG/DL
PHOSPHATE SERPL-MCNC: 2.7 MG/DL
PLATELET # BLD AUTO: 241 K/UL
PMV BLD AUTO: 11.1 FL
POTASSIUM SERPL-SCNC: 4.5 MMOL/L
PROT SERPL-MCNC: 6.7 G/DL
RBC # BLD AUTO: 4.47 M/UL
SODIUM SERPL-SCNC: 141 MMOL/L
TRIGL SERPL-MCNC: 98 MG/DL
TSH SERPL DL<=0.005 MIU/L-ACNC: 2.23 UIU/ML
WBC # BLD AUTO: 7.72 K/UL

## 2017-12-12 PROCEDURE — 84100 ASSAY OF PHOSPHORUS: CPT

## 2017-12-12 PROCEDURE — 83001 ASSAY OF GONADOTROPIN (FSH): CPT

## 2017-12-12 PROCEDURE — 80053 COMPREHEN METABOLIC PANEL: CPT

## 2017-12-12 PROCEDURE — 80061 LIPID PANEL: CPT

## 2017-12-12 PROCEDURE — 36415 COLL VENOUS BLD VENIPUNCTURE: CPT

## 2017-12-12 PROCEDURE — 84443 ASSAY THYROID STIM HORMONE: CPT

## 2017-12-12 PROCEDURE — 83735 ASSAY OF MAGNESIUM: CPT

## 2017-12-12 PROCEDURE — 85025 COMPLETE CBC W/AUTO DIFF WBC: CPT

## 2017-12-12 PROCEDURE — 83036 HEMOGLOBIN GLYCOSYLATED A1C: CPT

## 2017-12-31 ENCOUNTER — HOSPITAL ENCOUNTER (EMERGENCY)
Facility: OTHER | Age: 47
Discharge: HOME | End: 2017-12-31
Attending: EMERGENCY MEDICINE
Payer: COMMERCIAL

## 2017-12-31 VITALS
DIASTOLIC BLOOD PRESSURE: 82 MMHG | TEMPERATURE: 98 F | HEIGHT: 65 IN | RESPIRATION RATE: 19 BRPM | SYSTOLIC BLOOD PRESSURE: 129 MMHG | WEIGHT: 200 LBS | OXYGEN SATURATION: 99 % | BODY MASS INDEX: 33.32 KG/M2 | HEART RATE: 65 BPM

## 2017-12-31 DIAGNOSIS — S86.912A MUSCLE STRAIN OF LEFT LOWER LEG, INITIAL ENCOUNTER: Primary | ICD-10-CM

## 2017-12-31 LAB — POC D-DI: <100 NG/ML (ref 0–450)

## 2017-12-31 PROCEDURE — 85379 FIBRIN DEGRADATION QUANT: CPT

## 2017-12-31 PROCEDURE — 99283 EMERGENCY DEPT VISIT LOW MDM: CPT

## 2017-12-31 RX ORDER — METHYLPREDNISOLONE 4 MG/1
TABLET ORAL
Qty: 1 PACKAGE | Refills: 0 | Status: SHIPPED | OUTPATIENT
Start: 2017-12-31 | End: 2018-01-21

## 2017-12-31 NOTE — ED NOTES
C/o left foot pain for several weeks that has gradually spread to her left lower and upper leg, diagnosed with plantar fascitis several weeks ago, denies injury

## 2017-12-31 NOTE — ED PROVIDER NOTES
Encounter Date: 12/31/2017       History     Chief Complaint   Patient presents with    Leg Pain     reports being treated for plantar fascitis and now has L leg pain from hip down     This is a 47-year-old female with no significant past medical history comes in complaining of pain and swelling to her left leg.  Patient reports that she has a history of plantar fasciitis as well as an uneven sized leg on that side.  She has been seeing physical therapy and is now wearing an orthotic.  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  She is now having pain that radiates from her hip down to her ankle.  Pain is mostly in the popliteal region.  She has not noted any significant swelling.  She reports that she saw her PCP and they checked basic lab work to make sure her electrolytes were normal and that she was not hypokalemic.  She reports those labs were unremarkable.  She has been taking ibuprofen with minimal improvement.  She denies any chest pain or shortness of breath.  She denies any history of prolonged immobilization.  She denies any calf pain or swelling.  No exacerbating or alleviating factors otherwise.          Review of patient's allergies indicates:   Allergen Reactions    Codeine Anaphylaxis    Capsicum Other (See Comments)     others     Past Medical History:   Diagnosis Date    Acid reflux     Arrhythmia     Cystitis, interstitial     TMJ (temporomandibular joint disorder)     jaw clicking     Past Surgical History:   Procedure Laterality Date    ABDOMINAL SURGERY      APPENDECTOMY      CHOLECYSTECTOMY      CYSTOSCOPY      RADIOFREQUENCY ABLATION       Family History   Problem Relation Age of Onset    Cancer Father     Cancer Mother     No Known Problems Sister     No Known Problems Brother     No Known Problems Maternal Aunt     No Known Problems Maternal Uncle     No Known Problems Paternal  Aunt     No Known Problems Paternal Uncle     No Known Problems Maternal Grandmother     No Known Problems Maternal Grandfather     No Known Problems Paternal Grandmother     No Known Problems Paternal Grandfather     Glaucoma Neg Hx     Amblyopia Neg Hx     Blindness Neg Hx     Cataracts Neg Hx     Diabetes Neg Hx     Hypertension Neg Hx     Macular degeneration Neg Hx     Retinal detachment Neg Hx     Strabismus Neg Hx     Stroke Neg Hx     Thyroid disease Neg Hx      Social History   Substance Use Topics    Smoking status: Never Smoker    Smokeless tobacco: Never Used    Alcohol use 1.5 oz/week     3 Standard drinks or equivalent per week      Comment: socially     Review of Systems   Constitutional: Negative for chills and fever.   HENT: Negative for rhinorrhea and sore throat.    Respiratory: Negative for chest tightness and shortness of breath.    Cardiovascular: Negative for chest pain and palpitations.   Gastrointestinal: Negative for abdominal pain and nausea.   Musculoskeletal: Positive for arthralgias. Negative for back pain.   Skin: Negative for color change and rash.   Neurological: Negative for weakness, numbness and headaches.   All other systems reviewed and are negative.      Physical Exam     Initial Vitals [12/31/17 0825]   BP Pulse Resp Temp SpO2   129/82 65 19 98.1 °F (36.7 °C) 99 %      MAP       97.67         Physical Exam    Nursing note and vitals reviewed.  Constitutional: She appears well-developed and well-nourished. No distress.   HENT:   Head: Normocephalic and atraumatic.   Nose: Nose normal.   Eyes: Conjunctivae and EOM are normal. Pupils are equal, round, and reactive to light.   Neck: Normal range of motion. Neck supple.   Cardiovascular: Normal rate and regular rhythm.   Pulmonary/Chest: Breath sounds normal. She has no wheezes.   Abdominal: Soft. There is no tenderness.   Musculoskeletal:   Tenderness to palpation over left popliteal region with no significant  swelling, tenderness to palpation over lateral hip with full range of motion, neurovascularly intact.   Lymphadenopathy:     She has no cervical adenopathy.   Neurological: She is alert. She has normal strength and normal reflexes.   Skin: Skin is warm and dry. No rash noted.   Psychiatric: She has a normal mood and affect. Her behavior is normal.         ED Course   Procedures  Labs Reviewed - No data to display          Medical Decision Making:   Initial Assessment:   This is a 47-year-old female who comes in complaining of left leg pain.  On examination vitals are stable.  On physical exam she has tenderness to palpation over her popliteal region with no calf pain or swelling.  Her exam is unremarkable otherwise.  Patient's pain appears to be musculoskeletal but in light of popliteal pain a d-dimer was ordered to rule out DVT.  She has no risk factors for DVT and has a low pretest probability.  Differential Diagnosis:   DVT, musculoskeletal pain, sciatica, knee sprain, ligamentous injury, OA, joint effusion  Clinical Tests:   Lab Tests: Ordered and Reviewed       <> Summary of Lab: D-dimer is negative.  ED Management:  Patient clinically has no evidence of DVT with a negative d-dimer.  Her symptoms appear consistent with muscular skeletal pain.  She will be discharged with a Medrol Dosepak and close outpatient follow-up.  She is to return to the ER for any concerns.                   ED Course      Clinical Impression:   The encounter diagnosis was Muscle strain of left lower leg, initial encounter.    Disposition:   Disposition: Discharged  Condition: Stable                        Lynn Hummel MD  12/31/17 0976

## 2018-01-09 DIAGNOSIS — G43.709 CHRONIC MIGRAINE WITHOUT AURA WITHOUT STATUS MIGRAINOSUS, NOT INTRACTABLE: ICD-10-CM

## 2018-01-09 DIAGNOSIS — G44.229 CHRONIC TENSION-TYPE HEADACHE, NOT INTRACTABLE: ICD-10-CM

## 2018-01-09 RX ORDER — VERAPAMIL HYDROCHLORIDE 120 MG/1
CAPSULE, EXTENDED RELEASE ORAL
Qty: 30 CAPSULE | Refills: 3 | Status: SHIPPED | OUTPATIENT
Start: 2018-01-09 | End: 2018-05-21 | Stop reason: SDUPTHER

## 2018-01-12 ENCOUNTER — TELEPHONE (OUTPATIENT)
Dept: NEUROLOGY | Facility: CLINIC | Age: 48
End: 2018-01-12

## 2018-01-12 NOTE — TELEPHONE ENCOUNTER
----- Message from Estephanie Martinez sent at 1/12/2018 10:16 AM CST -----  Contact: Self- [phone]                                 Prescription Refill Request  Name of medication and dose verapamil (VERELAN) 120 MG C24P    Pharmacy [facility] [address] [facility] [id number] Counts include 234 beds at the [facility] [phone]      Are you completely out of your medication YES    Additional Information:

## 2018-04-27 ENCOUNTER — OFFICE VISIT (OUTPATIENT)
Dept: FAMILY MEDICINE | Facility: CLINIC | Age: 48
End: 2018-04-27
Payer: COMMERCIAL

## 2018-04-27 VITALS
WEIGHT: 205.69 LBS | OXYGEN SATURATION: 97 % | HEART RATE: 90 BPM | HEIGHT: 65 IN | SYSTOLIC BLOOD PRESSURE: 140 MMHG | DIASTOLIC BLOOD PRESSURE: 80 MMHG | BODY MASS INDEX: 34.27 KG/M2 | TEMPERATURE: 99 F

## 2018-04-27 DIAGNOSIS — G89.29 CHRONIC PAIN OF LEFT KNEE: ICD-10-CM

## 2018-04-27 DIAGNOSIS — R03.0 ELEVATED BLOOD PRESSURE READING WITHOUT DIAGNOSIS OF HYPERTENSION: ICD-10-CM

## 2018-04-27 DIAGNOSIS — M25.562 CHRONIC PAIN OF LEFT KNEE: ICD-10-CM

## 2018-04-27 DIAGNOSIS — G43.709 CHRONIC MIGRAINE W/O AURA W/O STATUS MIGRAINOSUS, NOT INTRACTABLE: ICD-10-CM

## 2018-04-27 DIAGNOSIS — R00.2 PALPITATIONS: ICD-10-CM

## 2018-04-27 DIAGNOSIS — N92.6 MENSTRUAL DISORDER: ICD-10-CM

## 2018-04-27 DIAGNOSIS — R23.2 FLUSHING: Primary | ICD-10-CM

## 2018-04-27 PROCEDURE — 3077F SYST BP >= 140 MM HG: CPT | Mod: CPTII,S$GLB,, | Performed by: INTERNAL MEDICINE

## 2018-04-27 PROCEDURE — 3079F DIAST BP 80-89 MM HG: CPT | Mod: CPTII,S$GLB,, | Performed by: INTERNAL MEDICINE

## 2018-04-27 PROCEDURE — 99215 OFFICE O/P EST HI 40 MIN: CPT | Mod: S$GLB,,, | Performed by: INTERNAL MEDICINE

## 2018-04-27 PROCEDURE — 99999 PR PBB SHADOW E&M-EST. PATIENT-LVL III: CPT | Mod: PBBFAC,,, | Performed by: INTERNAL MEDICINE

## 2018-04-27 NOTE — PROGRESS NOTES
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  She is new to me.  She is followed by neurology for chronic migraines.  She's been on verapamil for years.  With the verapamil was increased to 120 mg a day in January or so she is well sustained flushing.  Her face is always red.  Her flushing does not come in episodes.  She has gained some weight.  She tried Topamax but had numbness in the hand.  Her migraines appear to have been hormone related.  She had an ablation and her headaches were gone.  She's had the return of some spotting and menses for the past 8 months and the headaches are back.  Her lab work and thyroid function about 5 months ago was all normal.  She's had some sustained weight gain me over the past year or so.    Another issue is left knee pain since December.  No injury.  No history of playing sports or injuries.  She has instability and a feeling that it's locking up when she walks.  She's had recurrent swelling.  She did go to the emergency room lately when it was more severe and had some fluid drained and an injection which helped for about 3 days.  Pain is more medial.  She did see Dr. Alvarez outside orthopedic and an x-ray last week.  She was told it was bone on bone and she might need a partial knee replacement.  Obviously she's not very interested in that.  We discussed the possibilities of there being some significant underlying meniscus injury and perhaps arthroscopy could help alleviate some of her symptoms even if it is arthritis.  She would really like to see Dr. Sandoval and has attempted to make an appointment but the appointment weight was extensive in light of her symptoms and therefore she saw an outside orthopedic.  Other than draining some fluid from the knee last week, no particular plan was discussed other than the partial knee replacement.        ROS:   CONST: weight stable.  EYES: no vision change. ENT: no sore throat. CV: no chest pain w/ exertion. RESP: no shortness of breath. GI: no nausea, vomiting, diarrhea. No dysphagia. : no urinary issues. MUSCULOSKELETAL: no new myalgias or arthralgias. SKIN: no new changes. NEURO: no focal deficits. PSYCH: no new issues. ENDOCRINE: no polyuria. HEME: no lymph nodes. ALLERGY: no general pruritis.      Past Medical History:   Diagnosis Date    Acid reflux     Chronic migraine w/o aura w/o status migrainosus, not intractable 03/21/2017    Interstitial cystitis 9/22/2014    Palpitations 4/1/2014    RBBB 05/09/2014    Pt's Ep study is not c/w WPW or Brugada syndrome.  She has RBBB with short DE interval.    TMJ (temporomandibular joint disorder)     jaw clicking     Past Surgical History:   Procedure Laterality Date    ABDOMINAL SURGERY      APPENDECTOMY      CHOLECYSTECTOMY      CYSTOSCOPY      RADIOFREQUENCY ABLATION      uterine ablation      Social History     Social History    Marital status:      Spouse name: N/A    Number of children: N/A    Years of education: N/A     Occupational History     [employer]--- surgical circulating nurse      Social History Main Topics    Smoking status: Never Smoker    Smokeless tobacco: Never Used    Alcohol use 1.5 oz/week     3 Standard drinks or equivalent per week      Comment: socially    Drug use: No    Sexual activity: Yes     Partners: Male     Other Topics Concern    Not on file     Social History Narrative    Works at surgery at [employer].      family history includes Cancer in her father and mother; No Known Problems in her brother, maternal aunt, maternal grandfather, maternal grandmother, maternal uncle, paternal aunt, paternal grandfather, paternal grandmother, paternal uncle, and sister.    Gen: no distress  EYES: conjunctiva clear, non-icteric, PERRL  ENT: nose clear, nasal mucosa normal, oropharynx clear and moist, teeth good  NECK:supple, thyroid  non-palpable  RESP: effort is good, lungs clear  CV: heart RRR w/o murmur, gallops or rubs; no carotid bruits, no edema  GI: abdomen soft, non-distended, non-tender, no hepatosplenomegaly  MS: gait normal, no clubbing or cyanosis of the digits, some swelling in the left knee.  No MCL pain.  She does have medial joint line tenderness.  Her anterior cruciate ligament and so forth are all stable.  SKIN: no rashes, warm to touch, the skin of her face is generally retina don't see signs of rosacea    Jennifer was seen today for establish care.    Diagnoses and all orders for this visit:    Flushing, sustained, possible side effects with the increased verapamil.  She is on verapamil for migraine prevention so will have her hold the medication completely for a couple of days and assess if the flushing is medication related.  We discussed possibly trying metoprolol or propranolol is a migraine preventative instead.  She does have some elevated blood pressure recording today and will monitor when she stops the verapamil.  She has no diarrhea or other symptoms to suggest serotonin excess type situations regarding to the flushing.  Flushing is not directly related to the headaches.  Consider hormone issues and she may well be perimenopausal patient has an appointment next week with her GYN.  She has had a uterine ablation but may well need a workup as would be expected for the return of leading    Chronic migraine w/o aura w/o status migrainosus, not intractable, keep a headache diary, saw her headaches are migraines with others appear to be tension.  We will target the migraines with the preventative pending the above plan    Palpitations, reviewed the prior cardiac notes    Menstrual disorder, ultimately will get her back to neurology for a migraine preventative if we have difficulty    Chronic pain of left knee, appears to potentially have arthritis although I cannot review the x-ray.  She may well need arthroscopy and not  "clear whether or not an MRI should be done ride to making that decision if arthroscopy would be planned either way.  We'll message her orthopedic of choice is pending gradient of time discussing all these various and sundry topics with the patient.  She was new to me and we went in to great detail.  We also sent communication to a potentially new orthopedic together.Total time over 45 minutes with over 50% counseling.    Elevated blood pressure reading without diagnosis of hypertension     clinical note be sensitive since she is new to me and I do not want to discrepancies in the available history to cause problems     "This note will not be shared with the patient."  "

## 2018-05-01 ENCOUNTER — OFFICE VISIT (OUTPATIENT)
Dept: OPTOMETRY | Facility: CLINIC | Age: 48
End: 2018-05-01
Payer: COMMERCIAL

## 2018-05-01 DIAGNOSIS — H52.13 MYOPIA WITH PRESBYOPIA OF BOTH EYES: Primary | ICD-10-CM

## 2018-05-01 DIAGNOSIS — Z46.0 FITTING AND ADJUSTMENT OF SPECTACLES AND CONTACT LENSES: Primary | ICD-10-CM

## 2018-05-01 DIAGNOSIS — H52.4 MYOPIA WITH PRESBYOPIA OF BOTH EYES: Primary | ICD-10-CM

## 2018-05-01 PROCEDURE — 92015 DETERMINE REFRACTIVE STATE: CPT | Mod: S$GLB,,, | Performed by: OPTOMETRIST

## 2018-05-01 PROCEDURE — 99999 PR PBB SHADOW E&M-EST. PATIENT-LVL I: CPT | Mod: PBBFAC,,, | Performed by: OPTOMETRIST

## 2018-05-01 PROCEDURE — 92310 CONTACT LENS FITTING OU: CPT | Mod: S$GLB,,, | Performed by: OPTOMETRIST

## 2018-05-01 PROCEDURE — 92014 COMPRE OPH EXAM EST PT 1/>: CPT | Mod: S$GLB,,, | Performed by: OPTOMETRIST

## 2018-05-01 NOTE — PROGRESS NOTES
HPI     DSL- 6/16/16     Pt states no Va change. Pt denies eye allergies, floaters, and flashes.   Glare is bother at night. Pt d/c the wear of C/L X 2 months ago.     Eyemeds  No gtt    Last edited by Richmond Baker, OD on 5/1/2018  3:39 PM. (History)            Assessment /Plan     For exam results, see Encounter Report.    Myopia with presbyopia of both eyes  Eyeglass Final Rx     Eyeglass Final Rx       Sphere Cylinder Axis Dist VA Add    Right -1.50 Sphere  20/20 +1.50    Left -1.00 +0.50 180 20/20 +1.50    Expiration Date:  5/2/2019              Contact Lens Final Rx     Final Contact Lens Rx       Brand Base Curve Diameter Sphere Cylinder Add    Right Air Optix Aqua Multifocal 8.60 14.2 -1.25 Sphere Low    Left Air Optix Aqua Multifocal 8.60 14.2 -0.50 Sphere Low    Expiration Date:  5/2/2019    Replacement:  Monthly    Solutions:  OptiFree PureMoist    Wearing Schedule:  Daily wear                  RTC 1 yr

## 2018-05-01 NOTE — PROGRESS NOTES
Contact lens exam done, see exam of same date for documentation.     Contact lens exam done, see exam of same date for documentation.

## 2018-05-17 DIAGNOSIS — M25.562 PAIN IN BOTH KNEES, UNSPECIFIED CHRONICITY: Primary | ICD-10-CM

## 2018-05-17 DIAGNOSIS — M25.561 PAIN IN BOTH KNEES, UNSPECIFIED CHRONICITY: Primary | ICD-10-CM

## 2018-05-21 ENCOUNTER — TELEPHONE (OUTPATIENT)
Dept: NEUROLOGY | Facility: CLINIC | Age: 48
End: 2018-05-21

## 2018-05-21 DIAGNOSIS — G43.709 CHRONIC MIGRAINE WITHOUT AURA WITHOUT STATUS MIGRAINOSUS, NOT INTRACTABLE: ICD-10-CM

## 2018-05-21 DIAGNOSIS — G44.229 CHRONIC TENSION-TYPE HEADACHE, NOT INTRACTABLE: ICD-10-CM

## 2018-05-21 RX ORDER — VERAPAMIL HYDROCHLORIDE 120 MG/1
120 CAPSULE, EXTENDED RELEASE ORAL DAILY
Qty: 30 CAPSULE | Refills: 3 | Status: SHIPPED | OUTPATIENT
Start: 2018-05-21 | End: 2018-08-08 | Stop reason: SDUPTHER

## 2018-05-30 ENCOUNTER — OFFICE VISIT (OUTPATIENT)
Dept: ORTHOPEDICS | Facility: CLINIC | Age: 48
End: 2018-05-30
Payer: COMMERCIAL

## 2018-05-30 ENCOUNTER — HOSPITAL ENCOUNTER (OUTPATIENT)
Dept: RADIOLOGY | Facility: HOSPITAL | Age: 48
Discharge: HOME | End: 2018-05-30
Attending: ORTHOPAEDIC SURGERY
Payer: COMMERCIAL

## 2018-05-30 VITALS
HEART RATE: 71 BPM | DIASTOLIC BLOOD PRESSURE: 83 MMHG | WEIGHT: 203.94 LBS | BODY MASS INDEX: 33.98 KG/M2 | SYSTOLIC BLOOD PRESSURE: 133 MMHG | HEIGHT: 65 IN

## 2018-05-30 DIAGNOSIS — M25.562 CHRONIC PAIN OF LEFT KNEE: Primary | ICD-10-CM

## 2018-05-30 DIAGNOSIS — M25.561 PAIN IN BOTH KNEES, UNSPECIFIED CHRONICITY: ICD-10-CM

## 2018-05-30 DIAGNOSIS — M25.562 PAIN IN BOTH KNEES, UNSPECIFIED CHRONICITY: ICD-10-CM

## 2018-05-30 DIAGNOSIS — G89.29 CHRONIC PAIN OF LEFT KNEE: Primary | ICD-10-CM

## 2018-05-30 PROCEDURE — 73562 X-RAY EXAM OF KNEE 3: CPT | Mod: TC,50

## 2018-05-30 PROCEDURE — 3075F SYST BP GE 130 - 139MM HG: CPT | Mod: CPTII,S$GLB,, | Performed by: ORTHOPAEDIC SURGERY

## 2018-05-30 PROCEDURE — 3079F DIAST BP 80-89 MM HG: CPT | Mod: CPTII,S$GLB,, | Performed by: ORTHOPAEDIC SURGERY

## 2018-05-30 PROCEDURE — 3008F BODY MASS INDEX DOCD: CPT | Mod: CPTII,S$GLB,, | Performed by: ORTHOPAEDIC SURGERY

## 2018-05-30 PROCEDURE — 99203 OFFICE O/P NEW LOW 30 MIN: CPT | Mod: S$GLB,,, | Performed by: ORTHOPAEDIC SURGERY

## 2018-05-30 PROCEDURE — 73562 X-RAY EXAM OF KNEE 3: CPT | Mod: 26,50,, | Performed by: RADIOLOGY

## 2018-05-30 PROCEDURE — 99999 PR PBB SHADOW E&M-EST. PATIENT-LVL III: CPT | Mod: PBBFAC,,, | Performed by: ORTHOPAEDIC SURGERY

## 2018-05-30 NOTE — LETTER
May 30, 2018      Popeye Vale MD  [address]  [facility] [address]           [facility]  [address], [address]th [address]  [facility] [address]  Phone: [phone]          Patient: Jennifer Bonilla   MR Number: [id number]   YOB: 1970   Date of Visit: 5/30/2018       Dear Dr. Popeye Vale:    Thank you for referring Jennifer Bonilla to me for evaluation. Attached you will find relevant portions of my assessment and plan of care.    If you have questions, please do not hesitate to call me. I look forward to following Jennifer Bonlila along with you.    Sincerely,    Kelton Sandoval MD    Enclosure  CC:  No Recipients    If you would like to receive this communication electronically, please contact [email or web address] or [phone] to request more information on [employer] Link access.    For providers and/or their staff who would like to refer a patient to [facility], please contact us through our one-stop-shop provider referral line, [facility], at [phone].    If you feel you have received this communication in error or would no longer like to receive these types of communications, please e-mail [email or web address]

## 2018-05-30 NOTE — PROGRESS NOTES
Subjective:      Patient ID: Jennifer Bonilla is a 47 y.o. female.    Chief Complaint: Pain of the Left Knee    HPI     Jennifer Bonilla is a 47 year old female here with a 1 year history of left knee pain. 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 There was not a history of trauma.  She feels the pain worsened when she began wearing shoe lift for a diagnosed leg length discrepancy.  The pain is severe. The pain is located in the medial aspect of the knee. There is is not radiation.  There is catching or locking.   The pain is described as sharp. The patient has not had prior surgery. It is aggravated by walking, prolonged standing.  It is alleviated by rest. There is not numbness or tingling of the lower extremity.  There is not back pain.  She  has tried ibuprofen and oral steroids.  She has tried one corticosteroid injection which provided good relief. She does have difficulty getting in or out of a car, getting dressed, or going up or down stairs.  The patient does not use an assistive device.    Past Medical History:   Diagnosis Date    Acid reflux     Chronic migraine w/o aura w/o status migrainosus, not intractable 03/21/2017    Interstitial cystitis 9/22/2014    Palpitations 4/1/2014    RBBB 05/09/2014    Pt's Ep study is not c/w WPW or Brugada syndrome.  She has RBBB with short [place] interval.    TMJ (temporomandibular joint disorder)     jaw clicking       Current Outpatient Prescriptions on File Prior to Visit   Medication Sig Dispense Refill    verapamil (VERELAN) 120 MG C24P Take 1 capsule (120 mg total) by mouth once daily. 30 capsule 3    nitrofurantoin, macrocrystal-monohydrate, (MACROBID) 100 MG capsule TAKE ONE CAPSULE BY MOUTH TWICE DAILY 14 capsule 1     No current facility-administered medications on file prior to visit.        Past Surgical History:   Procedure Laterality Date    ABDOMINAL SURGERY      APPENDECTOMY      CHOLECYSTECTOMY      CYSTOSCOPY      RADIOFREQUENCY ABLATION          Family History   Problem Relation Age of Onset    Cancer Father     Cancer Mother     No Known Problems Sister     No Known Problems Brother     No Known Problems Maternal Aunt     No Known Problems Maternal Uncle     No Known Problems Paternal Aunt     No Known Problems Paternal Uncle     No Known Problems Maternal Grandmother     No Known Problems Maternal Grandfather     No Known Problems Paternal Grandmother     No Known Problems Paternal Grandfather     Glaucoma Neg Hx     Amblyopia Neg Hx     Blindness Neg Hx     Cataracts Neg Hx     Diabetes Neg Hx     Hypertension Neg Hx     Macular degeneration Neg Hx     Retinal detachment Neg Hx     Strabismus Neg Hx     Stroke Neg Hx     Thyroid disease Neg Hx        Social History     Social History    Marital status:      Spouse name: N/A    Number of children: N/A    Years of education: N/A     Occupational History     [employer]     Social History Main Topics    Smoking status: Never Smoker    Smokeless tobacco: Never Used    Alcohol use 1.5 oz/week     3 Standard drinks or equivalent per week      Comment: socially    Drug use: No    Sexual activity: Yes     Partners: Male     Other Topics Concern    Not on file     Social History Narrative    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          Review of Systems   Constitution: Negative for chills and fever.   HENT: Negative for hearing loss.    Eyes: Negative for blurred vision and pain.   Cardiovascular: Negative for chest pain, claudication and dyspnea on exertion.   Respiratory: Negative for cough and shortness of breath.    Endocrine: Negative for cold intolerance and heat intolerance.   Musculoskeletal: Positive for joint pain.   Gastrointestinal: Negative for abdominal pain and change in bowel habit.   Genitourinary: Negative for bladder incontinence and dysuria.   Neurological: Negative for focal weakness, headaches, numbness and paresthesias.    Psychiatric/Behavioral: Negative for altered mental status, depression and hallucinations.         Objective:            General    Vitals reviewed.  Constitutional: She is oriented to person, place, and time. She appears well-developed and well-nourished.   HENT:   Head: Normocephalic and atraumatic.   Eyes: Conjunctivae and EOM are normal.   Neck: Normal range of motion.   Cardiovascular: Intact distal pulses.    Pulmonary/Chest: Effort normal. No respiratory distress.   Abdominal: She exhibits no distension.   Neurological: She is alert and oriented to person, place, and time.   Psychiatric: She has a normal mood and affect. Her behavior is normal.     General Musculoskeletal Exam   Gait: antalgic       Right Knee Exam     Inspection   Erythema: absent  Scars: absent  Swelling: absent  Effusion: effusion  Deformity: deformity  Bruising: absent    Tenderness   The patient is experiencing no tenderness.         Range of Motion   Extension: 0   Flexion: 120     Tests   Ligament Examination Lachman: normal (-1 to 2mm)   MCL - Valgus: normal (0 to 2mm)  LCL - Varus: normal  Patella   Passive Patellar Tilt: neutral    Other   Sensation: normal    Left Knee Exam     Inspection   Erythema: absent  Scars: absent  Swelling: present  Effusion: absent  Deformity: deformity  Bruising: absent    Tenderness   The patient tender to palpation of the medial joint line.    Crepitus   The patient has crepitus of the patella.    Range of Motion   Extension: 0   Flexion: 130     Tests   Meniscus   Steve:  Medial - positive (+ to pain.)   Stability Lachman: normal (-1 to 2mm)   MCL - Valgus: normal (0 to 2mm)  LCL - Varus: normal (0 to 2mm)  Patella   Passive Patellar Tilt: neutral  Patellar Tracking: normal    Other   Sensation: normal    Comments:  No hip pain with motion    Muscle Strength   Right Lower Extremity   Hip Abduction: 5/5   Quadriceps:  5/5   Hamstrin/5   Left Lower Extremity   Hip Abduction: 5/5   Quadriceps:   5/5   Hamstrin/5     Reflexes     Left Side  Quadriceps:  2+    Right Side   Quadriceps:  2+    Vascular Exam     Right Pulses  Dorsalis Pedis:      2+          Left Pulses  Dorsalis Pedis:      2+  Posterior Tibial:      2+        Edema  Right Lower Leg: absent  Left Lower Leg: absent      Radiographs taken today and reviewed by me demonstrate moderate arthritic change of the left KNEE(s).There  is not bone destruction.  There is not a fracture. The medial compartment is most involved.  There is a varus deformity.              Assessment:       Encounter Diagnosis   Name Primary?    Chronic pain of left knee Yes          Plan:       Jennifer was seen today for pain.    Diagnoses and all orders for this visit:    Chronic pain of left knee  -     MRI Knee Without Contrast Left; Future      Options were discussed at length.  She has had pain for greater than three months.  There is no relief with activity modification or NSAIDS.  Sheis having mechanical symptoms.  An MRI is indicated.  F/U after the MRI.

## 2018-06-06 ENCOUNTER — HOSPITAL ENCOUNTER (OUTPATIENT)
Dept: RADIOLOGY | Facility: HOSPITAL | Age: 48
Discharge: HOME | End: 2018-06-06
Attending: ORTHOPAEDIC SURGERY
Payer: COMMERCIAL

## 2018-06-06 DIAGNOSIS — G89.29 CHRONIC PAIN OF LEFT KNEE: ICD-10-CM

## 2018-06-06 DIAGNOSIS — M25.562 CHRONIC PAIN OF LEFT KNEE: ICD-10-CM

## 2018-06-06 PROCEDURE — 73721 MRI JNT OF LWR EXTRE W/O DYE: CPT | Mod: TC,LT

## 2018-06-06 PROCEDURE — 73721 MRI JNT OF LWR EXTRE W/O DYE: CPT | Mod: 26,LT,, | Performed by: RADIOLOGY

## 2018-06-08 ENCOUNTER — TELEPHONE (OUTPATIENT)
Dept: ORTHOPEDICS | Facility: CLINIC | Age: 48
End: 2018-06-08

## 2018-06-08 NOTE — TELEPHONE ENCOUNTER
Spoke to pt notified her of her MRI results and scheduled a f/u appt. Pt verbalized understanding and had no further questions.      ----- Message from Isaias Pedersen sent at 6/8/2018  1:44 PM CDT -----      ----- Message -----  From: Kelton Sandoval MD  Sent: 6/8/2018  12:04 PM  To: Jaime ELAINE Staff    Please call pt.  She has a meniscus tear and some arthritis.  Please schedule f/u

## 2018-06-11 ENCOUNTER — OFFICE VISIT (OUTPATIENT)
Dept: ORTHOPEDICS | Facility: CLINIC | Age: 48
End: 2018-06-11
Payer: COMMERCIAL

## 2018-06-11 VITALS — BODY MASS INDEX: 34.24 KG/M2 | HEIGHT: 65 IN | WEIGHT: 205.5 LBS

## 2018-06-11 DIAGNOSIS — S83.232D COMPLEX TEAR OF MEDIAL MENISCUS OF LEFT KNEE AS CURRENT INJURY, SUBSEQUENT ENCOUNTER: Primary | ICD-10-CM

## 2018-06-11 PROCEDURE — 3008F BODY MASS INDEX DOCD: CPT | Mod: CPTII,S$GLB,, | Performed by: ORTHOPAEDIC SURGERY

## 2018-06-11 PROCEDURE — 99213 OFFICE O/P EST LOW 20 MIN: CPT | Mod: S$GLB,,, | Performed by: ORTHOPAEDIC SURGERY

## 2018-06-11 PROCEDURE — 99999 PR PBB SHADOW E&M-EST. PATIENT-LVL III: CPT | Mod: PBBFAC,,, | Performed by: ORTHOPAEDIC SURGERY

## 2018-06-11 NOTE — PROGRESS NOTES
"Subjective:      Patient ID: Jennifer Bonilla is a 47 y.o. female.    Chief Complaint: Pain of the Left Knee    HPI  Jennifer Bonilla has left knee pain.  The pain is unchanged. The pain is located in the medial aspect of the knee.  There  is radiation. Some radiation posteriorly.  The pain radiates to the calf.  There is not associated stiffness.   There is catching and locking. The pain is described as achy. The pain is aggravated by pivtoting.  It is alleviated by rest on occasion.  There is not associated back pain.  Her history, medications and problem list were reviewed.    Review of Systems   Constitution: Negative for chills, fever and night sweats.   HENT: Negative for hearing loss.    Eyes: Negative for blurred vision and double vision.   Cardiovascular: Negative for chest pain, claudication and leg swelling.   Respiratory: Negative for shortness of breath.    Endocrine: Negative for polydipsia, polyphagia and polyuria.   Hematologic/Lymphatic: Negative for adenopathy and bleeding problem. Does not bruise/bleed easily.   Skin: Negative for poor wound healing.   Musculoskeletal: Positive for joint pain.   Gastrointestinal: Negative for diarrhea and heartburn.   Genitourinary: Negative for bladder incontinence.   Neurological: Negative for focal weakness, headaches, numbness, paresthesias and sensory change.   Psychiatric/Behavioral: The patient is not nervous/anxious.    Allergic/Immunologic: Negative for persistent infections.         Objective:      Body mass index is 34.19 kg/m².  Vitals:    06/11/18 1546   Weight: 93.2 kg (205 lb 7.5 oz)   Height: 5' 5" (1.651 m)           General    Constitutional: She is oriented to person, place, and time. She appears well-developed and well-nourished.   HENT:   Head: Normocephalic and atraumatic.   Eyes: EOM are normal.   Cardiovascular: Normal rate.    Pulmonary/Chest: Effort normal.   Neurological: She is alert and oriented to person, place, and time.   Psychiatric: " She has a normal mood and affect. Her behavior is normal.     General Musculoskeletal Exam   Gait: normal       Right Knee Exam     Inspection   Erythema: absent  Scars: absent  Swelling: absent  Effusion: effusion  Deformity: deformity  Bruising: absent    Tenderness   The patient is experiencing no tenderness.         Range of Motion   Extension: 0   Flexion: 130     Tests   Ligament Examination Lachman: normal (-1 to 2mm)   MCL - Valgus: normal (0 to 2mm)  LCL - Varus: normal  Patella   Passive Patellar Tilt: neutral    Other   Sensation: normal    Left Knee Exam     Inspection   Erythema: absent  Scars: absent  Swelling: present  Effusion: absent  Deformity: deformity  Bruising: absent    Tenderness   The patient tender to palpation of the medial joint line.    Range of Motion   Extension: 0   Flexion: 130     Tests   Meniscus   Steve:  Medial - positive   Stability Lachman: normal (-1 to 2mm)   MCL - Valgus: normal (0 to 2mm)  LCL - Varus: normal (0 to 2mm)  Patella   Passive Patellar Tilt: neutral    Other   Popliteal (Baker's) Cyst: present  Sensation: normal    Muscle Strength   Right Lower Extremity   Hip Abduction: 5/5   Quadriceps:  5/5   Hamstrin/5   Left Lower Extremity   Hip Abduction: 5/5   Quadriceps:  5/5   Hamstrin/5     Reflexes     Left Side  Quadriceps:  2+    Right Side   Quadriceps:  2+    Vascular Exam     Right Pulses  Dorsalis Pedis:      2+          Left Pulses  Dorsalis Pedis:      2+          Edema  Right Lower Leg: absent  Left Lower Leg: absent      MRI was reviewed.  Radial medial meniscus tear.  Medial and patellofemoral chondromalacia.  Baker's Cyst        Assessment:       Encounter Diagnosis   Name Primary?    Complex tear of medial meniscus of left knee as current injury, subsequent encounter Yes          Plan:       Jennifer was seen today for pain.    Diagnoses and all orders for this visit:    Complex tear of medial meniscus of left knee as current injury,  subsequent encounter        Treatment options were discussed with Jennifer Bonilla  The patient wishes to proceed with left knee arthroscopy and is aware of risks such as infection, stiffness, continued pain, the need for further treatment and surgery, as well as anesthetic complications.  We will get this set up in August, when her work schedule permits..

## 2018-07-16 ENCOUNTER — TELEPHONE (OUTPATIENT)
Dept: ORTHOPEDICS | Facility: CLINIC | Age: 48
End: 2018-07-16

## 2018-07-16 NOTE — TELEPHONE ENCOUNTER
----- Message from Paulina Turk MA sent at 7/16/2018 10:03 AM CDT -----  Contact: self      ----- Message -----  From: Anna Graham  Sent: 7/16/2018   9:43 AM  To: Jaime ELAINE Staff    Pt called and stated that she needs to schedule a surgery date with Dr. Sandoval. Pt stated that she has left messages multiple times, and has been unable to get a call back from anyone to get this set up. Pt would like for the nurse to give her a call back before the end of today to schedule her surgery.     Pt can be reached at [phone] (cell)

## 2018-07-16 NOTE — TELEPHONE ENCOUNTER
Spoke with pt, scheduled surgery for left knee scope on 8/16. Scheduled pre-op and post-op appointments, slips mailed, understanding verbalized.

## 2018-07-17 DIAGNOSIS — S83.232D COMPLEX TEAR OF MEDIAL MENISCUS OF LEFT KNEE AS CURRENT INJURY, SUBSEQUENT ENCOUNTER: Primary | ICD-10-CM

## 2018-07-23 ENCOUNTER — TELEPHONE (OUTPATIENT)
Dept: NEUROLOGY | Facility: CLINIC | Age: 48
End: 2018-07-23

## 2018-07-23 NOTE — TELEPHONE ENCOUNTER
----- Message from Dejah Bryant sent at 7/23/2018  1:01 PM CDT -----  Contact: pt            Name of Who is Calling: Jennifer    What is the request in detail: requesting a call back  In reference to rescheduling appt. Im unable to pull up  Schedule. Please call and advise      Can the clinic reply by [facility]: no      What Number to Call Back if not in [facility]: [phone]

## 2018-08-05 ENCOUNTER — HOSPITAL ENCOUNTER (EMERGENCY)
Facility: HOSPITAL | Age: 48
Discharge: HOME | End: 2018-08-05
Attending: EMERGENCY MEDICINE
Payer: COMMERCIAL

## 2018-08-05 VITALS
RESPIRATION RATE: 18 BRPM | HEIGHT: 65 IN | BODY MASS INDEX: 32.65 KG/M2 | DIASTOLIC BLOOD PRESSURE: 68 MMHG | WEIGHT: 196 LBS | TEMPERATURE: 98 F | OXYGEN SATURATION: 99 % | HEART RATE: 87 BPM | SYSTOLIC BLOOD PRESSURE: 138 MMHG

## 2018-08-05 DIAGNOSIS — S61.224A LACERATION OF RIGHT RING FINGER WITH FOREIGN BODY WITHOUT DAMAGE TO NAIL, INITIAL ENCOUNTER: Primary | ICD-10-CM

## 2018-08-05 LAB
B-HCG UR QL: NEGATIVE
CTP QC/QA: YES

## 2018-08-05 PROCEDURE — 96366 THER/PROPH/DIAG IV INF ADDON: CPT

## 2018-08-05 PROCEDURE — 10120 INC&RMVL FB SUBQ TISS SMPL: CPT

## 2018-08-05 PROCEDURE — 90471 IMMUNIZATION ADMIN: CPT | Performed by: PHYSICIAN ASSISTANT

## 2018-08-05 PROCEDURE — 63600175 PHARM REV CODE 636 W HCPCS: Performed by: PHYSICIAN ASSISTANT

## 2018-08-05 PROCEDURE — 25000242 PHARM REV CODE 250 ALT 637 W/ HCPCS: Performed by: NURSE PRACTITIONER

## 2018-08-05 PROCEDURE — 63600175 PHARM REV CODE 636 W HCPCS: Performed by: NURSE PRACTITIONER

## 2018-08-05 PROCEDURE — 81025 URINE PREGNANCY TEST: CPT | Performed by: PHYSICIAN ASSISTANT

## 2018-08-05 PROCEDURE — 99285 EMERGENCY DEPT VISIT HI MDM: CPT | Mod: 25

## 2018-08-05 PROCEDURE — 25000003 PHARM REV CODE 250: Performed by: NURSE PRACTITIONER

## 2018-08-05 PROCEDURE — 94640 AIRWAY INHALATION TREATMENT: CPT

## 2018-08-05 PROCEDURE — 90715 TDAP VACCINE 7 YRS/> IM: CPT | Performed by: PHYSICIAN ASSISTANT

## 2018-08-05 PROCEDURE — 96365 THER/PROPH/DIAG IV INF INIT: CPT

## 2018-08-05 PROCEDURE — 96375 TX/PRO/DX INJ NEW DRUG ADDON: CPT

## 2018-08-05 PROCEDURE — 25000003 PHARM REV CODE 250: Performed by: PHYSICIAN ASSISTANT

## 2018-08-05 RX ORDER — DOXYCYCLINE 100 MG/1
100 CAPSULE ORAL 2 TIMES DAILY
Qty: 14 CAPSULE | Refills: 0 | Status: SHIPPED | OUTPATIENT
Start: 2018-08-05 | End: 2018-08-12

## 2018-08-05 RX ORDER — ALBUTEROL SULFATE 2.5 MG/.5ML
2.5 SOLUTION RESPIRATORY (INHALATION)
Status: COMPLETED | OUTPATIENT
Start: 2018-08-05 | End: 2018-08-05

## 2018-08-05 RX ORDER — LIDOCAINE HYDROCHLORIDE 10 MG/ML
10 INJECTION INFILTRATION; PERINEURAL
Status: COMPLETED | OUTPATIENT
Start: 2018-08-05 | End: 2018-08-05

## 2018-08-05 RX ORDER — DIPHENHYDRAMINE HYDROCHLORIDE 50 MG/ML
25 INJECTION INTRAMUSCULAR; INTRAVENOUS
Status: COMPLETED | OUTPATIENT
Start: 2018-08-05 | End: 2018-08-05

## 2018-08-05 RX ORDER — DIPHENHYDRAMINE HYDROCHLORIDE 50 MG/ML
12.5 INJECTION INTRAMUSCULAR; INTRAVENOUS
Status: DISCONTINUED | OUTPATIENT
Start: 2018-08-05 | End: 2018-08-05

## 2018-08-05 RX ORDER — DOXYCYCLINE HYCLATE 100 MG
100 TABLET ORAL
Status: DISCONTINUED | OUTPATIENT
Start: 2018-08-05 | End: 2018-08-05 | Stop reason: HOSPADM

## 2018-08-05 RX ADMIN — ALBUTEROL SULFATE 2.5 MG: 2.5 SOLUTION RESPIRATORY (INHALATION) at 06:08

## 2018-08-05 RX ADMIN — PIPERACILLIN AND TAZOBACTAM 4.5 G: 4; .5 INJECTION, POWDER, LYOPHILIZED, FOR SOLUTION INTRAVENOUS; PARENTERAL at 05:08

## 2018-08-05 RX ADMIN — CLOSTRIDIUM TETANI TOXOID ANTIGEN (FORMALDEHYDE INACTIVATED), CORYNEBACTERIUM DIPHTHERIAE TOXOID ANTIGEN (FORMALDEHYDE INACTIVATED), BORDETELLA PERTUSSIS TOXOID ANTIGEN (GLUTARALDEHYDE INACTIVATED), BORDETELLA PERTUSSIS FILAMENTOUS HEMAGGLUTININ ANTIGEN (FORMALDEHYDE INACTIVATED), BORDETELLA PERTUSSIS PERTACTIN ANTIGEN, AND BORDETELLA PERTUSSIS FIMBRIAE 2/3 ANTIGEN 0.5 ML: 5; 2; 2.5; 5; 3; 5 INJECTION, SUSPENSION INTRAMUSCULAR at 05:08

## 2018-08-05 RX ADMIN — SODIUM CHLORIDE 1000 ML: 0.9 INJECTION, SOLUTION INTRAVENOUS at 06:08

## 2018-08-05 RX ADMIN — DIPHENHYDRAMINE HYDROCHLORIDE 25 MG: 50 INJECTION, SOLUTION INTRAMUSCULAR; INTRAVENOUS at 06:08

## 2018-08-05 RX ADMIN — LIDOCAINE HYDROCHLORIDE 10 ML: 10 INJECTION, SOLUTION INFILTRATION; PERINEURAL at 05:08

## 2018-08-05 NOTE — ED PROVIDER NOTES
"Encounter Date: 8/5/2018    SORT:  47 y.o. female presenting to ED for wooden splinter to R 4th digit from a dock [place]". Denies fever and numbness. Limited triage exam:  Non-toxic. Appear in pain. If orders were placed, they are pending.     Brandon Jack PA-C  08/05/2018  4:20 PM      History     Chief Complaint   Patient presents with    Hand Pain     while coming into dock on boat she pushed againt dock and wooden splinter lodged in finger on right hand     CC:  Splinter    HPI:  47 y.o. white female presents with pain to the right ring finger.  States that she was running her hands along the wooden deck when a splinter injected itself into the palmar side of the right ring finger.  This occurred just prior to arrival.  She believes her tetanus shot is not up-to-date.  Reports difficulty with movement of the finger.      The history is provided by the patient. No  was used.     Review of patient's allergies indicates:   Allergen Reactions    Codeine Anaphylaxis    Capsicum Other (See Comments)     others     Past Medical History:   Diagnosis Date    Acid reflux     Chronic migraine w/o aura w/o status migrainosus, not intractable 03/21/2017    Interstitial cystitis 9/22/2014    Palpitations 4/1/2014    RBBB 05/09/2014    Pt's Ep study is not c/w WPW or Brugada syndrome.  She has RBBB with short [place] interval.    TMJ (temporomandibular joint disorder)     jaw clicking     Past Surgical History:   Procedure Laterality Date    ABDOMINAL SURGERY      APPENDECTOMY      CHOLECYSTECTOMY      CYSTOSCOPY      RADIOFREQUENCY ABLATION       Family History   Problem Relation Age of Onset    Cancer Father     Cancer Mother     No Known Problems Sister     No Known Problems Brother     No Known Problems Maternal Aunt     No Known Problems Maternal Uncle     No Known Problems Paternal Aunt     No Known Problems Paternal Uncle     No Known Problems Maternal Grandmother     No " Known Problems Maternal Grandfather     No Known Problems Paternal Grandmother     No Known Problems Paternal Grandfather     Glaucoma Neg Hx     Amblyopia Neg Hx     Blindness Neg Hx     Cataracts Neg Hx     Diabetes Neg Hx     Hypertension Neg Hx     Macular degeneration Neg Hx     Retinal detachment Neg Hx     Strabismus Neg Hx     Stroke Neg Hx     Thyroid disease Neg Hx      Social History   Substance Use Topics    Smoking status: Never Smoker    Smokeless tobacco: Never Used    Alcohol use 1.5 oz/week     3 Standard drinks or equivalent per week      Comment: socially     Review of Systems   Constitutional: Negative for chills, fatigue and fever.   HENT: Negative for congestion, ear discharge, ear pain, postnasal drip, rhinorrhea, sinus pressure, sneezing, sore throat and voice change.    Eyes: Negative for discharge and itching.   Respiratory: Negative for cough, shortness of breath and wheezing.    Cardiovascular: Negative for chest pain, palpitations and leg swelling.   Gastrointestinal: Negative for abdominal pain, constipation, diarrhea, nausea and vomiting.   Endocrine: Negative for polydipsia, polyphagia and polyuria.   Genitourinary: Negative for dysuria, frequency, hematuria, urgency, vaginal bleeding, vaginal discharge and vaginal pain.   Musculoskeletal: Positive for arthralgias and myalgias.   Skin: Positive for wound. Negative for rash.   Neurological: Negative for dizziness, seizures, syncope, weakness and numbness.   Hematological: Negative for adenopathy. Does not bruise/bleed easily.   Psychiatric/Behavioral: Negative for self-injury and suicidal ideas. The patient is not nervous/anxious.        Physical Exam     Initial Vitals [08/05/18 1618]   BP Pulse Resp Temp SpO2   (!) 204/105 86 20 98.2 °F (36.8 °C) 99 %      MAP       --         Physical Exam    Nursing note and vitals reviewed.  Constitutional: She appears well-developed and well-nourished.   HENT:   Head:  Normocephalic and atraumatic.   Right Ear: External ear normal.   Left Ear: External ear normal.   Nose: Nose normal.   Eyes: Conjunctivae and EOM are normal. Pupils are equal, round, and reactive to light. Right eye exhibits no discharge. Left eye exhibits no discharge.   Neck: Normal range of motion.   Abdominal: She exhibits no distension.   Musculoskeletal: Normal range of motion.        Right hand: Decreased sensation is not present in the ulnar distribution, is not present in the medial distribution and is not present in the radial distribution. Right ring finger: Exhibits decreased ROM, swelling and tenderness. Injuries: foreign body and puncture wound. Motor /Testing: Ring Finger: 4/5.   Neurological: She is alert and oriented to person, place, and time.   Skin: Skin is dry. Capillary refill takes less than 2 seconds.         ED Course   Procedures  Labs Reviewed   POCT URINE PREGNANCY          Imaging Results          US Extremity Non Vascular Limited Right (Final result)  Result time 08/05/18 18:48:53   Procedure changed from US Soft Tissue Misc     Final result by Robert Ritter MD (08/05/18 18:48:53)                 Impression:      Small 6 mm linear echogenic structure in the region of concern which may reflect foreign body.      Electronically signed by: Robert Ritter MD  Date:    08/05/2018  Time:    18:48             Narrative:    EXAMINATION:  US EXTREMITY NON VASCULAR LIMITED RIGHT    CLINICAL HISTORY:  for foreign body, right ring finger;    COMPARISON:  None    FINDINGS:  Limited ultrasound evaluation was performed of the right 4th finger region of concern to assess for foreign body.  There is linear 6 mm echogenic structure which could represent retained foreign body with suspected mild adjacent edema.  No focal fluid collections are seen.  No evidence of hypervascularity in the region.                               X-Ray Finger 2 or More Views Right (Final result)  Result time 08/05/18  17:25:29    Final result by Robert Ritter MD (08/05/18 17:25:29)                 Impression:      As above.      Electronically signed by: Robert Ritter MD  Date:    08/05/2018  Time:    17:25             Narrative:    EXAMINATION:  XR FINGER 2 OR MORE VIEWS RIGHT    CLINICAL HISTORY:  possible wooden splinter to R 4th digit;    COMPARISON:  None    FINDINGS:  No evidence of fracture, dislocation, or osseous destructive process.  No radiopaque retained foreign body seen.                                                   ED Course as of Aug 05 2022   Sun Aug 05, 2018   1714 Quick look note reviewed.  VS normal.   [VC]   1714 BP: (!) 188/84 [VC]   1714 Temp: 98.2 °F (36.8 °C) [VC]   1714 Pulse: 86 [VC]   1714 Resp: 20 [VC]   1714 SpO2: 99 % [VC]   1714 MEDICAL DECISION MAKING    INITIAL ASSESSMENT: 47 y.o. Wf states she was running her hands along a piece of wooden dock and believes a long splinter inserted itself under the skin.  C/o swelling and pain to the right ring finger.  Decreased ROm, swelling and redness present.  NO pain with passive extension, not holding in flexion.  NO fusiform swelling.      ED COURSE: xray neg for fracture, dislocation or fb    LABS AND RADIOLOGY: upt neg    MEDICATIONS ADMINISTERED: adacel 0.5ml IM, doxycycline 100mg, zosyn 4.5g iv, benadryl 25mg iv, albuterol 2.5mg neb    DIFFERENTIAL DIAGNOSES:fb, flexor tenosynovitis, cellulitis    ACUTE DIAGNOSIS (ES): foreign body right ring finger    D/C PLANNING AND MEDICATIONS ORDRED: doxycyline 100mg po bid, pt declined pain meds    FOLLOW UP:  f/u with Dr. Alcaraz in his office    PHYSICIAN INTERACTION:   Care of the patient discussed with Dr. Emmanuel who agreed with the assessment and plan. [VC]   1721 Dorsal digital nerve block of the right ring finger performed.  Pt reports full relief of discomfort.  [VC]   1725 Dr. Alcaraz being paged.  [VC]   1736 No evidence of fracture, dislocation, or osseous destructive process.  No  "radiopaque retained foreign body seen. X-Ray Finger 2 or More Views Right [VC]   1748 Dr. Alcaraz returned call and was given SBAR by me then was connected to Dr. Emmanuel for further information.  [VC]   1751 Dr. Alcaraz now talking to patient, call was transferred to personal cell.  [VC]   1800 Pt reports "feels like something in her throat". Oropharynx is clear.  BBS CTA.  100% SpO2 on RA.  [VC]   1803 Pulse: 75 [VC]   1803 SpO2: 100 % [VC]   1852   Small 6 mm linear echogenic structure in the region of concern which may reflect foreign body. US Extremity Non Vascular Limited Right [VC]   1904 Pulse: (!) 58 [VC]   1904 Resp: 18 [VC]   1904 Dr. Alcaraz in room now performing procedure.    [VC]      ED Course User Index  [VC] Josiah Amador DNP     Clinical Impression:   The encounter diagnosis was Laceration of right ring finger with foreign body without damage to nail, initial encounter.      Disposition:   Disposition: Discharged  Condition: Stable                        Josiah Amador DNP  08/05/18 2026    "

## 2018-08-05 NOTE — ED NOTES
Patient presented to the ED stating that she has a splinter in her right ring finger. Patient denies any NVD. Patient rated pain 10/10.

## 2018-08-05 NOTE — ED NOTES
Pt began coughing and stated she believed she was having a reaction to the Zosyn. IVPB stopped vitals obtained. EBONY Amador DNP  at bedside, pt assessed, orders placed. NAD noted will continue to monitor.

## 2018-08-06 NOTE — PROCEDURES
ORTHO PROCEDURE    Diagnosis: Right 4th finger foreign body    Procedure performed:    1)Right 4th finger removal of foreign body  2)Right 4th finger Digital block X 2    Blood loss 5 cc  Complications none    HPI:  Jennifer Bonillais47 y.o. complaining of right 4th finger foreign body. The patient was docking a boat today when a piece of wood went into her finger. She immediately had pain with swelling and erythema to the site if the injury. She presented to the ED  Orthopedics was consulted to remove the foreign body. Ultrasound showed foreign body small 6 mm piece of wood    Procedure in detail    Gregory,     The right 4th finger was digitally blocked radial and ulnar aspect.     A small finger torniquete was used  Incision made directly over the site of foreign body entry along the length of the foreign body. Area was opened and probed and irrigated. The foreign body appeared to have been a small piece of wood which flowed out with the irrigation. We continued to irrigate the wound to wash it thoughouly. We coles the incision with 2 simpe sutures with 5-0 nylon. sterile dressing applied to the wound. Pt tolerated procedure well.     Will be dced home with PO abx

## 2018-08-06 NOTE — DISCHARGE INSTRUCTIONS
Alternate tylenol and ibuprofen every 3h for pain relief.  Observe wound for redness warmth or exudate. Return to the Emergency department for any worsening or failure to improve, otherwise follow up with your primary care provider.

## 2018-08-06 NOTE — CONSULTS
TYLER. Right 4th finger foreign body    HPI: Jennifer Dean47 y.o. complaining of right 4th finger foreign body. The patient was docking a boat today when a piece of wood went into her finger. She immediately had pain with swelling and erythema to the site if the injury. She presented to the ED  Orthopedics was consulted to remove the foreign body. Ultrasound showed foreign body small 6 mm piece of wood    ROS:   Pertinent positives: right 4th finger pain   Negatives: F/C, N/V, CP, SOB,    All other 14 point ROS negative    PMH:   Past Medical History:   Diagnosis Date    Acid reflux     Chronic migraine w/o aura w/o status migrainosus, not intractable 03/21/2017    Interstitial cystitis 9/22/2014    Palpitations 4/1/2014    RBBB 05/09/2014    Pt's Ep study is not c/w WPW or Brugada syndrome.  She has RBBB with short OK interval.    TMJ (temporomandibular joint disorder)     jaw clicking       PSH:   Past Surgical History:   Procedure Laterality Date    ABDOMINAL SURGERY      APPENDECTOMY      CHOLECYSTECTOMY      CYSTOSCOPY      RADIOFREQUENCY ABLATION         Social Hx:   Social History     Occupational History     [employer]     Social History Main Topics    Smoking status: Never Smoker    Smokeless tobacco: Never Used    Alcohol use 1.5 oz/week     3 Standard drinks or equivalent per week      Comment: socially    Drug use: No    Sexual activity: Yes     Partners: Male       Medications:    No current facility-administered medications on file prior to encounter.      Current Outpatient Prescriptions on File Prior to Encounter   Medication Sig Dispense Refill    verapamil (VERELAN) 120 MG C24P Take 1 capsule (120 mg total) by mouth once daily. 30 capsule 3    [DISCONTINUED] nitrofurantoin, macrocrystal-monohydrate, (MACROBID) 100 MG capsule TAKE ONE CAPSULE BY MOUTH TWICE DAILY 14 capsule 1         PE:         Vitals:    08/05/18 1937   BP: 138/68   Pulse: 87   Resp: 18   Temp:  "97.8 °F (36.6 °C)       Estimated body mass index is 32.62 kg/m² as calculated from the following:    Height as of this encounter: 5' 5" (1.651 m).    Weight as of this encounter: 88.9 kg (196 lb).     General WDWN, NAD     Extremity:     Right 4th finger pain with palpation over entry site.  Very small entry sit and area of pigmentation from debri at the potential exit site.  All NVI intact distally    Labs:    Lab Results   Component Value Date    WBC 7.72 12/12/2017    HGB 13.4 12/12/2017    HCT 39.5 12/12/2017    MCV 88 12/12/2017     12/12/2017       BMP  Lab Results   Component Value Date     12/12/2017    K 4.5 12/12/2017     12/12/2017    CO2 27 12/12/2017    BUN 12 12/12/2017    CREATININE 0.9 12/12/2017    CALCIUM 9.6 12/12/2017    ANIONGAP 8 12/12/2017    ESTGFRAFRICA >60 12/12/2017    EGFRNONAA >60 12/12/2017       Lab Results   Component Value Date    INR 0.9 05/06/2014    INR 0.9 12/23/2008       No results found for: SEDRATE    No results found for: CRP    Radiography:  Film    Interpretation  Xray does not show any foreign body  US showed small 6 mm density     A/P  47 y.o.female with right 4th finger foreign body with potential for water born infection    Will proceed with removal of foreign body.    Please see procedure note from same date      Alexis Alcaraz MD   "

## 2018-08-08 ENCOUNTER — OFFICE VISIT (OUTPATIENT)
Dept: NEUROLOGY | Facility: CLINIC | Age: 48
End: 2018-08-08
Payer: COMMERCIAL

## 2018-08-08 VITALS
WEIGHT: 198 LBS | BODY MASS INDEX: 32.99 KG/M2 | DIASTOLIC BLOOD PRESSURE: 98 MMHG | HEIGHT: 65 IN | HEART RATE: 68 BPM | SYSTOLIC BLOOD PRESSURE: 148 MMHG

## 2018-08-08 DIAGNOSIS — G44.229 CHRONIC TENSION-TYPE HEADACHE, NOT INTRACTABLE: ICD-10-CM

## 2018-08-08 DIAGNOSIS — G43.709 CHRONIC MIGRAINE W/O AURA W/O STATUS MIGRAINOSUS, NOT INTRACTABLE: ICD-10-CM

## 2018-08-08 DIAGNOSIS — G43.709 CHRONIC MIGRAINE WITHOUT AURA WITHOUT STATUS MIGRAINOSUS, NOT INTRACTABLE: ICD-10-CM

## 2018-08-08 PROCEDURE — 99214 OFFICE O/P EST MOD 30 MIN: CPT | Mod: S$GLB,,, | Performed by: PSYCHIATRY & NEUROLOGY

## 2018-08-08 PROCEDURE — 3008F BODY MASS INDEX DOCD: CPT | Mod: CPTII,S$GLB,, | Performed by: PSYCHIATRY & NEUROLOGY

## 2018-08-08 PROCEDURE — 3077F SYST BP >= 140 MM HG: CPT | Mod: CPTII,S$GLB,, | Performed by: PSYCHIATRY & NEUROLOGY

## 2018-08-08 PROCEDURE — 3080F DIAST BP >= 90 MM HG: CPT | Mod: CPTII,S$GLB,, | Performed by: PSYCHIATRY & NEUROLOGY

## 2018-08-08 PROCEDURE — 99999 PR PBB SHADOW E&M-EST. PATIENT-LVL III: CPT | Mod: PBBFAC,,, | Performed by: PSYCHIATRY & NEUROLOGY

## 2018-08-08 RX ORDER — VERAPAMIL HYDROCHLORIDE 120 MG/1
120 CAPSULE, EXTENDED RELEASE ORAL DAILY
Qty: 90 CAPSULE | Refills: 3 | Status: SHIPPED | OUTPATIENT
Start: 2018-08-08 | End: 2019-09-05 | Stop reason: SDUPTHER

## 2018-08-08 NOTE — PROGRESS NOTES
Subjective:       Patient ID: Jennifer Bonilla is a 47 y.o. female.    Reason for Consult: Migraine      Interval History:  Jennifer Bonilla is here for follow up. Their condition is relatively clinically stable.  The patient has been working with her OBGYN and his figured out that she is starting to go through premenopausal changes, which is likely the result of her flushing and other issues.  We have discussed that this is not likely the medication.  She did stop the medication of her own volition and noticed an increase in her headaches.  She notes that she is still dealing with at least 3-4 headaches a week with headaches occurring at least 12 times a month.  She notes that this is despite the verapamil 0.120 mg.  She is considering hormone replacement therapy to help with some of her other perimenopausal changes.  We have discussed the new FDA approved anti C Grp antibody therapy.        Objective:     Vitals:    08/08/18 1558   BP: (!) 148/98   Pulse: 68     Patient is awake, alert and oriented to person, place and time. Moves all extremities antigravity. Cranial Nerves 2-12 without focal deficit. Gait and station normal.    Focused examination was undertaken today. Over 50% of face to face time of 25 minute visit time was in giving guidance, counseling and discussing treatment options.      Assessment/Plan:     Problem List Items Addressed This Visit        Neuro    Chronic migraine w/o aura w/o status migrainosus, not intractable    Overview     Verapamil 120mg  Good control  3 per week, 12 headaches per month    Discussed Aimovig - deferred for now    Tried and failed Topamax, Verapamil, Elavil           Other Visit Diagnoses     Chronic tension-type headache, not intractable        Relevant Medications    verapamil (VERELAN) 120 MG C24P    Chronic migraine without aura without status migrainosus, not intractable        Relevant Medications    verapamil (VERELAN) 120 MG C24P        47-year-old right-handed  female presents for evaluation of worsening headaches.  At this time and has been determined that the patient may actually be suffering from perimenopausal changes and hormonal fluctuations which are worsen her headaches.  For now she is still having at least 12 days of headache a month despite taking verapamil 120 mg daily.  She notes that this brings her headaches down to an acceptable level.  We have discussed Aimovig at length today but for now she defers this option.  I will see her back in a year or sooner should her headaches change or worsen.    I will follow up with them in 12 month(s).  The patient verbalizes understanding and agreement with the treatment plan. I have discussed risks, benefits and alternatives to the treatment plan. Questions were sought and answered to her stated verbal satisfaction.        Amirah Dial MD    This note is dictated on M*Modal Fluency Direct word recognition program. There are word recognition mistakes that are occasionally missed on review.

## 2018-08-13 ENCOUNTER — OFFICE VISIT (OUTPATIENT)
Dept: ORTHOPEDICS | Facility: CLINIC | Age: 48
End: 2018-08-13
Payer: COMMERCIAL

## 2018-08-13 VITALS — HEIGHT: 65 IN | WEIGHT: 207.69 LBS | BODY MASS INDEX: 34.6 KG/M2

## 2018-08-13 DIAGNOSIS — M23.92 INTERNAL DERANGEMENT OF LEFT KNEE: Primary | ICD-10-CM

## 2018-08-13 DIAGNOSIS — M23.90 INTERNAL DERANGEMENT OF KNEE: ICD-10-CM

## 2018-08-13 PROCEDURE — 99499 UNLISTED E&M SERVICE: CPT | Mod: S$GLB,,, | Performed by: NURSE PRACTITIONER

## 2018-08-13 PROCEDURE — 99999 PR PBB SHADOW E&M-EST. PATIENT-LVL III: CPT | Mod: PBBFAC,,, | Performed by: NURSE PRACTITIONER

## 2018-08-13 RX ORDER — MUPIROCIN 20 MG/G
OINTMENT TOPICAL
Status: CANCELLED | OUTPATIENT
Start: 2018-08-13

## 2018-08-13 RX ORDER — SODIUM CHLORIDE 9 MG/ML
INJECTION, SOLUTION INTRAVENOUS CONTINUOUS
Status: CANCELLED | OUTPATIENT
Start: 2018-08-13

## 2018-08-13 NOTE — H&P
CC: Left knee pain    Jennifer Bonilla is a 47 y.o. female with a history of Left knee pain. Pain is worse with activity and weight bearing.  Patient has experienced interference of activities of daily living due to decreased range of motion and an increase in joint pain and swelling.  Patient has failed non-operative treatment including NSAIDs, corticosteroid injections, and activity modification.Jennifer Bonilla ambulates independently.  Past Medical History:   Diagnosis Date    Acid reflux     Chronic migraine w/o aura w/o status migrainosus, not intractable 03/21/2017    Interstitial cystitis 9/22/2014    Palpitations 4/1/2014    RBBB 05/09/2014    Pt's Ep study is not c/w WPW or Brugada syndrome.  She has RBBB with short WA interval.    TMJ (temporomandibular joint disorder)     jaw clicking       Past Surgical History:   Procedure Laterality Date    ABDOMINAL SURGERY      APPENDECTOMY      CHOLECYSTECTOMY      CYSTOSCOPY      RADIOFREQUENCY ABLATION         Family History   Problem Relation Age of Onset    Cancer Father     Cancer Mother     No Known Problems Sister     No Known Problems Brother     No Known Problems Maternal Aunt     No Known Problems Maternal Uncle     No Known Problems Paternal Aunt     No Known Problems Paternal Uncle     No Known Problems Maternal Grandmother     No Known Problems Maternal Grandfather     No Known Problems Paternal Grandmother     No Known Problems Paternal Grandfather     Glaucoma Neg Hx     Amblyopia Neg Hx     Blindness Neg Hx     Cataracts Neg Hx     Diabetes Neg Hx     Hypertension Neg Hx     Macular degeneration Neg Hx     Retinal detachment Neg Hx     Strabismus Neg Hx     Stroke Neg Hx     Thyroid disease Neg Hx        Review of patient's allergies indicates:   Allergen Reactions    Codeine Anaphylaxis    Zosyn [piperacillin-tazobactam] Anaphylaxis    Capsicum Other (See Comments)     others         Current Outpatient  "Medications:     verapamil (VERELAN) 120 MG C24P, Take 1 capsule (120 mg total) by mouth once daily., Disp: 90 capsule, Rfl: 3    Review of Systems:  Constitutional: no fever or chills  Eyes: no visual changes  ENT: no nasal congestion or sore throat  Respiratory: no cough or shortness of breath  Cardiovascular: no chest pain or palpitations  Gastrointestinal: no nausea or vomiting, tolerating diet  Genitourinary: no hematuria or dysuria  Integument/Breast: no rash or pruritis  Hematologic/Lymphatic: no easy bruising or lymphadenopathy  Musculoskeletal: c/o left knee pain with catching and locking  Neurological: no seizures or tremors  Behavioral/Psych: no auditory or visual hallucinations  Endocrine: no heat or cold intolerance    PE:  Ht 5' 5" (1.651 m)   Wt 94.2 kg (207 lb 10.8 oz)   BMI 34.56 kg/m²   General: Pleasant, cooperative, NAD   Gait: antalgic  HEENT: NCAT, sclera nonicteric   Lungs: Respirations clear, equal and unlabored.   CV: S1S2; 2+ bilateral upper and lower extremity pulses.   Skin: Intact throughout with no rashes, erythema, or lesions  Extremities: No LE edema,  no erythema or warmth of the skin in either lower extremity.    Left knee exam:  Knee Range of Motion:normal, pain with passive range of motion  Effusion:none  Condition of skin:intact  Location of tenderness:Medial joint line   Strength:normal  Stability: stable to testing  Steve:Positive Medial    Radiographs: Radiographs reveal mild DJD and joint space narrowing bilaterally.  No fracture or dislocation.  No bone destruction identified  MRI: Medial meniscus tear, patellofemoral and medial tibiofemoral cartilage loss, and a moderate-sized Baker's cyst with several septations.    Diagnosis: medial meniscus tear Left knee    Plan: arthroscopy Left knee                  "

## 2018-08-13 NOTE — PROGRESS NOTES
Jennifer Bonilla is a 47 y.o. year old here today for a pre-operative visit in preparation for a Left knee arthroscopy to be performed by  Dr. Sandoval on 8/16/18.  she was last seen and treated in the clinic on 6/11/2018. There has been no significant change in medical status since last visit. No fever, chills, malaise, or unexplained weight change.      Allergies, Medications, past medical and surgical history reviewed.    Focused examination performed.    Pt was fitted with crutches by [facility].    Patient declined to see Dr. Sandoval today in clinic. All questions answered. Patient encouraged to call with questions. Contact information given.     Pre, yvette, and post operative procedures and expectations discussed. Questions were answered. Jennifer Bonilla has been educated and is ready to proceed with surgery. Approximately 30 minutes was spent discussing surgical outcomes, plans, procedures pre, yvette, and post operative expections and care.  Surgical consent signed.    Jennifer Bonilla will contact us if there are any questions, concerns, or changes in medical status prior to surgery.

## 2018-08-13 NOTE — H&P (VIEW-ONLY)
CC: Left knee pain    Jennifer Bonilla is a 47 y.o. female with a history of Left knee pain. Pain is worse with activity and weight bearing.  Patient has experienced interference of activities of daily living due to decreased range of motion and an increase in joint pain and swelling.  Patient has failed non-operative treatment including NSAIDs, corticosteroid injections, and activity modification.Jennifer Bonilla ambulates independently.  Past Medical History:   Diagnosis Date    Acid reflux     Chronic migraine w/o aura w/o status migrainosus, not intractable 03/21/2017    Interstitial cystitis 9/22/2014    Palpitations 4/1/2014    RBBB 05/09/2014    Pt's Ep study is not c/w WPW or Brugada syndrome.  She has RBBB with short ND interval.    TMJ (temporomandibular joint disorder)     jaw clicking       Past Surgical History:   Procedure Laterality Date    ABDOMINAL SURGERY      APPENDECTOMY      CHOLECYSTECTOMY      CYSTOSCOPY      RADIOFREQUENCY ABLATION         Family History   Problem Relation Age of Onset    Cancer Father     Cancer Mother     No Known Problems Sister     No Known Problems Brother     No Known Problems Maternal Aunt     No Known Problems Maternal Uncle     No Known Problems Paternal Aunt     No Known Problems Paternal Uncle     No Known Problems Maternal Grandmother     No Known Problems Maternal Grandfather     No Known Problems Paternal Grandmother     No Known Problems Paternal Grandfather     Glaucoma Neg Hx     Amblyopia Neg Hx     Blindness Neg Hx     Cataracts Neg Hx     Diabetes Neg Hx     Hypertension Neg Hx     Macular degeneration Neg Hx     Retinal detachment Neg Hx     Strabismus Neg Hx     Stroke Neg Hx     Thyroid disease Neg Hx        Review of patient's allergies indicates:   Allergen Reactions    Codeine Anaphylaxis    Zosyn [piperacillin-tazobactam] Anaphylaxis    Capsicum Other (See Comments)     others         Current Outpatient  "Medications:     verapamil (VERELAN) 120 MG C24P, Take 1 capsule (120 mg total) by mouth once daily., Disp: 90 capsule, Rfl: 3    Review of Systems:  Constitutional: no fever or chills  Eyes: no visual changes  ENT: no nasal congestion or sore throat  Respiratory: no cough or shortness of breath  Cardiovascular: no chest pain or palpitations  Gastrointestinal: no nausea or vomiting, tolerating diet  Genitourinary: no hematuria or dysuria  Integument/Breast: no rash or pruritis  Hematologic/Lymphatic: no easy bruising or lymphadenopathy  Musculoskeletal: c/o left knee pain with catching and locking  Neurological: no seizures or tremors  Behavioral/Psych: no auditory or visual hallucinations  Endocrine: no heat or cold intolerance    PE:  Ht 5' 5" (1.651 m)   Wt 94.2 kg (207 lb 10.8 oz)   BMI 34.56 kg/m²   General: Pleasant, cooperative, NAD   Gait: antalgic  HEENT: NCAT, sclera nonicteric   Lungs: Respirations clear, equal and unlabored.   CV: S1S2; 2+ bilateral upper and lower extremity pulses.   Skin: Intact throughout with no rashes, erythema, or lesions  Extremities: No LE edema,  no erythema or warmth of the skin in either lower extremity.    Left knee exam:  Knee Range of Motion:normal, pain with passive range of motion  Effusion:none  Condition of skin:intact  Location of tenderness:Medial joint line   Strength:normal  Stability: stable to testing  Steve:Positive Medial    Radiographs: Radiographs reveal mild DJD and joint space narrowing bilaterally.  No fracture or dislocation.  No bone destruction identified  MRI: Medial meniscus tear, patellofemoral and medial tibiofemoral cartilage loss, and a moderate-sized Baker's cyst with several septations.    Diagnosis: medial meniscus tear Left knee    Plan: arthroscopy Left knee                  "

## 2018-08-15 ENCOUNTER — TELEPHONE (OUTPATIENT)
Dept: ORTHOPEDICS | Facility: CLINIC | Age: 48
End: 2018-08-15

## 2018-08-15 NOTE — TELEPHONE ENCOUNTER
Left detailed message informing patient that the arrival time for her procedure is 0500 at the second floor sx center and no food or drinks after midnight tonight. Also left detailed contact info in case patient has further question.

## 2018-08-15 NOTE — PRE-PROCEDURE INSTRUCTIONS
PreOp Instructions given:     - Verbal medication information (what to hold and what to take)   - NPO guidelines   - Arrival place directions given;  - Bathing with antibacterial soap   - Don't wear any jewelry or bring any valuables AM of surgery   - No makeup or moisturizer to face   - No perfume/cologne, powder, lotions or aftershave   Pt. verbalized understanding.   Denies any  history of side effects or issues with anesthesia or sedation.

## 2018-08-16 ENCOUNTER — ANESTHESIA (OUTPATIENT)
Dept: SURGERY | Facility: HOSPITAL | Age: 48
End: 2018-08-16
Payer: COMMERCIAL

## 2018-08-16 ENCOUNTER — ANESTHESIA EVENT (OUTPATIENT)
Dept: SURGERY | Facility: HOSPITAL | Age: 48
End: 2018-08-16
Payer: COMMERCIAL

## 2018-08-16 ENCOUNTER — HOSPITAL ENCOUNTER (OUTPATIENT)
Facility: HOSPITAL | Age: 48
Discharge: HOME | End: 2018-08-16
Attending: ORTHOPAEDIC SURGERY | Admitting: ORTHOPAEDIC SURGERY
Payer: COMMERCIAL

## 2018-08-16 VITALS
BODY MASS INDEX: 34.49 KG/M2 | TEMPERATURE: 98 F | SYSTOLIC BLOOD PRESSURE: 139 MMHG | RESPIRATION RATE: 16 BRPM | HEIGHT: 65 IN | OXYGEN SATURATION: 100 % | DIASTOLIC BLOOD PRESSURE: 74 MMHG | WEIGHT: 207 LBS | HEART RATE: 67 BPM

## 2018-08-16 DIAGNOSIS — M23.90 INTERNAL DERANGEMENT OF KNEE: ICD-10-CM

## 2018-08-16 DIAGNOSIS — M23.92 INTERNAL DERANGEMENT OF LEFT KNEE: ICD-10-CM

## 2018-08-16 PROCEDURE — 29881 ARTHRS KNE SRG MNISECTMY M/L: CPT | Mod: LT,,, | Performed by: PHYSICIAN ASSISTANT

## 2018-08-16 PROCEDURE — D9220A PRA ANESTHESIA: Mod: ANES,,, | Performed by: ANESTHESIOLOGY

## 2018-08-16 PROCEDURE — 37000009 HC ANESTHESIA EA ADD 15 MINS: Performed by: ORTHOPAEDIC SURGERY

## 2018-08-16 PROCEDURE — D9220A PRA ANESTHESIA: Mod: CRNA,,, | Performed by: NURSE ANESTHETIST, CERTIFIED REGISTERED

## 2018-08-16 PROCEDURE — 71000033 HC RECOVERY, INTIAL HOUR: Performed by: ORTHOPAEDIC SURGERY

## 2018-08-16 PROCEDURE — 25000003 PHARM REV CODE 250: Performed by: ORTHOPAEDIC SURGERY

## 2018-08-16 PROCEDURE — 76942 ECHO GUIDE FOR BIOPSY: CPT | Mod: 26,,, | Performed by: ANESTHESIOLOGY

## 2018-08-16 PROCEDURE — 63600175 PHARM REV CODE 636 W HCPCS: Performed by: NURSE ANESTHETIST, CERTIFIED REGISTERED

## 2018-08-16 PROCEDURE — 71000039 HC RECOVERY, EACH ADD'L HOUR: Performed by: ORTHOPAEDIC SURGERY

## 2018-08-16 PROCEDURE — 64447 NJX AA&/STRD FEMORAL NRV IMG: CPT | Performed by: ANESTHESIOLOGY

## 2018-08-16 PROCEDURE — 27201423 OPTIME MED/SURG SUP & DEVICES STERILE SUPPLY: Performed by: ORTHOPAEDIC SURGERY

## 2018-08-16 PROCEDURE — 37000008 HC ANESTHESIA 1ST 15 MINUTES: Performed by: ORTHOPAEDIC SURGERY

## 2018-08-16 PROCEDURE — 25000003 PHARM REV CODE 250: Performed by: NURSE PRACTITIONER

## 2018-08-16 PROCEDURE — 71000015 HC POSTOP RECOV 1ST HR: Performed by: ORTHOPAEDIC SURGERY

## 2018-08-16 PROCEDURE — 29881 ARTHRS KNE SRG MNISECTMY M/L: CPT | Mod: LT,,, | Performed by: ORTHOPAEDIC SURGERY

## 2018-08-16 PROCEDURE — 63600175 PHARM REV CODE 636 W HCPCS: Performed by: ANESTHESIOLOGY

## 2018-08-16 PROCEDURE — 27000221 HC OXYGEN, UP TO 24 HOURS

## 2018-08-16 PROCEDURE — 63600175 PHARM REV CODE 636 W HCPCS: Performed by: NURSE PRACTITIONER

## 2018-08-16 PROCEDURE — 36000710: Performed by: ORTHOPAEDIC SURGERY

## 2018-08-16 PROCEDURE — 36000711: Performed by: ORTHOPAEDIC SURGERY

## 2018-08-16 PROCEDURE — 63600175 PHARM REV CODE 636 W HCPCS

## 2018-08-16 PROCEDURE — 94761 N-INVAS EAR/PLS OXIMETRY MLT: CPT

## 2018-08-16 RX ORDER — MUPIROCIN 20 MG/G
1 OINTMENT TOPICAL 2 TIMES DAILY
Status: CANCELLED | OUTPATIENT
Start: 2018-08-16 | End: 2018-08-21

## 2018-08-16 RX ORDER — SODIUM CHLORIDE 0.9 % (FLUSH) 0.9 %
5 SYRINGE (ML) INJECTION
Status: DISCONTINUED | OUTPATIENT
Start: 2018-08-16 | End: 2018-08-16 | Stop reason: HOSPADM

## 2018-08-16 RX ORDER — LIDOCAINE HCL/PF 100 MG/5ML
SYRINGE (ML) INTRAVENOUS
Status: DISCONTINUED | OUTPATIENT
Start: 2018-08-16 | End: 2018-08-16

## 2018-08-16 RX ORDER — ACETAMINOPHEN 325 MG/1
650 TABLET ORAL EVERY 4 HOURS PRN
Status: DISCONTINUED | OUTPATIENT
Start: 2018-08-16 | End: 2018-08-16 | Stop reason: HOSPADM

## 2018-08-16 RX ORDER — ONDANSETRON 2 MG/ML
INJECTION INTRAMUSCULAR; INTRAVENOUS
Status: DISCONTINUED | OUTPATIENT
Start: 2018-08-16 | End: 2018-08-16

## 2018-08-16 RX ORDER — ACETAMINOPHEN 10 MG/ML
INJECTION, SOLUTION INTRAVENOUS
Status: DISCONTINUED | OUTPATIENT
Start: 2018-08-16 | End: 2018-08-16

## 2018-08-16 RX ORDER — VANCOMYCIN HCL IN 5 % DEXTROSE 1.5G/250ML
1500 PLASTIC BAG, INJECTION (ML) INTRAVENOUS
Status: DISCONTINUED | OUTPATIENT
Start: 2018-08-16 | End: 2018-08-16

## 2018-08-16 RX ORDER — ROPIVACAINE HYDROCHLORIDE 5 MG/ML
INJECTION, SOLUTION EPIDURAL; INFILTRATION; PERINEURAL
Status: DISCONTINUED | OUTPATIENT
Start: 2018-08-16 | End: 2018-08-16

## 2018-08-16 RX ORDER — DEXAMETHASONE SODIUM PHOSPHATE 4 MG/ML
INJECTION, SOLUTION INTRA-ARTICULAR; INTRALESIONAL; INTRAMUSCULAR; INTRAVENOUS; SOFT TISSUE
Status: DISCONTINUED | OUTPATIENT
Start: 2018-08-16 | End: 2018-08-16

## 2018-08-16 RX ORDER — MEPERIDINE HYDROCHLORIDE 50 MG/ML
INJECTION INTRAMUSCULAR; INTRAVENOUS; SUBCUTANEOUS
Status: COMPLETED
Start: 2018-08-16 | End: 2018-08-16

## 2018-08-16 RX ORDER — ASPIRIN 81 MG/1
81 TABLET ORAL DAILY
Qty: 30 TABLET | Refills: 0 | Status: SHIPPED | OUTPATIENT
Start: 2018-08-16 | End: 2018-12-05

## 2018-08-16 RX ORDER — LIDOCAINE HYDROCHLORIDE 10 MG/ML
1 INJECTION, SOLUTION EPIDURAL; INFILTRATION; INTRACAUDAL; PERINEURAL ONCE
Status: DISCONTINUED | OUTPATIENT
Start: 2018-08-16 | End: 2018-08-16 | Stop reason: HOSPADM

## 2018-08-16 RX ORDER — MEPERIDINE HYDROCHLORIDE 50 MG/ML
6.25 INJECTION INTRAMUSCULAR; INTRAVENOUS; SUBCUTANEOUS
Status: DISCONTINUED | OUTPATIENT
Start: 2018-08-16 | End: 2018-08-16 | Stop reason: HOSPADM

## 2018-08-16 RX ORDER — SODIUM CHLORIDE 9 MG/ML
INJECTION, SOLUTION INTRAVENOUS CONTINUOUS
Status: DISCONTINUED | OUTPATIENT
Start: 2018-08-16 | End: 2018-08-16 | Stop reason: HOSPADM

## 2018-08-16 RX ORDER — MUPIROCIN 20 MG/G
OINTMENT TOPICAL
Status: DISCONTINUED | OUTPATIENT
Start: 2018-08-16 | End: 2018-08-16 | Stop reason: HOSPADM

## 2018-08-16 RX ORDER — BUPIVACAINE HYDROCHLORIDE AND EPINEPHRINE 5; 5 MG/ML; UG/ML
INJECTION, SOLUTION EPIDURAL; INTRACAUDAL; PERINEURAL
Status: DISCONTINUED | OUTPATIENT
Start: 2018-08-16 | End: 2018-08-16 | Stop reason: HOSPADM

## 2018-08-16 RX ORDER — ONDANSETRON 8 MG/1
8 TABLET, ORALLY DISINTEGRATING ORAL EVERY 8 HOURS PRN
Status: DISCONTINUED | OUTPATIENT
Start: 2018-08-16 | End: 2018-08-16 | Stop reason: HOSPADM

## 2018-08-16 RX ORDER — PROPOFOL 10 MG/ML
VIAL (ML) INTRAVENOUS
Status: DISCONTINUED | OUTPATIENT
Start: 2018-08-16 | End: 2018-08-16

## 2018-08-16 RX ORDER — CEFAZOLIN SODIUM 1 G/3ML
2 INJECTION, POWDER, FOR SOLUTION INTRAMUSCULAR; INTRAVENOUS
Status: COMPLETED | OUTPATIENT
Start: 2018-08-16 | End: 2018-08-16

## 2018-08-16 RX ADMIN — MEPERIDINE HYDROCHLORIDE 6.5 MG: 50 INJECTION, SOLUTION INTRAMUSCULAR; INTRAVENOUS; SUBCUTANEOUS at 09:08

## 2018-08-16 RX ADMIN — CEFAZOLIN 2 G: 330 INJECTION, POWDER, FOR SOLUTION INTRAMUSCULAR; INTRAVENOUS at 07:08

## 2018-08-16 RX ADMIN — PROPOFOL 100 MG: 10 INJECTION, EMULSION INTRAVENOUS at 07:08

## 2018-08-16 RX ADMIN — LIDOCAINE HYDROCHLORIDE 100 MG: 20 INJECTION, SOLUTION INTRAVENOUS at 07:08

## 2018-08-16 RX ADMIN — PROPOFOL 50 MG: 10 INJECTION, EMULSION INTRAVENOUS at 07:08

## 2018-08-16 RX ADMIN — ACETAMINOPHEN 1000 MG: 10 INJECTION, SOLUTION INTRAVENOUS at 07:08

## 2018-08-16 RX ADMIN — ONDANSETRON 4 MG: 2 INJECTION INTRAMUSCULAR; INTRAVENOUS at 07:08

## 2018-08-16 RX ADMIN — ROPIVACAINE HYDROCHLORIDE 10 ML: 5 INJECTION, SOLUTION EPIDURAL; INFILTRATION; PERINEURAL at 07:08

## 2018-08-16 RX ADMIN — PROPOFOL 200 MG: 10 INJECTION, EMULSION INTRAVENOUS at 07:08

## 2018-08-16 RX ADMIN — SODIUM CHLORIDE: 0.9 INJECTION, SOLUTION INTRAVENOUS at 06:08

## 2018-08-16 RX ADMIN — DEXAMETHASONE SODIUM PHOSPHATE 8 MG: 4 INJECTION, SOLUTION INTRAMUSCULAR; INTRAVENOUS at 07:08

## 2018-08-16 RX ADMIN — VANCOMYCIN HYDROCHLORIDE 1500 MG: 10 INJECTION, POWDER, LYOPHILIZED, FOR SOLUTION INTRAVENOUS at 06:08

## 2018-08-16 RX ADMIN — MUPIROCIN: 20 OINTMENT TOPICAL at 06:08

## 2018-08-16 RX ADMIN — MEPERIDINE HYDROCHLORIDE 6.5 MG: 50 INJECTION INTRAMUSCULAR; INTRAVENOUS; SUBCUTANEOUS at 09:08

## 2018-08-16 NOTE — PROGRESS NOTES
Patient arrived to PACU at 0807 appearing dusky, not moving air, exhibiting s/s of laryngospasm. CRNA, RT Ronna, and RNs x 2 at bedside. O2 sats dropped to the 60s during event. Airway support and supplemental O2 provided. Spontaneous resolution occurred rapidly with O2 sats recovering to 98% on 10L SFM and coloration back to baseline. Patient awake and breathing spontaneously with no further s/s of respiratory distress. RNs remain at bedside and will continue to closely monitor patient status.

## 2018-08-16 NOTE — PROGRESS NOTES
Patient c/o 10/10 pain to left hip, but reports a previous anaphylactic reaction to Tylenol with Codeine. Patient reports having taken Mepergan Fortis in the past with no adverse effects, but remains apprehensive about taking any narcotic pain medication (PO or IV). Patient also experiencing post-op tremors. Jolly Ortiz (ortho) notified of the above and responded to bedside for patient assessment. Dr. Crespo (staff anesthesia) also notified of the above. TORB taken for 6.25 mg of demerol IV (see orders for details). No additional orders given at this time. Will carry out orders and continue to monitor.

## 2018-08-16 NOTE — ANESTHESIA RELEASE NOTE
Anesthesia Release from PACU Note    Patient: Jennifer Bonilla    Procedure(s) Performed: Procedure(s) (LRB):  ARTHROSCOPY, KNEE (Left)  MENISCECTOMY, KNEE, MEDIAL PARTIAL (Left)    Anesthesia type: Regional     Post pain: Adequate analgesia    Post assessment: no apparent anesthetic complications, tolerated procedure well and no evidence of recall    Last Vitals:   Vitals:    08/16/18 0951   BP: (!) 149/76   Pulse: 65   Resp: 14   Temp: 36.5 °C (97.7 °F)   SpO2: 98%       Post vital signs: stable    Level of consciousness: awake, alert  and oriented    Nausea/Vomiting: no nausea/no vomiting    Complications: none    Airway Patency: patent    Respiratory: unassisted    Cardiovascular: stable and blood pressure at baseline    Hydration: euvolemic

## 2018-08-16 NOTE — ANESTHESIA POSTPROCEDURE EVALUATION
"Anesthesia Post Evaluation    Patient: Jennifer Bonilla    Procedure(s) Performed: Procedure(s) (LRB):  ARTHROSCOPY, KNEE (Left)  MENISCECTOMY, KNEE, MEDIAL PARTIAL (Left)    Final Anesthesia Type: general  Patient location during evaluation: PACU  Patient participation: Yes- Able to Participate  Level of consciousness: awake and alert and oriented  Post-procedure vital signs: reviewed and stable  Pain management: adequate  Airway patency: patent  PONV status at discharge: No PONV  Anesthetic complications: no      Cardiovascular status: blood pressure returned to baseline  Respiratory status: unassisted, spontaneous ventilation and room air  Hydration status: euvolemic  Follow-up not needed.        Visit Vitals  BP (!) 149/76   Pulse 65   Temp 36.5 °C (97.7 °F) (Skin)   Resp 14   Ht 5' 5" (1.651 m)   Wt 93.9 kg (207 lb)   SpO2 98%   Breastfeeding? No   BMI 34.45 kg/m²       Pain/Tequila Score: Pain Assessment Performed: Yes (8/16/2018  9:51 AM)  Presence of Pain: denies (8/16/2018  9:51 AM)  Pain Rating Prior to Med Admin: 10 (8/16/2018  9:15 AM)  Tequila Score: 10 (8/16/2018  9:51 AM)        "

## 2018-08-16 NOTE — PROGRESS NOTES
Pt arrived to phase II missing pacu release. MD Crespo notified and arrived at bedside in DOSC. Orders/ release to be placed.

## 2018-08-16 NOTE — DISCHARGE INSTRUCTIONS
PATIENT INSTRUCTIONS  POST-ANESTHESIA    IMMEDIATELY FOLLOWING SURGERY:  Do not drive or operate machinery for the first twenty four hours after surgery.  Do not make any important decisions for twenty four hours after surgery or while taking narcotic pain medications or sedatives.  If you develop intractable nausea and vomiting or a severe headache please notify your doctor immediately.    FOLLOW-UP:  Please make an appointment with your surgeon as instructed. You do not need to follow up with anesthesia unless specifically instructed to do so.    WOUND CARE INSTRUCTIONS (if applicable):  Keep a dry clean dressing on the anesthesia/puncture wound site if there is drainage.  Once the wound has quit draining you may leave it open to air.  Generally you should leave the bandage intact for twenty four hours unless there is drainage.  If the epidural site drains for more than 36-48 hours please call the anesthesia department.    QUESTIONS?:  Please feel free to call your physician or the hospital  if you have any questions, and they will be happy to assist you.         Recovery After Procedural Sedation (Adult)  You have been given medicine by vein to make you sleep during your surgery. This may have included both a pain medicine and sleeping medicine. Most of the effects have worn off. But you may still have some drowsiness for the next 6 to 8 hours.  Home care  Follow these guidelines when you get home:  · For the next 8 hours, you should be watched by a responsible adult. This person should make sure your condition is not getting worse.  · Don't drink any alcohol for the next 24 hours.  · Don't drive, operate dangerous machinery, or make important business or personal decisions during the next 24 hours.  Note: Your healthcare provider may tell you not to take any medicine by mouth for pain or sleep in the next 4 hours. These medicines may react with the medicines you were given in the hospital. This could  cause a much stronger response than usual.  Follow-up care  Follow up with your healthcare provider if you are not alert and back to your usual level of activity within 12 hours.  When to seek medical advice  Call your healthcare provider right away if any of these occur:  · Drowsiness gets worse  · Weakness or dizziness gets worse  · Repeated vomiting  · You can't be awakened   Date Last Reviewed: 10/18/2016  © 3699-1030 [employer], [employer]. [address] [place] [address], [place] [address]. All rights reserved. This information is not intended as a substitute for professional medical care. Always follow your healthcare professional's instructions.

## 2018-08-16 NOTE — OP NOTE
8/16/18   PREOPERATIVE DIAGNOSIS: Complex medial meniscus tear, Left knee.   POSTOPERATIVE DIAGNOSIS: Complex meniscus tear, Left knee.   PROCEDURES PERFORMED:   1. Left knee arthroscopy.   2. Partial medial meniscectomy.   SURGEON: Kelton Sandoval M.D.   ASSISTANT:Jolly Ortiz PA-C (due to the fact that there was no available   qualified resident, Physician's Assistant Jolly Ortiz acted as first   assistant during this case).   ANESTHESIA: General.   COMPLICATIONS: None.   FLUIDS: 1200 mL  EBL: Minimal  COUNTS: Correct.   DISPOSITION: Recovery Room, stable.   OPERATIVE FINDINGS: Grade 1 -2 Patellofemoral Changes/ Grade 3-4 Articular Cartilage change medial femoral Condyle/ Grade 3 articular surface medial tiba  SPECIMENS: None.   INDICATIONS FOR PROCEDURE: Jennifer Bonilla is a 47-year-old female who is having   symptoms of Left knee pain and had not responded to nonoperative measures.   Physical examination and imaging studies were consistent with a medial meniscus   tear. Treatment options were explained and it was decided to proceed with a   left  knee arthroscopy. She was aware of reasonable treatment options as well   as risks and benefits.   PROCEDURE IN DETAIL: After appropriate consent was obtained, the patient   brought in the Operating Room, anesthesia was administered.She received   antibiotic prophylaxis. The left lower extremity was prepped and draped in the   usual sterile fashion. Tourniquet was not applied or utilized. Anterolateral   arthroscopy portal and anteromedial work portal were established. Diagnostic   arthroscopy was begun. The suprapatellar pouch was free of pathology. There   was no pathology in either medial or lateral gutter. She had grade 1-2 changes of   the patellofemoral joint. Some loose chondral flaps were gently debrided.   Medial compartment was entered. Articular cartilage demonstrated grade 3-4 changes of the medial femoral condyle and grade 3 of the tibia.  A gentle  chondroplasty was performed. There   was a complex tear of the posterior horn of medial meniscus extending from   the posterior horn to junction of the posterior mid third. With a series of   biters and a shaver, this was debrided to a stable rim. Meniscus was probed in   its entirety. No other pathology was noted. There was no pathology   posteriorly. ACL was probed and found to be intact. No pathology about the   PCL. Lateral compartment was entered. There was no pathology. Repeat diagnostic arthroscopy revealed no further pathology. Arthroscopic equipment was removed. Excess fluid was drained through the outflow cannula. This was then removed.   The incisions were closed with nylon suture. Joint was injected with a cocktail   of corticosteroid and local anesthetic. A sterile dressing was applied. She   was awakened, extubated and brought to Recovery Room in stable condition,   tolerated the procedure well, and there were no known complications.

## 2018-08-16 NOTE — ANESTHESIA PREPROCEDURE EVALUATION
08/16/2018  Jennifer Bonilla is a 47 y.o., female.    Anesthesia Evaluation         Review of Systems  Anesthesia Hx:  No problems with previous Anesthesia   Social:  Non-Smoker    Cardiovascular:   Exercise tolerance: good Dysrhythmias    Pulmonary:  Pulmonary Normal    Renal/:  Renal/ Normal     Hepatic/GI:   GERD    Neurological:   Headaches Denies Seizures.    Endocrine:  Endocrine Normal        Physical Exam  General:  Well nourished, Obesity    Airway/Jaw/Neck:  Airway Findings: Mouth Opening: Normal Tongue: Normal  General Airway Assessment: Adult  Mallampati: II  Improves to I with phonation.  TM Distance: Normal, at least 6 cm       Chest/Lungs:  Chest/Lungs Clear    Heart/Vascular:  Heart Findings: Normal            Anesthesia Plan  Type of Anesthesia, risks & benefits discussed:  Anesthesia Type:  general  Patient's Preference:   Intra-op Monitoring Plan: standard ASA monitors  Intra-op Monitoring Plan Comments:   Post Op Pain Control Plan: multimodal analgesia and IV/PO Opioids PRN  Post Op Pain Control Plan Comments: Opioids and analgesic adjuvants as needed  Regional blocks if applicable/indicated  Induction:   IV  Beta Blocker:  Patient is not currently on a Beta-Blocker (No further documentation required).       Informed Consent: Patient understands risks and agrees with Anesthesia plan.  Questions answered. Anesthesia consent signed with patient.  ASA Score: 2     Day of Surgery Review of History & Physical:    H&P update referred to the surgeon.     Anesthesia Plan Notes: I have personally evaluated the patient and discussed risk/benefits/alternatives of general anesthesia.        Ready For Surgery From Anesthesia Perspective.

## 2018-08-16 NOTE — TRANSFER OF CARE
"Anesthesia Transfer of Care Note    Patient: Jennifer Bonilla    Procedure(s) Performed: Procedure(s) (LRB):  ARTHROSCOPY, KNEE (Left)  MENISCECTOMY, KNEE, MEDIAL PARTIAL (Left)    Patient location: PACU    Anesthesia Type: general    Transport from OR: Transported from OR on 6-10 L/min O2 by face mask with adequate spontaneous ventilation    Post pain: adequate analgesia    Post assessment: no apparent anesthetic complications    Post vital signs: stable    Level of consciousness: awake    Nausea/Vomiting: no nausea/vomiting    Complications: none    Transfer of care protocol was followed      Last vitals:   Visit Vitals  /80 (BP Location: Right arm, Patient Position: Lying)   Pulse 62   Temp 36.9 °C (98.4 °F) (Oral)   Resp 18   Ht 5' 5" (1.651 m)   Wt 93.9 kg (207 lb)   SpO2 99%   Breastfeeding? No   BMI 34.45 kg/m²     "

## 2018-08-16 NOTE — PLAN OF CARE
Discharge instructions reviewed with pt and . Understanding verbalized. No complaints of pain reported. Pt able to tolerate Po intake and urinate in restroom. To be transported to car by PCT.

## 2018-08-16 NOTE — ANESTHESIA PROCEDURE NOTES
Adductor Canal Single Injection Block    Patient location during procedure: pre-op   Block not for primary anesthetic.  Reason for block: at surgeon's request and post-op pain management   Post-op Pain Location: left knee  Start time: 8/16/2018 7:23 AM  Timeout: 8/16/2018 7:21 AM   End time: 8/16/2018 7:25 AM  Staffing  Anesthesiologist: Albin Lu MD  Resident/CRNA: Luisito Chavez MD  Performed: resident/CRNA   Preanesthetic Checklist  Completed: patient identified, site marked, surgical consent, pre-op evaluation, timeout performed, IV checked, risks and benefits discussed and monitors and equipment checked  Peripheral Block  Patient position: supine  Prep: ChloraPrep  Patient monitoring: heart rate, cardiac monitor, continuous pulse ox, continuous capnometry and frequent blood pressure checks  Block type: adductor canal  Laterality: left  Injection technique: single shot  Needle  Needle type: Stimuplex   Needle gauge: 21 G  Needle length: 4 in  Needle localization: anatomical landmarks and ultrasound guidance   -ultrasound image captured on disc.  Assessment  Injection assessment: negative aspiration, negative parasthesia and local visualized surrounding nerve  Paresthesia pain: none  Heart rate change: no  Slow fractionated injection: yes  Additional Notes  VSS.  [facility] RN monitoring vitals throughout procedure.  Patient tolerated procedure well. Given 20ml of 0.25% Ropi with Epi.

## 2018-08-16 NOTE — OPERATIVE NOTE ADDENDUM
Certification of Assistant at Surgery       Surgery Date: 8/16/2018     Participating Surgeons:  Surgeon(s) and Role:     * Kelton Sandoval MD - Primary    Procedures:  Procedure(s) (LRB):  ARTHROSCOPY, KNEE (Left)  MENISCECTOMY, KNEE, MEDIAL PARTIAL (Left)    Assistant Surgeon's Certification of Necessity:  I understand that section 1842 (b) (6) (d) of the Social Security Act generally prohibits Medicare Part B reasonable charge payment for the services of assistants at surgery in teaching hospitals when qualified residents are available to furnish such services. I certify that the services for which payment is claimed were medically necessary, and that no qualified resident was available to perform the services. I further understand that these services are subject to post-payment review by the Medicare carrier.      Jolly Ortiz PA-C    08/16/2018  8:12 AM

## 2018-08-21 NOTE — ADDENDUM NOTE
Addendum  created 08/21/18 0855 by Luisito Chavez MD    Diagnosis association updated, Intraprocedure Blocks edited, LDA updated via procedure documentation, Sign clinical note

## 2018-08-23 ENCOUNTER — OFFICE VISIT (OUTPATIENT)
Dept: ORTHOPEDICS | Facility: CLINIC | Age: 48
End: 2018-08-23
Payer: COMMERCIAL

## 2018-08-23 VITALS
SYSTOLIC BLOOD PRESSURE: 121 MMHG | HEART RATE: 67 BPM | HEIGHT: 65 IN | BODY MASS INDEX: 34.49 KG/M2 | WEIGHT: 207 LBS | DIASTOLIC BLOOD PRESSURE: 84 MMHG

## 2018-08-23 DIAGNOSIS — M23.90 INTERNAL DERANGEMENT OF KNEE, UNSPECIFIED LATERALITY: Primary | ICD-10-CM

## 2018-08-23 PROCEDURE — 99024 POSTOP FOLLOW-UP VISIT: CPT | Mod: S$GLB,,, | Performed by: NURSE PRACTITIONER

## 2018-08-23 PROCEDURE — 99999 PR PBB SHADOW E&M-EST. PATIENT-LVL III: CPT | Mod: PBBFAC,,, | Performed by: NURSE PRACTITIONER

## 2018-08-23 NOTE — PROGRESS NOTES
"Jennifer Bonilla is here for post- op visit following a Left knee arthroscopy by Dr. Sandoval on 8/16/18.      Operative findings: Grade 1 -2 Patellofemoral Changes/ Grade 3-4 Articular Cartilage change medial femoral Condyle/ Grade 3 articular surface medial tiba    Exam:  Blood pressure 121/84, pulse 67, height 5' 5" (1.651 m), weight 93.9 kg (207 lb 0.2 oz).   Incisions: well healed without drainage or erythema.   Effusion: small   TTP: Mild   Range of motion: 0-110.      Impression: 1 week s/p left knee arthroscopy.    Plan:   Follow up in 4-5 weeks or sooner as needed.     "

## 2018-08-30 DIAGNOSIS — M23.92 INTERNAL DERANGEMENT OF LEFT KNEE: Primary | ICD-10-CM

## 2018-08-30 NOTE — PROGRESS NOTES
Orders entered for therapy as requested by patient to help with strengthening. She will continue to ice and elevate to help with swelling.

## 2018-08-31 ENCOUNTER — TELEPHONE (OUTPATIENT)
Dept: ORTHOPEDICS | Facility: CLINIC | Age: 48
End: 2018-08-31

## 2018-08-31 NOTE — TELEPHONE ENCOUNTER
Spoke with Tierney bar at [facility] and she stated that they had not yet received the orders and asked that we send them again. I stated that we would within the next couple of hours.

## 2018-08-31 NOTE — TELEPHONE ENCOUNTER
----- Message from Esther Puentes sent at 8/31/2018  2:23 PM CDT -----  Contact: [facility]/  [phone]  [facility] physical therapy would like a call back to ask some questions about a referral. Fax number [phone]

## 2018-09-10 ENCOUNTER — TELEPHONE (OUTPATIENT)
Dept: ORTHOPEDICS | Facility: CLINIC | Age: 48
End: 2018-09-10

## 2018-09-10 NOTE — TELEPHONE ENCOUNTER
Spoke with  and the pt stated that she was calling because she's on her 3rd PT visit, and she still feels as if she's not ready to return to work and would like to know how to go about her disability paperwork. I explained to  that she would need to contact Disabililty to get any paperwork she may need and have it sent over to .  verbalized understanding.

## 2018-09-10 NOTE — TELEPHONE ENCOUNTER
----- Message from Esther Puentes sent at 9/10/2018  9:45 AM CDT -----  Contact: Self/ [phone]  Patient would like a call back to ask some questions about her physical therapy.

## 2018-09-25 DIAGNOSIS — M25.562 LEFT KNEE PAIN, UNSPECIFIED CHRONICITY: Primary | ICD-10-CM

## 2018-09-26 ENCOUNTER — OFFICE VISIT (OUTPATIENT)
Dept: ORTHOPEDICS | Facility: CLINIC | Age: 48
End: 2018-09-26
Payer: COMMERCIAL

## 2018-09-26 ENCOUNTER — HOSPITAL ENCOUNTER (OUTPATIENT)
Dept: RADIOLOGY | Facility: HOSPITAL | Age: 48
Discharge: HOME | End: 2018-09-26
Attending: ORTHOPAEDIC SURGERY
Payer: COMMERCIAL

## 2018-09-26 VITALS — WEIGHT: 207 LBS | BODY MASS INDEX: 34.49 KG/M2 | HEIGHT: 65 IN

## 2018-09-26 DIAGNOSIS — Z98.890 S/P ARTHROSCOPY OF LEFT KNEE: Primary | ICD-10-CM

## 2018-09-26 DIAGNOSIS — M25.562 LEFT KNEE PAIN, UNSPECIFIED CHRONICITY: ICD-10-CM

## 2018-09-26 PROCEDURE — 73560 X-RAY EXAM OF KNEE 1 OR 2: CPT | Mod: TC,RT

## 2018-09-26 PROCEDURE — 99024 POSTOP FOLLOW-UP VISIT: CPT | Mod: S$GLB,,, | Performed by: ORTHOPAEDIC SURGERY

## 2018-09-26 PROCEDURE — 99999 PR PBB SHADOW E&M-EST. PATIENT-LVL II: CPT | Mod: PBBFAC,,, | Performed by: ORTHOPAEDIC SURGERY

## 2018-09-26 PROCEDURE — 73562 X-RAY EXAM OF KNEE 3: CPT | Mod: 26,LT,, | Performed by: RADIOLOGY

## 2018-09-26 PROCEDURE — 73560 X-RAY EXAM OF KNEE 1 OR 2: CPT | Mod: 26,XS,RT, | Performed by: RADIOLOGY

## 2018-09-26 NOTE — PROGRESS NOTES
Jennifer Bonillais in theirsecondpost- op visit following a Left knee arthroscopy.  There is moderate discomfort.  The surgery was 6 weeeks  weeks ago. She is in PT    Exam shows the incisions to be well healed.  There is small effusion. There is Mild tenderness along the medial joint line. Range of motion is 0-120.  She has a 10 degree leg.  There is no evidence of DVT.  The extremity is neurovascularly intact. Quadriceps tone isfair.  Radiographs taken today and reviewed by me demonstrate moderate arthritic change of the left KNEE(s).There  is not bone destruction.  There is not a fracture. The medial compartment is most involved.  No change from prior.  Her arthroscopic pictures are not in the chart  Impression:Continued discomfort and stiffness following left knee arthroscopy.    Plan: At this point we need to continue physical therapy. She is progressing very slowly. We will see Jennifer Bonilla back in 4 weeks to ensure that her motion and strength are improving with PT.  Will attempt to obtain arthroscopic pictures. Will review therapy note.

## 2018-10-31 ENCOUNTER — OFFICE VISIT (OUTPATIENT)
Dept: ORTHOPEDICS | Facility: CLINIC | Age: 48
End: 2018-10-31
Payer: COMMERCIAL

## 2018-10-31 VITALS — HEIGHT: 65 IN | WEIGHT: 216.5 LBS | BODY MASS INDEX: 36.07 KG/M2

## 2018-10-31 DIAGNOSIS — Z98.890 S/P ARTHROSCOPY OF LEFT KNEE: Primary | ICD-10-CM

## 2018-10-31 PROCEDURE — 99999 PR PBB SHADOW E&M-EST. PATIENT-LVL III: CPT | Mod: PBBFAC,,, | Performed by: ORTHOPAEDIC SURGERY

## 2018-10-31 PROCEDURE — 99024 POSTOP FOLLOW-UP VISIT: CPT | Mod: S$GLB,,, | Performed by: ORTHOPAEDIC SURGERY

## 2018-10-31 NOTE — PROGRESS NOTES
"Subjective:      Patient ID: Jennifer Boinlla is a 48 y.o. female.    Chief Complaint: Post-op Evaluation of the Left Knee    HPI  Jennifer Bonilla has left knee pain.  The pain has improved. She is in PT and making progress.  Still has an issue with steps. Almost ready to return to work  Review of Systems   Constitution: Negative for chills, fever and night sweats.   HENT: Negative for hearing loss.    Eyes: Negative for blurred vision and double vision.   Cardiovascular: Negative for chest pain, claudication and leg swelling.   Respiratory: Negative for shortness of breath.    Endocrine: Negative for polydipsia, polyphagia and polyuria.   Hematologic/Lymphatic: Negative for adenopathy and bleeding problem. Does not bruise/bleed easily.   Skin: Negative for poor wound healing.   Musculoskeletal: Positive for joint pain.   Gastrointestinal: Negative for diarrhea and heartburn.   Genitourinary: Negative for bladder incontinence.   Neurological: Negative for focal weakness, headaches, numbness, paresthesias and sensory change.   Psychiatric/Behavioral: The patient is not nervous/anxious.    Allergic/Immunologic: Negative for persistent infections.         Objective:      Body mass index is 36.03 kg/m².  Vitals:    10/31/18 0937   Weight: 98.2 kg (216 lb 7.9 oz)   Height: 5' 5" (1.651 m)           General    Constitutional: She is oriented to person, place, and time. She appears well-developed and well-nourished.   HENT:   Head: Normocephalic and atraumatic.   Eyes: EOM are normal.   Cardiovascular: Normal rate.    Pulmonary/Chest: Effort normal.   Neurological: She is alert and oriented to person, place, and time.   Psychiatric: She has a normal mood and affect. Her behavior is normal.     General Musculoskeletal Exam   Gait: normal       Right Knee Exam     Inspection   Erythema: absent  Scars: absent  Swelling: absent  Effusion: absent  Deformity: absent  Bruising: absent    Tenderness   The patient is experiencing no " tenderness.     Range of Motion   Extension: 0   Flexion: 130     Tests   Ligament Examination Lachman: normal (-1 to 2mm)   MCL - Valgus: normal (0 to 2mm)  LCL - Varus: normal  Patella   Passive Patellar Tilt: neutral    Other   Sensation: normal    Left Knee Exam     Inspection   Erythema: absent  Scars: present  Swelling: absent  Effusion: absent  Deformity: absent  Bruising: absent    Tenderness   The patient is experiencing no tenderness.     Range of Motion   Extension: 0   Flexion: 130     Tests   Stability Lachman: normal (-1 to 2mm)   MCL - Valgus: normal (0 to 2mm)  LCL - Varus: normal (0 to 2mm)  Patella   Passive Patellar Tilt: neutral    Other   Sensation: normal    Muscle Strength   Right Lower Extremity   Hip Abduction: 5/5   Quadriceps:  5/5   Hamstrin/5   Left Lower Extremity   Hip Abduction: 5/5   Quadriceps:  5/5 and 4/5   Hamstrin/5     Reflexes     Left Side  Quadriceps:  2+    Right Side   Quadriceps:  2+    Vascular Exam     Right Pulses  Dorsalis Pedis:      2+          Left Pulses  Dorsalis Pedis:      2+          Edema  Right Lower Leg: absent  Left Lower Leg: absent              Assessment:       Encounter Diagnosis   Name Primary?    S/P arthroscopy of left knee Yes          Plan:       Jennifer was seen today for post-op evaluation.    Diagnoses and all orders for this visit:    S/P arthroscopy of left knee        Progressing much better now.  Should be able to return to work in two weeks.  F/U with me in 4 weeks.

## 2018-12-05 ENCOUNTER — OFFICE VISIT (OUTPATIENT)
Dept: ORTHOPEDICS | Facility: CLINIC | Age: 48
End: 2018-12-05
Payer: COMMERCIAL

## 2018-12-05 VITALS — HEIGHT: 65 IN | BODY MASS INDEX: 35.86 KG/M2 | WEIGHT: 215.25 LBS

## 2018-12-05 DIAGNOSIS — Z98.890 S/P ARTHROSCOPY OF LEFT KNEE: Primary | ICD-10-CM

## 2018-12-05 DIAGNOSIS — M25.562 CHRONIC PAIN OF LEFT KNEE: ICD-10-CM

## 2018-12-05 DIAGNOSIS — G89.29 CHRONIC PAIN OF LEFT KNEE: ICD-10-CM

## 2018-12-05 PROCEDURE — 99999 PR PBB SHADOW E&M-EST. PATIENT-LVL II: CPT | Mod: PBBFAC,,, | Performed by: ORTHOPAEDIC SURGERY

## 2018-12-05 PROCEDURE — 3008F BODY MASS INDEX DOCD: CPT | Mod: CPTII,S$GLB,, | Performed by: ORTHOPAEDIC SURGERY

## 2018-12-05 PROCEDURE — 99213 OFFICE O/P EST LOW 20 MIN: CPT | Mod: S$GLB,,, | Performed by: ORTHOPAEDIC SURGERY

## 2018-12-05 NOTE — PROGRESS NOTES
"Subjective:      Patient ID: Jennifer Bonilla is a 48 y.o. female.    Chief Complaint: Pain of the Left Knee    HPI  Jennifer Bonilla has left knee pain.  The pain has improved. The pain is located in the medial and anterior aspect of the knee.  There  is not radiation.  There is not associated stiffness.   There is not catching and locking. The pain is described as achy. The pain is aggravated by sstairs.  It is alleviated by rest.  There is not associated back pain. She is back at work  Her history, medications and problem list were reviewed.    Review of Systems   Constitution: Negative for chills, fever and night sweats.   HENT: Negative for hearing loss.    Eyes: Negative for blurred vision and double vision.   Cardiovascular: Negative for chest pain, claudication and leg swelling.   Respiratory: Negative for shortness of breath.    Endocrine: Negative for polydipsia, polyphagia and polyuria.   Hematologic/Lymphatic: Negative for adenopathy and bleeding problem. Does not bruise/bleed easily.   Skin: Negative for poor wound healing.   Musculoskeletal: Positive for joint pain.   Gastrointestinal: Negative for diarrhea and heartburn.   Genitourinary: Negative for bladder incontinence.   Neurological: Negative for focal weakness, headaches, numbness, paresthesias and sensory change.   Psychiatric/Behavioral: The patient is not nervous/anxious.    Allergic/Immunologic: Negative for persistent infections.         Objective:      Body mass index is 35.82 kg/m².  Vitals:    12/05/18 1553   Weight: 97.7 kg (215 lb 4.5 oz)   Height: 5' 5" (1.651 m)           General    Constitutional: She is oriented to person, place, and time. She appears well-developed and well-nourished.   HENT:   Head: Normocephalic and atraumatic.   Eyes: EOM are normal.   Cardiovascular: Normal rate.    Pulmonary/Chest: Effort normal.   Neurological: She is alert and oriented to person, place, and time.   Psychiatric: She has a normal " mood and affect. Her behavior is normal.           Right Knee Exam     Inspection   Erythema: absent  Scars: absent  Swelling: absent  Effusion: absent  Deformity: absent  Bruising: absent    Tenderness   The patient is experiencing no tenderness.     Range of Motion   Extension: 0   Flexion: 130     Tests   Ligament Examination Lachman: normal (-1 to 2mm)   MCL - Valgus: normal (0 to 2mm)  LCL - Varus: normal  Patella   Passive Patellar Tilt: neutral    Other   Sensation: normal    Left Knee Exam     Inspection   Erythema: absent  Scars: present  Swelling: absent  Effusion: absent  Deformity: absent  Bruising: absent    Tenderness   The patient is experiencing no tenderness.     Range of Motion   Extension: 0   Flexion: 130     Tests   Stability Lachman: normal (-1 to 2mm)   MCL - Valgus: normal (0 to 2mm)  LCL - Varus: normal (0 to 2mm)  Patella   Passive Patellar Tilt: neutral    Other   Sensation: normal    Right Hip Exam     Range of Motion   Adduction: 20   Left Hip Exam     Range of Motion   Adduction: 20           Muscle Strength   Right Lower Extremity   Quadriceps:  5/5   Hamstrin/5   Left Lower Extremity   Quadriceps:  5/5   Hamstrin/5     Vascular Exam       Edema  Right Upper Leg: absent  Right Lower Leg: absent  Left Upper Leg: absent  Left Lower Leg: absent              Assessment:       Encounter Diagnoses   Name Primary?    S/P arthroscopy of left knee Yes    Chronic pain of left knee           Plan:       Jennifer was seen today for pain.    Diagnoses and all orders for this visit:    S/P arthroscopy of left knee    Chronic pain of left knee      Doing much better.  Continue low impact exercise.  F/U in 2 months with xrays.  Sooner if needed

## 2019-01-15 ENCOUNTER — HOSPITAL ENCOUNTER (OUTPATIENT)
Facility: OTHER | Age: 49
Discharge: HOME | End: 2019-01-15
Attending: INTERNAL MEDICINE | Admitting: INTERNAL MEDICINE
Payer: COMMERCIAL

## 2019-01-15 ENCOUNTER — ANESTHESIA (OUTPATIENT)
Dept: ENDOSCOPY | Facility: OTHER | Age: 49
End: 2019-01-15
Payer: COMMERCIAL

## 2019-01-15 ENCOUNTER — ANESTHESIA EVENT (OUTPATIENT)
Dept: ENDOSCOPY | Facility: OTHER | Age: 49
End: 2019-01-15
Payer: COMMERCIAL

## 2019-01-15 VITALS
WEIGHT: 210 LBS | DIASTOLIC BLOOD PRESSURE: 65 MMHG | OXYGEN SATURATION: 99 % | TEMPERATURE: 98 F | HEART RATE: 59 BPM | BODY MASS INDEX: 34.99 KG/M2 | HEIGHT: 65 IN | RESPIRATION RATE: 18 BRPM | SYSTOLIC BLOOD PRESSURE: 126 MMHG

## 2019-01-15 DIAGNOSIS — K21.9 GERD (GASTROESOPHAGEAL REFLUX DISEASE): ICD-10-CM

## 2019-01-15 LAB
B-HCG UR QL: NEGATIVE
CTP QC/QA: YES

## 2019-01-15 PROCEDURE — 37000008 HC ANESTHESIA 1ST 15 MINUTES: Performed by: INTERNAL MEDICINE

## 2019-01-15 PROCEDURE — 45380 COLONOSCOPY AND BIOPSY: CPT | Performed by: INTERNAL MEDICINE

## 2019-01-15 PROCEDURE — 43239 EGD BIOPSY SINGLE/MULTIPLE: CPT | Performed by: INTERNAL MEDICINE

## 2019-01-15 PROCEDURE — 37000009 HC ANESTHESIA EA ADD 15 MINS: Performed by: INTERNAL MEDICINE

## 2019-01-15 PROCEDURE — 25000003 PHARM REV CODE 250: Performed by: INTERNAL MEDICINE

## 2019-01-15 PROCEDURE — 88305 TISSUE EXAM BY PATHOLOGIST: CPT | Performed by: PATHOLOGY

## 2019-01-15 PROCEDURE — 81025 URINE PREGNANCY TEST: CPT | Performed by: INTERNAL MEDICINE

## 2019-01-15 PROCEDURE — 88305 TISSUE EXAM BY PATHOLOGIST: CPT | Mod: 26,,, | Performed by: PATHOLOGY

## 2019-01-15 PROCEDURE — 63600175 PHARM REV CODE 636 W HCPCS: Performed by: NURSE ANESTHETIST, CERTIFIED REGISTERED

## 2019-01-15 PROCEDURE — 88305 TISSUE SPECIMEN TO PATHOLOGY - SURGERY: ICD-10-PCS | Mod: 26,,, | Performed by: PATHOLOGY

## 2019-01-15 RX ORDER — SODIUM CHLORIDE, SODIUM LACTATE, POTASSIUM CHLORIDE, CALCIUM CHLORIDE 600; 310; 30; 20 MG/100ML; MG/100ML; MG/100ML; MG/100ML
INJECTION, SOLUTION INTRAVENOUS CONTINUOUS
Status: DISCONTINUED | OUTPATIENT
Start: 2019-01-15 | End: 2019-01-15

## 2019-01-15 RX ORDER — PROPOFOL 10 MG/ML
VIAL (ML) INTRAVENOUS
Status: DISCONTINUED | OUTPATIENT
Start: 2019-01-15 | End: 2019-01-15

## 2019-01-15 RX ORDER — MEPERIDINE HYDROCHLORIDE 50 MG/ML
12.5 INJECTION INTRAMUSCULAR; INTRAVENOUS; SUBCUTANEOUS ONCE AS NEEDED
Status: ACTIVE | OUTPATIENT
Start: 2019-01-15 | End: 2019-01-16

## 2019-01-15 RX ORDER — FENTANYL CITRATE 50 UG/ML
25 INJECTION, SOLUTION INTRAMUSCULAR; INTRAVENOUS EVERY 5 MIN PRN
Status: DISCONTINUED | OUTPATIENT
Start: 2019-01-15 | End: 2019-01-16 | Stop reason: HOSPADM

## 2019-01-15 RX ORDER — SODIUM CHLORIDE 0.9 % (FLUSH) 0.9 %
3 SYRINGE (ML) INJECTION
Status: DISCONTINUED | OUTPATIENT
Start: 2019-01-15 | End: 2019-01-16 | Stop reason: HOSPADM

## 2019-01-15 RX ORDER — LIDOCAINE HCL/PF 100 MG/5ML
SYRINGE (ML) INTRAVENOUS
Status: DISCONTINUED | OUTPATIENT
Start: 2019-01-15 | End: 2019-01-15

## 2019-01-15 RX ORDER — OXYCODONE HYDROCHLORIDE 5 MG/1
5 TABLET ORAL
Status: DISCONTINUED | OUTPATIENT
Start: 2019-01-15 | End: 2019-01-16 | Stop reason: HOSPADM

## 2019-01-15 RX ORDER — ONDANSETRON 2 MG/ML
4 INJECTION INTRAMUSCULAR; INTRAVENOUS DAILY PRN
Status: DISCONTINUED | OUTPATIENT
Start: 2019-01-15 | End: 2019-01-16 | Stop reason: HOSPADM

## 2019-01-15 RX ADMIN — SODIUM CHLORIDE, SODIUM LACTATE, POTASSIUM CHLORIDE, AND CALCIUM CHLORIDE: 600; 310; 30; 20 INJECTION, SOLUTION INTRAVENOUS at 07:01

## 2019-01-15 RX ADMIN — PROPOFOL 30 MG: 10 INJECTION, EMULSION INTRAVENOUS at 07:01

## 2019-01-15 RX ADMIN — PROPOFOL 100 MG: 10 INJECTION, EMULSION INTRAVENOUS at 07:01

## 2019-01-15 RX ADMIN — PROPOFOL 75 MG: 10 INJECTION, EMULSION INTRAVENOUS at 07:01

## 2019-01-15 RX ADMIN — LIDOCAINE HYDROCHLORIDE 100 MG: 20 INJECTION, SOLUTION INTRAVENOUS at 07:01

## 2019-01-15 NOTE — [employer] PATIENT INSTRUCTIONS
Discharge Summary/Instructions after an Endoscopic Procedure  Patient Name: Jennifer Bonilla  Patient MRN: [id number]  Patient YOB: 1970  Tuesday, January 15, 2019  Pamela Soriano MD  RESTRICTIONS:  During your procedure today, you received medications for sedation.  These   medications may affect your judgment, balance and coordination.  Therefore,   for 24 hours, you have the following restrictions:   - DO NOT drive a car, operate machinery, make legal/financial decisions,   sign important papers or drink alcohol.    ACTIVITY:  Today: no heavy lifting, straining or running due to procedural   sedation/anesthesia.  The following day: return to full activity including work.  DIET:  Eat and drink normally unless instructed otherwise.     TREATMENT FOR COMMON SIDE EFFECTS:  - Mild abdominal pain, nausea, belching, bloating or excessive gas:  rest,   eat lightly and use a heating pad.  - Sore Throat: treat with throat lozenges and/or gargle with warm salt   water.  - Because air was used during the procedure, expelling large amounts of air   from your rectum or belching is normal.  - If a bowel prep was taken, you may not have a bowel movement for 1-3 days.    This is normal.  SYMPTOMS TO WATCH FOR AND REPORT TO YOUR PHYSICIAN:  1. Abdominal pain or bloating, other than gas cramps.  2. Chest pain.  3. Back pain.  4. Signs of infection such as: chills or fever occurring within 24 hours   after the procedure.  5. Rectal bleeding, which would show as bright red, maroon, or black stools.   (A tablespoon of blood from the rectum is not serious, especially if   hemorrhoids are present.)  6. Vomiting.  7. Weakness or dizziness.  GO DIRECTLY TO THE NEAREST EMERGENCY ROOM IF YOU HAVE ANY OF THE FOLLOWING:      Difficulty breathing              Chills and/or fever over 101 F   Persistent vomiting and/or vomiting blood   Severe abdominal pain   Severe chest pain   Black, tarry stools   Bleeding- more than one  tablespoon   Any other symptom or condition that you feel may need urgent attention  Your doctor recommends these additional instructions:  If any biopsies were taken, your doctors clinic will contact you in 1 to 2   weeks with any results.  - Repeat colonoscopy in 5 years for surveillance.   - Return to GI office PRN.   - Await pathology results.   - Discharge patient to home.  For questions, problems or results please call your physician - Pamela Soriano MD at Work:  [phone].  [facility], EMERGENCY ROOM PHONE NUMBER: [phone], [facility]   [phone].  IF A COMPLICATION OR EMERGENCY SITUATION ARISES AND YOU ARE UNABLE TO REACH   YOUR PHYSICIAN - GO DIRECTLY TO THE EMERGENCY ROOM.  MD Pamela Hicks MD  1/15/2019 8:08:50 AM  This report has been verified and signed electronically.  [employer]

## 2019-01-15 NOTE — H&P
HPI:  Pt is a 48 y.o. female long h/o GERD and fam h/o crc here for EGD and colonoscopy. She has no new complaints.    Past Medical History:   Diagnosis Date    Acid reflux     Chronic migraine w/o aura w/o status migrainosus, not intractable 03/21/2017    Interstitial cystitis 9/22/2014    Palpitations 4/1/2014    RBBB 05/09/2014    Pt's Ep study is not c/w WPW or Brugada syndrome.  She has RBBB with short AL interval.    TMJ (temporomandibular joint disorder)     jaw clicking       Past Surgical History:   Procedure Laterality Date    ABDOMINAL SURGERY      ABLATION N/A 5/9/2014    Per[facility] by [facility] at [facility] CATH LAB    ABLATION, ENDOMETRIUM, THERMAL N/A 8/19/2013    Performed by [facility]son [facility] at [facility] OR    APPENDECTOMY      ARTHROSCOPY, KNEE Left 8/16/2018    Performed by Kelton [facility]oval [facility] at [facility] OR 2ND FLR    CHOLECYSTECTOMY      CYSTOSCOPY      CYSTOSCOPY WITH HYDRODISTENSION N/A 1/31/2017    Performed by Enid Lester MD at [facility] OR 1ST FLR    HYSTEROSCOPY, WITH DILATION AND CURETTAGE OF UTERUS N/A 8/19/2013    Performed by [facility] at [facility] OR    MENISCECTOMY, KNEE, MEDIAL PARTIAL Left 8/16/2018    Performed by [facility] at [facility] OR 2ND FLR    RADIOFREQUENCY ABLATION         Family History   Problem Relation Age of Onset    Cancer Father     Cancer Mother     No Known Problems Sister     No Known Problems Brother     No Known Problems Maternal Aunt     No Known Problems Maternal Uncle     No Known Problems Paternal Aunt     No Known Problems Paternal Uncle     No Known Problems Maternal Grandmother     No Known Problems Maternal Grandfather     No Known Problems Paternal Grandmother     No Known Problems Paternal Grandfather     Glaucoma Neg Hx     Amblyopia Neg Hx     Blindness Neg Hx     Cataracts Neg Hx     Diabetes Neg Hx     Hypertension Neg Hx     Macular degeneration Neg Hx     Retinal detachment Neg Hx      Strabismus Neg Hx     Stroke Neg Hx     Thyroid disease Neg Hx        Social History     Socioeconomic History    Marital status:      Spouse name: Not on file    Number of children: Not on file    Years of education: Not on file    Highest education level: Not on file   Social Needs    Financial resource strain: Not on file    Food insecurity - worry: Not on file    Food insecurity - inability: Not on file    Transportation needs - medical: Not on file    Transportation needs - non-medical: Not on file   Occupational History     Employer: [employer]   Tobacco Use    Smoking status: Never Smoker    Smokeless tobacco: Never Used   Substance and Sexual Activity    Alcohol use: Yes     Alcohol/week: 1.5 oz     Types: 3 Standard drinks or equivalent per week     Comment: socially    Drug use: No    Sexual activity: Yes     Partners: Male   Other Topics Concern    Not on file   Social History Narrative    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            Review of patient's allergies indicates:   Allergen Reactions    Capsicum Blisters     Topical - caused blisters    Codeine Anaphylaxis    Zosyn [piperacillin-tazobactam] Anaphylaxis       No current facility-administered medications on file prior to encounter.      Current Outpatient Medications on File Prior to Encounter   Medication Sig Dispense Refill    verapamil (VERELAN) 120 MG C24P Take 1 capsule (120 mg total) by mouth once daily. 90 capsule 3     Scheduled Meds:  Continuous Infusions:   lactated ringers       PRN Meds:.sodium chloride 0.9%, sodium chloride 0.9%    Review of Systems:  CONSTITUTIONAL: Negative for weakness, fever, weight loss, weight gain.  HEENT: Eyes: Negative for double/blurred vision. Ears: Negative pain or loss of hearing. Nose:Negative for nasal congestion. Negative for rhinorrhea Mouth: Negative for dry mouth/pain Throat: Negative for masses or hoarseness.  CARDIOVASCULAR: Negative for chest pain or  "palpitations.  RESPIRATORY: Negative for SOB, wheezes  GASTROINTESTINAL: See HPI  GENITOURINARY: Negative for dysuria/hematuria.  MUSCULOSKELETAL: Negative for osteoarthritis, muscle pain.  SKIN: Negative for rashes/lesions.  NEUROLOGIC: Negative for headaches, numbness/tingling.  ENDOCRINE: Negative for diabetes or thyroid abnormalities.  HEMATOLOGIC: Negative for anemia or blood dyscrasias.    Patient Vitals for the past 24 hrs:   BP Temp Temp src Pulse Resp SpO2 Height Weight   01/15/19 0633 (!) 150/76 98.1 °F (36.7 °C) Oral 61 16 99 % -- --   01/15/19 0617 -- -- -- -- -- -- 5' 5" (1.651 m) 95.3 kg (210 lb)       Gen: Well developed, well nourished, no apparent distress, cooperative  HEENT: Anicteric, PERRLA, normocephalic, neck symmetrical  CV: S1, S2, no murmers/rubs, non-displaced PMI  Lungs: CTA-B, normal excursion  Abd: Soft, NT, ND, normal BS's, no HSM  Ext: No c/c/e, 1+ DP pulses to BLE's  Neuro: CN II-XII grossly intact, no asterixis.  Skin: No rashes/lesions, normal texture  Psych: AA&O x 4, normal affect    Labs:  No results for input(s): GLUCOSE, CALCIUM, PROT, NA, K, CO2, CL, BUN, CREATININE, ALKPHOS, ALT, AST, BILITOT in the last 24 hours.    Invalid input(s):  ALBUMIN  No results found for this or any previous visit (from the past 336 hour(s)).  No results for input(s): PT, INR, APTT in the last 24 hours.        Assessment:  Pt. Is a 48 y.o. female with GERD and family h/o crc    Recommendations:  EGD/ colonoscopy    "

## 2019-01-15 NOTE — ANESTHESIA PREPROCEDURE EVALUATION
01/15/2019  Jennifer Bonilla is a 48 y.o., female.    Anesthesia Evaluation    I have reviewed the Patient Summary Reports.    I have reviewed the Nursing Notes.   I have reviewed the Medications.     Review of Systems  Cardiovascular:   Dysrhythmias R BBB   Renal/:   Interstitial cystitis   Hepatic/GI:   GERD, poorly controlled    Neurological:   Headaches migraines       Physical Exam  General:  Well nourished, Obesity    Airway/Jaw/Neck:  Airway Findings: Mouth Opening: Normal Tongue: Normal  General Airway Assessment: Adult  Mallampati: I  TM Distance: Normal, at least 6 cm  Jaw/Neck Findings:  Neck ROM: Normal ROM      Dental:  Dental Findings: lower retainer             Anesthesia Plan  Type of Anesthesia, risks & benefits discussed:  Anesthesia Type:  general  Patient's Preference:   Intra-op Monitoring Plan:   Intra-op Monitoring Plan Comments:   Post Op Pain Control Plan:   Post Op Pain Control Plan Comments:   Induction:   IV  Beta Blocker:         Informed Consent: Patient understands risks and agrees with Anesthesia plan.  Questions answered. Anesthesia consent signed with patient.  ASA Score: 2     Day of Surgery Review of History & Physical:    H&P update referred to the surgeon.         Ready For Surgery From Anesthesia Perspective.

## 2019-01-15 NOTE — DISCHARGE SUMMARY
Pt s./p EGD with bx and colonoscopy with polypectomy. She tolerated the procedures well. Please se Provation for details.  Rec:  1) follow up bx  2) repeat colonoscopy in 5 years

## 2019-01-15 NOTE — PROVATION PATIENT INSTRUCTIONS
Discharge Summary/Instructions after an Endoscopic Procedure  Patient Name: Jennifer Bonilla  Patient MRN: [id number]  Patient YOB: 1970  Tuesday, January 15, 2019  Pamela Soriano MD  RESTRICTIONS:  During your procedure today, you received medications for sedation.  These   medications may affect your judgment, balance and coordination.  Therefore,   for 24 hours, you have the following restrictions:   - DO NOT drive a car, operate machinery, make legal/financial decisions,   sign important papers or drink alcohol.    ACTIVITY:  Today: no heavy lifting, straining or running due to procedural   sedation/anesthesia.  The following day: return to full activity including work.  DIET:  Eat and drink normally unless instructed otherwise.     TREATMENT FOR COMMON SIDE EFFECTS:  - Mild abdominal pain, nausea, belching, bloating or excessive gas:  rest,   eat lightly and use a heating pad.  - Sore Throat: treat with throat lozenges and/or gargle with warm salt   water.  - Because air was used during the procedure, expelling large amounts of air   from your rectum or belching is normal.  - If a bowel prep was taken, you may not have a bowel movement for 1-3 days.    This is normal.  SYMPTOMS TO WATCH FOR AND REPORT TO YOUR PHYSICIAN:  1. Abdominal pain or bloating, other than gas cramps.  2. Chest pain.  3. Back pain.  4. Signs of infection such as: chills or fever occurring within 24 hours   after the procedure.  5. Rectal bleeding, which would show as bright red, maroon, or black stools.   (A tablespoon of blood from the rectum is not serious, especially if   hemorrhoids are present.)  6. Vomiting.  7. Weakness or dizziness.  GO DIRECTLY TO THE NEAREST EMERGENCY ROOM IF YOU HAVE ANY OF THE FOLLOWING:      Difficulty breathing              Chills and/or fever over 101 F   Persistent vomiting and/or vomiting blood   Severe abdominal pain   Severe chest pain   Black, tarry stools   Bleeding- more than one  tablespoon   Any other symptom or condition that you feel may need urgent attention  Your doctor recommends these additional instructions:  If any biopsies were taken, your doctors clinic will contact you in 1 to 2   weeks with any results.  - Discharge patient to home.   - Await pathology results.   - Continue present medications.  For questions, problems or results please call your physician - Pamela Soriano MD at Work:  [phone].  [facility], EMERGENCY ROOM PHONE NUMBER: [phone], [facility]   [phone].  IF A COMPLICATION OR EMERGENCY SITUATION ARISES AND YOU ARE UNABLE TO REACH   YOUR PHYSICIAN - GO DIRECTLY TO THE EMERGENCY ROOM.  MD Pamela Hicks MD  1/15/2019 8:17:38 AM  This report has been verified and signed electronically.  [employer]

## 2019-01-15 NOTE — PLAN OF CARE
Jennifer Bonilla has met all discharge criteria from Phase II. Vital Signs are stable, ambulating  without difficulty. Discharge instructions given, patient verbalized understanding. Discharged from facility via wheelchair in stable condition.

## 2019-01-15 NOTE — ANESTHESIA POSTPROCEDURE EVALUATION
"Anesthesia Post Evaluation    Patient: Jennifer Bonilla    Procedure(s) Performed: Procedure(s) (LRB):  COLONOSCOPY (N/A)  EGD (ESOPHAGOGASTRODUODENOSCOPY) (N/A)  POLYPECTOMY    Final Anesthesia Type: general  Patient location during evaluation: [facility]  Patient participation: Yes- Able to Participate  Level of consciousness: awake and alert, awake and oriented  Post-procedure vital signs: reviewed and stable  Pain management: adequate  Airway patency: patent  PONV status at discharge: No PONV  Anesthetic complications: no      Cardiovascular status: blood pressure returned to baseline  Respiratory status: unassisted, spontaneous ventilation and room air  Hydration status: euvolemic  Follow-up not needed.        Visit Vitals  BP (!) 150/76 (BP Location: Left arm, Patient Position: Lying)   Pulse 61   Temp 36.7 °C (98.1 °F) (Oral)   Resp 16   Ht 5' 5" (1.651 m)   Wt 95.3 kg (210 lb)   LMP  (Within Years)   SpO2 99%   Breastfeeding? No   BMI 34.95 kg/m²       Pain/Tequila Score: No Data Recorded      "

## 2019-02-01 DIAGNOSIS — M25.562 LEFT KNEE PAIN, UNSPECIFIED CHRONICITY: Primary | ICD-10-CM

## 2019-02-06 ENCOUNTER — HOSPITAL ENCOUNTER (OUTPATIENT)
Dept: RADIOLOGY | Facility: HOSPITAL | Age: 49
Discharge: HOME | End: 2019-02-06
Attending: ORTHOPAEDIC SURGERY
Payer: COMMERCIAL

## 2019-02-06 ENCOUNTER — OFFICE VISIT (OUTPATIENT)
Dept: ORTHOPEDICS | Facility: CLINIC | Age: 49
End: 2019-02-06
Payer: COMMERCIAL

## 2019-02-06 VITALS — HEIGHT: 65 IN | BODY MASS INDEX: 35.72 KG/M2 | WEIGHT: 214.38 LBS

## 2019-02-06 DIAGNOSIS — M25.562 LEFT KNEE PAIN, UNSPECIFIED CHRONICITY: ICD-10-CM

## 2019-02-06 DIAGNOSIS — M25.562 CHRONIC PAIN OF LEFT KNEE: Primary | ICD-10-CM

## 2019-02-06 DIAGNOSIS — G89.29 CHRONIC PAIN OF LEFT KNEE: Primary | ICD-10-CM

## 2019-02-06 PROCEDURE — 73560 X-RAY EXAM OF KNEE 1 OR 2: CPT | Mod: 26,LT,, | Performed by: RADIOLOGY

## 2019-02-06 PROCEDURE — 99213 PR OFFICE/OUTPT VISIT, EST, LEVL III, 20-29 MIN: ICD-10-PCS | Mod: S$GLB,,, | Performed by: ORTHOPAEDIC SURGERY

## 2019-02-06 PROCEDURE — 73562 XR KNEE ORTHO LEFT: ICD-10-PCS | Mod: 26,LT,, | Performed by: RADIOLOGY

## 2019-02-06 PROCEDURE — 99999 PR PBB SHADOW E&M-EST. PATIENT-LVL III: CPT | Mod: PBBFAC,,, | Performed by: ORTHOPAEDIC SURGERY

## 2019-02-06 PROCEDURE — 73560 X-RAY EXAM OF KNEE 1 OR 2: CPT | Mod: TC,LT

## 2019-02-06 PROCEDURE — 3008F PR BODY MASS INDEX (BMI) DOCUMENTED: ICD-10-PCS | Mod: CPTII,S$GLB,, | Performed by: ORTHOPAEDIC SURGERY

## 2019-02-06 PROCEDURE — 73562 X-RAY EXAM OF KNEE 3: CPT | Mod: 26,LT,, | Performed by: RADIOLOGY

## 2019-02-06 PROCEDURE — 73560 XR KNEE ORTHO LEFT: ICD-10-PCS | Mod: 26,LT,, | Performed by: RADIOLOGY

## 2019-02-06 PROCEDURE — 99999 PR PBB SHADOW E&M-EST. PATIENT-LVL III: ICD-10-PCS | Mod: PBBFAC,,, | Performed by: ORTHOPAEDIC SURGERY

## 2019-02-06 PROCEDURE — 3008F BODY MASS INDEX DOCD: CPT | Mod: CPTII,S$GLB,, | Performed by: ORTHOPAEDIC SURGERY

## 2019-02-06 PROCEDURE — 99213 OFFICE O/P EST LOW 20 MIN: CPT | Mod: S$GLB,,, | Performed by: ORTHOPAEDIC SURGERY

## 2019-02-06 NOTE — PROGRESS NOTES
"Subjective:      Patient ID: Jennifer Bonilla is a 48 y.o. female.    Chief Complaint: Pain of the Left Knee    HPI  Jennifer Bonilla has minimal left knee pain.  The pain has improved. She had to stop PT because of scheduling issues with work.  She has achy non radiating anterior pain, made worse with stairs and alleviated by rest. Her history, medications and problem list were reviewed.    Review of Systems   Constitution: Negative for chills, fever and night sweats.   HENT: Negative for hearing loss.    Eyes: Negative for blurred vision and double vision.   Cardiovascular: Negative for chest pain, claudication and leg swelling.   Respiratory: Negative for shortness of breath.    Endocrine: Negative for polydipsia, polyphagia and polyuria.   Hematologic/Lymphatic: Negative for adenopathy and bleeding problem. Does not bruise/bleed easily.   Skin: Negative for poor wound healing.   Musculoskeletal: Positive for joint pain.   Gastrointestinal: Negative for diarrhea and heartburn.   Genitourinary: Negative for bladder incontinence.   Neurological: Negative for focal weakness, headaches, numbness, paresthesias and sensory change.   Psychiatric/Behavioral: The patient is not nervous/anxious.    Allergic/Immunologic: Negative for persistent infections.         Objective:      Body mass index is 35.68 kg/m².  Vitals:    02/06/19 1559   Weight: 97.3 kg (214 lb 6.4 oz)   Height: 5' 5" (1.651 m)           General    Constitutional: She is oriented to person, place, and time. She appears well-developed and well-nourished.   HENT:   Head: Normocephalic and atraumatic.   Eyes: EOM are normal.   Cardiovascular: Normal rate.    Pulmonary/Chest: Effort normal.   Neurological: She is alert and oriented to person, place, and time.   Psychiatric: She has a normal mood and affect. Her behavior is normal.     General Musculoskeletal Exam   Gait: normal       Right Knee Exam     Inspection   Erythema: absent  Scars: " absent  Swelling: absent  Effusion: absent  Deformity: absent  Bruising: absent    Tenderness   The patient is experiencing no tenderness.     Range of Motion   Extension: 0   Flexion: 130     Tests   Ligament Examination Lachman: normal (-1 to 2mm)   MCL - Valgus: normal (0 to 2mm)  LCL - Varus: normal  Patella   Passive Patellar Tilt: neutral    Other   Sensation: normal    Left Knee Exam     Inspection   Erythema: absent  Scars: present  Swelling: absent  Effusion: absent  Deformity: absent  Bruising: absent    Tenderness   The patient is experiencing no tenderness.     Range of Motion   Extension: 0   Flexion: 130     Tests   Stability Lachman: normal (-1 to 2mm)   MCL - Valgus: normal (0 to 2mm)  LCL - Varus: normal (0 to 2mm)  Patella   Passive Patellar Tilt: neutral    Other   Sensation: normal    Muscle Strength   Right Lower Extremity   Hip Abduction: 5/5   Quadriceps:  5/5   Hamstrin/5   Left Lower Extremity   Hip Abduction: 5/5   Quadriceps:  5/5   Hamstrin/5     Reflexes     Left Side  Quadriceps:  2+    Right Side   Quadriceps:  2+    Vascular Exam     Right Pulses  Dorsalis Pedis:      2+          Left Pulses  Dorsalis Pedis:      2+          Edema  Right Lower Leg: absent  Left Lower Leg: absent    Radiographs taken today and reviewed by me demonstrate mild arthritic change of the left KNEE(s).There  is not bone destruction.  There is not a fracture. The medial compartment is most involved.  No change from previous            Assessment:       Encounter Diagnosis   Name Primary?    Chronic pain of left knee Yes          Plan:       Jennifer was seen today for pain.    Diagnoses and all orders for this visit:    Chronic pain of left knee        Overall doing well.  F/U in 6 months with left knee xrays.

## 2019-07-22 ENCOUNTER — OFFICE VISIT (OUTPATIENT)
Dept: FAMILY MEDICINE | Facility: CLINIC | Age: 49
End: 2019-07-22
Payer: COMMERCIAL

## 2019-07-22 VITALS
DIASTOLIC BLOOD PRESSURE: 88 MMHG | HEART RATE: 68 BPM | HEIGHT: 65 IN | TEMPERATURE: 98 F | OXYGEN SATURATION: 97 % | WEIGHT: 219.13 LBS | SYSTOLIC BLOOD PRESSURE: 122 MMHG | BODY MASS INDEX: 36.51 KG/M2

## 2019-07-22 DIAGNOSIS — R23.2 FLUSHING: ICD-10-CM

## 2019-07-22 DIAGNOSIS — Z80.0 FAMILY HISTORY OF COLON CANCER: ICD-10-CM

## 2019-07-22 DIAGNOSIS — F39 MOOD DISORDER: ICD-10-CM

## 2019-07-22 DIAGNOSIS — Z78.0 POST-MENOPAUSAL: ICD-10-CM

## 2019-07-22 DIAGNOSIS — Z00.00 ROUTINE MEDICAL EXAM: Primary | ICD-10-CM

## 2019-07-22 DIAGNOSIS — G43.709 CHRONIC MIGRAINE W/O AURA W/O STATUS MIGRAINOSUS, NOT INTRACTABLE: ICD-10-CM

## 2019-07-22 DIAGNOSIS — Z86.010 HISTORY OF COLONIC POLYPS: ICD-10-CM

## 2019-07-22 PROBLEM — Z86.0100 HISTORY OF COLONIC POLYPS: Status: ACTIVE | Noted: 2019-07-22

## 2019-07-22 PROCEDURE — 3074F PR MOST RECENT SYSTOLIC BLOOD PRESSURE < 130 MM HG: ICD-10-PCS | Mod: CPTII,S$GLB,, | Performed by: INTERNAL MEDICINE

## 2019-07-22 PROCEDURE — 99396 PR PREVENTIVE VISIT,EST,40-64: ICD-10-PCS | Mod: S$GLB,,, | Performed by: INTERNAL MEDICINE

## 2019-07-22 PROCEDURE — 99999 PR PBB SHADOW E&M-EST. PATIENT-LVL III: CPT | Mod: PBBFAC,,, | Performed by: INTERNAL MEDICINE

## 2019-07-22 PROCEDURE — 99999 PR PBB SHADOW E&M-EST. PATIENT-LVL III: ICD-10-PCS | Mod: PBBFAC,,, | Performed by: INTERNAL MEDICINE

## 2019-07-22 PROCEDURE — 3079F DIAST BP 80-89 MM HG: CPT | Mod: CPTII,S$GLB,, | Performed by: INTERNAL MEDICINE

## 2019-07-22 PROCEDURE — 99396 PREV VISIT EST AGE 40-64: CPT | Mod: S$GLB,,, | Performed by: INTERNAL MEDICINE

## 2019-07-22 PROCEDURE — 3079F PR MOST RECENT DIASTOLIC BLOOD PRESSURE 80-89 MM HG: ICD-10-PCS | Mod: CPTII,S$GLB,, | Performed by: INTERNAL MEDICINE

## 2019-07-22 PROCEDURE — 3074F SYST BP LT 130 MM HG: CPT | Mod: CPTII,S$GLB,, | Performed by: INTERNAL MEDICINE

## 2019-07-22 RX ORDER — BUPROPION HYDROCHLORIDE 150 MG/1
150 TABLET ORAL DAILY
Qty: 30 TABLET | Refills: 11 | Status: SHIPPED | OUTPATIENT
Start: 2019-07-22 | End: 2020-09-24 | Stop reason: SDUPTHER

## 2019-07-22 NOTE — PROGRESS NOTES
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  She does feel less motivated.  She is overeating.  We discussed Wellbutrin and she might be open to that.  We discussed expectation and potential side effects.  She did have a knee scope in 6 months of physical therapy.  She does want to trying get back to the gym.  She apparently is postmenopausal at this time and headaches a better but she is having hot flashes at night and we discussed watching for any increase in sweating and so forth with Wellbutrin.  Reviewed interval history of her family history of colon cancer and colon polyp    She is followed by neurology for chronic migraines.  She's been on verapamil for years.  With the verapamil was increased to 120 mg a day in January or so she is well sustained flushing.  Her face is always red.  Her flushing does not come in episodes.  She has gained some weight.  She tried Topamax but had numbness in the hand.  Her migraines appear to have been hormone related.  She had an ablation and her headaches were gone.  She's had the return of some spotting and menses for the past 8 months and the headaches are back.  Her lab work and thyroid function 12/17 was all normal.  She's had some sustained weight gain me over the past year or so.    Patient seen Neurology 8/18  47-year-old right-handed female presents for evaluation of worsening headaches.  At this time and has been determined that the patient may actually be suffering from perimenopausal changes and hormonal fluctuations which are worsen her headaches.  For now she is still having at least 12 days of headache a month despite taking verapamil 120 mg daily.  She notes that this brings her headaches down to an acceptable level.  We have discussed Aimovig at length today but for now she defers this option.  I will see her back in a year or sooner should her headaches change  or worsen.    I will follow up with them in 12 month(s).      ROS:   CONST: weight stable. EYES: no vision change. ENT: no sore throat. CV: no chest pain w/ exertion. RESP: no shortness of breath. GI: no nausea, vomiting, diarrhea. No dysphagia. : no urinary issues. MUSCULOSKELETAL: no new myalgias or arthralgias. SKIN: no new changes. NEURO: no focal deficits. PSYCH: no new issues. ENDOCRINE: no polyuria. HEME: no lymph nodes. ALLERGY: no general pruritis.      Past Medical History:   Diagnosis Date    Acid reflux     Chronic migraine w/o aura w/o status migrainosus, not intractable 03/21/2017    Interstitial cystitis 9/22/2014    Palpitations 4/1/2014    RBBB 05/09/2014    Pt's Ep study is not c/w WPW or Brugada syndrome.  She has RBBB with short CA interval.    TMJ (temporomandibular joint disorder)     jaw clicking   Family history of colon cancer,   colon polyp 1/19, 5 years        Past Surgical History:   Procedure Laterality Date    ABDOMINAL SURGERY      ABLATION N/A 5/9/2014    [facility] Bell [facility] CATH LAB    ABLATION, ENDOMETRIUM, THERMAL N/A 8/19/2013    [facility]ilson [facility] OR    APPENDECTOMY      ARTHROSCOPY, KNEE Left 8/16/2018    [facility] by [facility]oval [facility] at [facility] OR 2ND FLR    CHOLECYSTECTOMY      COLONOSCOPY N/A 1/15/2019    [facility] ENDO    CYSTOSCOPY      CYSTOSCOPY WITH HYDRODISTENSION N/A 1/31/2017    [facility]formed by Enid L[facility] at [facility] OR 1ST FLR    EGD (ESOPHAGOGASTRODUODENOSCOPY) N/A 1/15/2019    [facility] ENDO    HYSTEROSCOPY, WITH DILATION AND CURETTAGE OF UTERUS N/A 8/19/2013    [facility] at [facility] OR    MENISCECTOMY, KNEE, MEDIAL PARTIAL Left 8/16/2018    [facility] at [facility] OR 2ND FLR    POLYPECTOMY  1/15/2019    [facility] ENDO    RADIOFREQUENCY ABLATION      uterine  ablation      Social History     Social History    Marital status:      Spouse name: N/A    Number of children: N/A    Years of education: N/A     Occupational History     [employer]--- surgical circulating nurse      Social History Main Topics    Smoking status: Never Smoker    Smokeless tobacco: Never Used    Alcohol use 1.5 oz/week     3 Standard drinks or equivalent per week      Comment: socially    Drug use: No    Sexual activity: Yes     Partners: Male     Other Topics Concern    Not on file     Social History Narrative    Works at surgery at [employer].      family history includes Cancer in her father and mother; No Known Problems in her brother, maternal aunt, maternal grandfather, maternal grandmother, maternal uncle, paternal aunt, paternal grandfather, paternal grandmother, paternal uncle, and sister.    Gen: no distress  EYES: conjunctiva clear, non-icteric, PERRL  ENT: nose clear, nasal mucosa normal, oropharynx clear and moist, teeth good  NECK:supple, thyroid non-palpable  RESP: effort is good, lungs clear  CV: heart RRR w/o murmur, gallops or rubs; no carotid bruits, no edema  GI: abdomen soft, non-distended, non-tender, no hepatosplenomegaly  MS: gait normal, no clubbing or cyanosis of the digits,   SKIN: no rashes, warm to touch    Jennifer was seen today for headache.    Diagnoses and all orders for this visit:    Routine medical exam, keep follow-up with gyn, colonoscopy every 5 years  -     TSH; Future  -     T4, free; Future  -     CBC auto differential; Future  -     Lipid panel; Future  -     Comprehensive metabolic panel; Future  -     Vitamin D; Future    Chronic migraine w/o aura w/o status migrainosus, not intractable, some improvement on verapamil and postmenopausal    Flushing, likely hot flashes    Post-menopausal, check vitamin-D level    Mood disorder, initiate Wellbutrin 150    History of colonic polyps    Family history of colon cancer    Other orders  -   "   buPROPion (WELLBUTRIN XL) 150 MG TB24 tablet; Take 1 tablet (150 mg total) by mouth once daily.         clinical note be sensitive due documentation of psychiatric issues"This note will not be shared with the patient."  "

## 2019-09-05 DIAGNOSIS — G44.229 CHRONIC TENSION-TYPE HEADACHE, NOT INTRACTABLE: ICD-10-CM

## 2019-09-05 DIAGNOSIS — G43.709 CHRONIC MIGRAINE WITHOUT AURA WITHOUT STATUS MIGRAINOSUS, NOT INTRACTABLE: ICD-10-CM

## 2019-09-05 RX ORDER — VERAPAMIL HYDROCHLORIDE 120 MG/1
120 CAPSULE, EXTENDED RELEASE ORAL DAILY
Qty: 90 CAPSULE | Refills: 3 | Status: SHIPPED | OUTPATIENT
Start: 2019-09-05 | End: 2020-09-28 | Stop reason: SDUPTHER

## 2019-09-05 NOTE — TELEPHONE ENCOUNTER
----- Message from Heydi Oreilly sent at 9/5/2019  9:33 AM CDT -----  Contact: self  Type: RX Refill Request    Who Called: Patient    Refill or New Rx:refill    RX Name and Strength:verapamil (VERELAN) 120 MG C24P      Preferred Pharmacy with phone number:[id number] - [place], [place] - [address] [facility] [phone] (Phone)  [phone] (Fax)        Local or Mail Order:local    Ordering Provider:Dixon Dial III, MD    Would the patient rather a call back or a response via [facility]? Call back    Best Call Back Number:

## 2019-10-23 DIAGNOSIS — S86.012A PARTIAL TEAR OF ACHILLES TENDON, LEFT, INITIAL ENCOUNTER: Primary | ICD-10-CM

## 2019-10-29 ENCOUNTER — PATIENT OUTREACH (OUTPATIENT)
Dept: ADMINISTRATIVE | Facility: OTHER | Age: 49
End: 2019-10-29

## 2019-10-31 ENCOUNTER — OFFICE VISIT (OUTPATIENT)
Dept: OPTOMETRY | Facility: CLINIC | Age: 49
End: 2019-10-31
Payer: COMMERCIAL

## 2019-10-31 DIAGNOSIS — Z01.00 EXAMINATION OF EYES AND VISION: Primary | ICD-10-CM

## 2019-10-31 DIAGNOSIS — H52.7 REFRACTIVE ERROR: ICD-10-CM

## 2019-10-31 DIAGNOSIS — Z46.0 ENCOUNTER FOR FITTING OR ADJUSTMENT OF SPECTACLES OR CONTACT LENSES: Primary | ICD-10-CM

## 2019-10-31 PROCEDURE — 92015 PR REFRACTION: ICD-10-PCS | Mod: S$GLB,,, | Performed by: OPTOMETRIST

## 2019-10-31 PROCEDURE — 92310 CONTACT LENS FITTING OU: CPT | Mod: CSM,,, | Performed by: OPTOMETRIST

## 2019-10-31 PROCEDURE — 99499 NO LOS: ICD-10-PCS | Mod: S$GLB,,, | Performed by: OPTOMETRIST

## 2019-10-31 PROCEDURE — 99999 PR PBB SHADOW E&M-EST. PATIENT-LVL II: CPT | Mod: PBBFAC,,, | Performed by: OPTOMETRIST

## 2019-10-31 PROCEDURE — 99499 UNLISTED E&M SERVICE: CPT | Mod: S$GLB,,, | Performed by: OPTOMETRIST

## 2019-10-31 PROCEDURE — 92014 PR EYE EXAM, EST PATIENT,COMPREHESV: ICD-10-PCS | Mod: S$GLB,,, | Performed by: OPTOMETRIST

## 2019-10-31 PROCEDURE — 92015 DETERMINE REFRACTIVE STATE: CPT | Mod: S$GLB,,, | Performed by: OPTOMETRIST

## 2019-10-31 PROCEDURE — 92310 PR CONTACT LENS FITTING (NO CHANGE): ICD-10-PCS | Mod: CSM,,, | Performed by: OPTOMETRIST

## 2019-10-31 PROCEDURE — 92014 COMPRE OPH EXAM EST PT 1/>: CPT | Mod: S$GLB,,, | Performed by: OPTOMETRIST

## 2019-10-31 PROCEDURE — 99999 PR PBB SHADOW E&M-EST. PATIENT-LVL II: ICD-10-PCS | Mod: PBBFAC,,, | Performed by: OPTOMETRIST

## 2019-10-31 NOTE — PROGRESS NOTES
Subjective:       Patient ID: Jennifer Bonilla is a 49 y.o. female      Chief Complaint   Patient presents with    Concerns About Ocular Health    Contact Lens Follow Up     History of Present Illness  HPI     Dls: 5/1/18 Dr. Baker     50 y/o female presents today with c/o dry eyes with scls may want to try   new brand of cls.  Pt wears scls only. Pt wants a new rx for both glasses and cls.     No tearing  No itching  No burning  No pain  + ha's  No floaters  No flashes    Eye meds  None    Air Optix MF. Replaces monthly. Does not sleep in lenses.    Last edited by Amelia Parkinson, OD on 10/31/2019  3:18 PM. (History)      Assessment/Plan:     1. Examination of eyes and vision  [facility] vision    2. Refractive error  Educated patient on refractive error and discussed lens options. Dispensed updated spectacle Rx. Educated about adaptation period to new specs.    Eyeglass Final Rx     Eyeglass Final Rx       Sphere Cylinder Axis Add    Right -1.00 Sphere  +1.50    Left -0.50 +0.25 175 +1.50    Expiration Date:  10/31/2020                Contact lens trials dispensed to pt. Daily wear only advised, replacement monthly with education to risks of extended wear.  Discussed lens care, compliance and solutions.      Pt c/o dryness with Air Optix MF. Trial Ultra MF. Pt left with -1.00 Low/ -0.25 Low on eyes. Also given trial of -1.25 Low/ -0.50 Low. Pt will let us know which power she prefers.     Follow up in about 1 week (around 11/7/2019) for PHREV CL final, 1 year annual.

## 2019-11-01 ENCOUNTER — HOSPITAL ENCOUNTER (OUTPATIENT)
Dept: RADIOLOGY | Facility: HOSPITAL | Age: 49
Discharge: HOME | End: 2019-11-01
Attending: ORTHOPAEDIC SURGERY
Payer: COMMERCIAL

## 2019-11-01 DIAGNOSIS — S86.012A PARTIAL TEAR OF ACHILLES TENDON, LEFT, INITIAL ENCOUNTER: ICD-10-CM

## 2019-11-01 PROCEDURE — 73721 MRI JNT OF LWR EXTRE W/O DYE: CPT | Mod: TC,LT

## 2019-11-01 PROCEDURE — 73721 MRI ANKLE WITHOUT CONTRAST LEFT: ICD-10-PCS | Mod: 26,LT,, | Performed by: RADIOLOGY

## 2019-11-01 PROCEDURE — 73721 MRI JNT OF LWR EXTRE W/O DYE: CPT | Mod: 26,LT,, | Performed by: RADIOLOGY

## 2019-11-07 ENCOUNTER — TELEPHONE (OUTPATIENT)
Dept: OPTOMETRY | Facility: CLINIC | Age: 49
End: 2019-11-07

## 2019-11-07 NOTE — TELEPHONE ENCOUNTER
----- Message from Florencia Smith sent at 10/31/2019  4:19 PM CDT -----  Follow up in about 1 week (around 11/7/2019) for PHREV CL final,

## 2019-11-11 ENCOUNTER — TELEPHONE (OUTPATIENT)
Dept: OPTOMETRY | Facility: CLINIC | Age: 49
End: 2019-11-11

## 2019-11-11 NOTE — TELEPHONE ENCOUNTER
Contact Lens Final Rx     Final Contact Lens Rx       Brand Base Curve Diameter Sphere Cylinder Add    Right Bausch [employer] Lomb Ultra for Presbyopia 8.5 14.2 -1.25 Sphere LOW    Left Bausch [employer] Lomb Ultra for Presbyopia 8.5 14.2 -0.50 Sphere LOW    Expiration Date:  11/11/2020    Replacement:  Monthly    Solutions:  OptiFree PureMoist    Wearing Schedule:  Daily wear

## 2019-11-23 ENCOUNTER — OFFICE VISIT (OUTPATIENT)
Dept: URGENT CARE | Facility: CLINIC | Age: 49
End: 2019-11-23
Payer: COMMERCIAL

## 2019-11-23 VITALS
TEMPERATURE: 97 F | RESPIRATION RATE: 16 BRPM | HEART RATE: 68 BPM | DIASTOLIC BLOOD PRESSURE: 87 MMHG | OXYGEN SATURATION: 98 % | BODY MASS INDEX: 33.32 KG/M2 | SYSTOLIC BLOOD PRESSURE: 141 MMHG | WEIGHT: 200 LBS | HEIGHT: 65 IN

## 2019-11-23 DIAGNOSIS — B96.89 ACUTE BACTERIAL SINUSITIS: Primary | ICD-10-CM

## 2019-11-23 DIAGNOSIS — J02.9 SORE THROAT: ICD-10-CM

## 2019-11-23 DIAGNOSIS — R05.9 COUGH: ICD-10-CM

## 2019-11-23 DIAGNOSIS — J01.90 ACUTE BACTERIAL SINUSITIS: Primary | ICD-10-CM

## 2019-11-23 DIAGNOSIS — R68.83 CHILLS: ICD-10-CM

## 2019-11-23 DIAGNOSIS — R51.9 HEADACHE, UNSPECIFIED HEADACHE TYPE: ICD-10-CM

## 2019-11-23 DIAGNOSIS — R53.83 LETHARGY: ICD-10-CM

## 2019-11-23 DIAGNOSIS — R52 BODY ACHES: ICD-10-CM

## 2019-11-23 LAB
CTP QC/QA: YES
FLUAV AG NPH QL: NEGATIVE
FLUBV AG NPH QL: NEGATIVE

## 2019-11-23 PROCEDURE — 3077F SYST BP >= 140 MM HG: CPT | Mod: CPTII,S$GLB,, | Performed by: NURSE PRACTITIONER

## 2019-11-23 PROCEDURE — 87804 INFLUENZA ASSAY W/OPTIC: CPT | Mod: QW,S$GLB,, | Performed by: NURSE PRACTITIONER

## 2019-11-23 PROCEDURE — 96372 PR INJECTION,THERAP/PROPH/DIAG2ST, IM OR SUBCUT: ICD-10-PCS | Mod: S$GLB,,, | Performed by: NURSE PRACTITIONER

## 2019-11-23 PROCEDURE — 3079F PR MOST RECENT DIASTOLIC BLOOD PRESSURE 80-89 MM HG: ICD-10-PCS | Mod: CPTII,S$GLB,, | Performed by: NURSE PRACTITIONER

## 2019-11-23 PROCEDURE — 3079F DIAST BP 80-89 MM HG: CPT | Mod: CPTII,S$GLB,, | Performed by: NURSE PRACTITIONER

## 2019-11-23 PROCEDURE — 99214 PR OFFICE/OUTPT VISIT, EST, LEVL IV, 30-39 MIN: ICD-10-PCS | Mod: 25,S$GLB,, | Performed by: NURSE PRACTITIONER

## 2019-11-23 PROCEDURE — 87804 POCT INFLUENZA A/B: ICD-10-PCS | Mod: 59,QW,S$GLB, | Performed by: NURSE PRACTITIONER

## 2019-11-23 PROCEDURE — 3008F PR BODY MASS INDEX (BMI) DOCUMENTED: ICD-10-PCS | Mod: CPTII,S$GLB,, | Performed by: NURSE PRACTITIONER

## 2019-11-23 PROCEDURE — 96372 THER/PROPH/DIAG INJ SC/IM: CPT | Mod: S$GLB,,, | Performed by: NURSE PRACTITIONER

## 2019-11-23 PROCEDURE — 3008F BODY MASS INDEX DOCD: CPT | Mod: CPTII,S$GLB,, | Performed by: NURSE PRACTITIONER

## 2019-11-23 PROCEDURE — 99214 OFFICE O/P EST MOD 30 MIN: CPT | Mod: 25,S$GLB,, | Performed by: NURSE PRACTITIONER

## 2019-11-23 PROCEDURE — 3077F PR MOST RECENT SYSTOLIC BLOOD PRESSURE >= 140 MM HG: ICD-10-PCS | Mod: CPTII,S$GLB,, | Performed by: NURSE PRACTITIONER

## 2019-11-23 RX ORDER — PROMETHAZINE HYDROCHLORIDE AND DEXTROMETHORPHAN HYDROBROMIDE 6.25; 15 MG/5ML; MG/5ML
5 SYRUP ORAL EVERY 6 HOURS PRN
Qty: 100 ML | Refills: 0 | Status: SHIPPED | OUTPATIENT
Start: 2019-11-23 | End: 2019-11-30

## 2019-11-23 RX ORDER — MELOXICAM 15 MG/1
15 TABLET ORAL DAILY
Qty: 14 TABLET | Refills: 0 | Status: SHIPPED | OUTPATIENT
Start: 2019-11-23 | End: 2019-12-07

## 2019-11-23 RX ORDER — BENZONATATE 100 MG/1
100 CAPSULE ORAL 3 TIMES DAILY PRN
Qty: 20 CAPSULE | Refills: 0 | Status: SHIPPED | OUTPATIENT
Start: 2019-11-23 | End: 2019-11-30

## 2019-11-23 RX ORDER — AZITHROMYCIN 250 MG/1
TABLET, FILM COATED ORAL
Qty: 6 TABLET | Refills: 0 | Status: SHIPPED | OUTPATIENT
Start: 2019-11-23 | End: 2019-11-28

## 2019-11-23 RX ORDER — DEXAMETHASONE SODIUM PHOSPHATE 100 MG/10ML
6 INJECTION INTRAMUSCULAR; INTRAVENOUS ONCE
Status: COMPLETED | OUTPATIENT
Start: 2019-11-23 | End: 2019-11-23

## 2019-11-23 RX ADMIN — DEXAMETHASONE SODIUM PHOSPHATE 6 MG: 100 INJECTION INTRAMUSCULAR; INTRAVENOUS at 12:11

## 2019-11-23 NOTE — PROGRESS NOTES
"Subjective:       Patient ID: Jennifer Bonilla is a 49 y.o. female.    Vitals:  height is 5' 5" (1.651 m) and weight is 90.7 kg (200 lb). Her oral temperature is 96.6 °F (35.9 °C). Her blood pressure is 141/87 (abnormal) and her pulse is 68. Her respiration is 16 and oxygen saturation is 98%.     Chief Complaint: Cough    Patient presents with cough, green sputum and body aches . Patient says her throat was hurting ,but is starting to feel better . Patient denies fever . Onset of symptoms a week ago.     Cough   This is a new problem. The current episode started in the past 7 days. The problem has been unchanged. The problem occurs hourly. The cough is productive of sputum. Associated symptoms include ear pain, myalgias, nasal congestion and a sore throat. Pertinent negatives include no chest pain, chills, fever, headaches, rash or shortness of breath. Treatments tried: otc cough and cold , mucinex  The treatment provided mild relief.       Constitution: Negative for chills, fatigue and fever.   HENT: Positive for ear pain, congestion, sore throat and trouble swallowing.    Neck: Negative for painful lymph nodes.   Cardiovascular: Negative for chest pain and leg swelling.   Eyes: Negative for double vision and blurred vision.   Respiratory: Positive for cough and sputum production. Negative for shortness of breath.    Gastrointestinal: Negative for nausea, vomiting and diarrhea.   Genitourinary: Negative for dysuria, frequency, urgency and history of kidney stones.   Musculoskeletal: Positive for muscle ache. Negative for joint pain, joint swelling and muscle cramps.   Skin: Negative for color change, pale, rash and bruising.   Allergic/Immunologic: Negative for seasonal allergies.   Neurological: Negative for dizziness, history of vertigo, light-headedness, passing out and headaches.   Hematologic/Lymphatic: Negative for swollen lymph nodes.   Psychiatric/Behavioral: Negative for nervous/anxious, sleep " disturbance and depression. The patient is not nervous/anxious.        Objective:      Physical Exam   Constitutional: She is oriented to person, place, and time. She appears well-developed and well-nourished. She is cooperative.  Non-toxic appearance. She does not have a sickly appearance. She does not appear ill. No distress.   HENT:   Head: Normocephalic and atraumatic.   Right Ear: Hearing, external ear and ear canal normal. A middle ear effusion is present.   Left Ear: Hearing, external ear and ear canal normal. A middle ear effusion is present.   Nose: Mucosal edema present. No rhinorrhea or nasal deformity. No epistaxis. Right sinus exhibits maxillary sinus tenderness. Right sinus exhibits no frontal sinus tenderness. Left sinus exhibits maxillary sinus tenderness. Left sinus exhibits no frontal sinus tenderness.   Mouth/Throat: Uvula is midline and mucous membranes are normal. No trismus in the jaw. Normal dentition. No uvula swelling. Posterior oropharyngeal erythema present. No oropharyngeal exudate or posterior oropharyngeal edema.   Eyes: Conjunctivae and lids are normal. No scleral icterus.   Neck: Trachea normal, full passive range of motion without pain and phonation normal. Neck supple. No neck rigidity. No edema and no erythema present.   Cardiovascular: Normal rate, regular rhythm, normal heart sounds, intact distal pulses and normal pulses.   Pulmonary/Chest: Effort normal and breath sounds normal. No respiratory distress. She has no decreased breath sounds. She has no rhonchi.   Abdominal: Normal appearance.   Musculoskeletal: Normal range of motion. She exhibits no edema or deformity.   Lymphadenopathy:     She has cervical adenopathy.        Right cervical: Superficial cervical adenopathy present.        Left cervical: Superficial cervical adenopathy present.   Neurological: She is alert and oriented to person, place, and time. She exhibits normal muscle tone. Coordination normal.   Skin: Skin  is warm, dry, intact, not diaphoretic and not pale.   Psychiatric: She has a normal mood and affect. Her speech is normal and behavior is normal. Judgment and thought content normal. Cognition and memory are normal.   Nursing note and vitals reviewed.        Assessment:       1. Acute bacterial sinusitis    2. Cough    3. Sore throat    4. Chills    5. Body aches    6. Headache, unspecified headache type    7. Lethargy        Plan:         Acute bacterial sinusitis  -     azithromycin (Z-LISANDRO) 250 MG tablet; Take 2 tablets (500 mg total) by mouth once daily for 1 day, THEN 1 tablet (250 mg total) once daily for 4 days. One Z-Pack as directed.  Dispense: 6 tablet; Refill: 0    Cough  -     POCT Influenza A/B  -     benzonatate (TESSALON) 100 MG capsule; Take 1 capsule (100 mg total) by mouth 3 (three) times daily as needed for Cough.  Dispense: 20 capsule; Refill: 0  -     promethazine-dextromethorphan (PROMETHAZINE-DM) 6.25-15 mg/5 mL Syrp; Take 5 mLs by mouth every 6 (six) hours as needed (May take every 4 hours, PRN. DoNOT take more than 30 mL in 24 hours.).  Dispense: 100 mL; Refill: 0    Sore throat    Chills    Body aches  -     meloxicam (MOBIC) 15 MG tablet; Take 1 tablet (15 mg total) by mouth once daily. for 14 days  Dispense: 14 tablet; Refill: 0    Headache, unspecified headache type    Lethargy      Results for orders placed or performed in visit on 11/23/19   POCT Influenza A/B   Result Value Ref Range    Rapid Influenza A Ag Negative Negative    Rapid Influenza B Ag Negative Negative     Acceptable Yes      Patient Instructions     Always follow your healthcare professional's instructions.    You have received urgent care diagnosis and treatment and you may be released before all of your medical problems are known or treated. Unless you have been given a referral, you (the patient), will arrange for follow-up care as instructed.     Please follow up with your primary care provider within  5-7 days if your signs and symptoms have not resolved or have worsen.     If your condition worsens or fails to improve, I recommend that you receive another evaluation in the emergency room immediately or contact your primary care office to discuss your concerns.     Your DIAGNOSIS today is Acute Bacterial Sinusitis      What is acute sinusitis?  Sinuses are air-filled spaces in the skull behind the face. They are kept moist and clean by a lining of mucosa. Things such as pollen, smoke, and chemical fumes can irritate the mucosa. It can then swell up. As a response to irritation, the mucosa makes more mucus and other fluids. Tiny hairlike cilia cover the mucosa. Cilia help carry mucus toward the opening of the sinus. Too much mucus may cause the cilia to stop working. This blocks the sinus opening. A buildup of fluid in the sinuses then causes pain and pressure. It can also encourage bacteria to grow in the sinuses.    Common symptoms of acute sinusitis/You may have:  · Facial soreness pain  · Headache  · Fever  · Fluid draining in the back of the throat (postnasal drip)  · Congestion  · Drainage that is thick and colored, instead of clear  · Cough    Diagnosing acute sinusitis  Your doctor will ask about your symptoms and health history. He or she will look at your ear, nose, and throat. You usually won't need to have X-rays taken.    The doctor may take a sample of mucus to check for bacteria. If you have sinusitis that keeps coming back, you may need imaging tests such as X-rays or CAT scans. This will help your doctor check for a structural problem that may be causing the infection.    Treating acute sinusitis  Treatment is aimed at unblocking the sinus opening and helping the cilia work again. You may need to take antihistamine and decongestant medicine. These can reduce inflammation and decrease the amount of fluid your sinuses make. If you have a bacterial infection, you will need to take antibiotic medicine  for 10 to 14 days. Take this medicine until it is gone, even if you feel better.    You have been given an antibiotic to treat your condition today.  Even if your symptoms improve, please complete the medication as directed on the bottle.     Antibiotics work to destroy bacteria. They may also alter the good bacteria in your gut. Use probiotics and/or high culture yogurt about two hours apart from the antibiotic and about one week after the antibiotic to replace the good bacteria and prevent negative gastro intestinal consequences.    Common antibiotic treatments:  Cefdinir, is a third-generation oral cephalosporin, may cause loose, red stools when administered with products that contain iron. Avoid excessive exposure to sunlight or tanning beds. Use an SPF 30 or higher sunblock when outside and wear protective clothing as azithromycin can make you sunburn more easily.     If you have questions about whether you should take your medications with food, you should read the medication instructions provided to you with your medication, or contact your pharmacy.    If you are female and on BCP use additional methods to prevent pregnancy while on antibiotics and for one cycle after.     Symptom management:    Cough Allergy Symptoms Asthma   Tessalon Perles are a non-narcotic cough medicine. It works by numbing the throat and lungs, making the cough reflex less active, it is used to relieve coughing during the day. If you have been given Phenergan DM cough syrup, you do NOT need to take both medications at the same time.    Phenergan DM is a combination medication that is used to treat cough. Phenergan DM works like an antihistamine and cough suppressant. This medication will make you sleepy like Benadryl, have a drying effect, and act on a part of the brain (cough center) to reduce the need to cough. DO NOT take Benadryl and Phenergan together. Take over-the-counter claritin, zyrtec, allegra, or xyzal as directed, these  "are antihistamines that will work to dry up secretions/mucus. Antihistamines work to block the effects of a certain natural substance (histamine), which causes allergy symptoms.    Use over the counter Flonase as directed for sinus congestion and postnasal drip. Proper administration is to "look down at your toes and aim for your nose". The goal is to aim for your nasolabial folds, the creases in your nose. If you feel the medication drip down your throat, you have NOT administered it correctly. If you can "smell the roses" (floral scent), then you have administered it correctly. If you have a history of asthma, expressed a concern about wheezing or have been told you were wheezing during your exam today, you are being given Albuterol. Albuterol is a bronchodilator that relaxes muscles in the airways and increases air flow to the lungs; it is used to treat or prevent bronchospasm, or narrowing of the airways in the lungs.     If you have a history of asthma, expressed a concern about wheezing or have been told you were wheezing during your exam today and are NOT being prescribed Albuterol that is because you have insured me that you have an adequate supply of the drug at home.          Why didn't I get a steroid shot or a medrol dose pack?  It is suggested NOT to use glucocorticoids in the treatment of acute bacterial sinusitis. When given in addition to antibiotics, oral steroids may shorten the time to symptom resolution or improvement (so will the above recommendations). The benefits of steroids are small and, unlike topical glucocorticoids, systemic glucocorticoids possess a significant side effect profile.     Major side effects include:     Skin consequences: Skin thinning and ecchymoses, Cushingoid appearance (rounded face), acne, weight gain, mild hirsutism, facial erythema, and striae.   Eye consequences: Cataracts, increased intraocular pressure, exophthalmos.    Cardiovascular consequences: Fluid " retention, premature atherosclerotic disease, and arrhythmias.    GI consequences: Increased risk for adverse gastrointestinal effects, such as gastritis, ulcer formation, and gastrointestinal bleeding   Bone and muscle consequences: Osteoporosis, osteonecrosis, and myopathy.   Neuropsychiatric effects: Mood disorders, psychosis, memory impairment, and    Metabolic and Endocrine consequences: Suppress the hypothalamic-pituitary-adrenal (HPA) axis and increase blood sugar.   Effects immunity predisposing you to getting a more severe infection and increases your white blood cell count.   Young children -- Growth impairment        Can I have a shot instead of pills?  No. I have diagnosed you with acute bacterial sinusitis and I want you to have a FULL course of antibiotics and not just a one time antibiotic shot. I have discussed above why you did not receive a steroid shot.    Your provider discussed your plan of care with you during your physical exam. It was reviewed once more by the provider when giving you an after visit summary. If the patient is a minor, the discharge instructions were discussed with an adult and that adult acknowledge their understanding of the provider's teaching.

## 2019-11-26 ENCOUNTER — TELEPHONE (OUTPATIENT)
Dept: URGENT CARE | Facility: CLINIC | Age: 49
End: 2019-11-26

## 2019-12-12 ENCOUNTER — TELEPHONE (OUTPATIENT)
Dept: FAMILY MEDICINE | Facility: CLINIC | Age: 49
End: 2019-12-12

## 2019-12-12 ENCOUNTER — PATIENT MESSAGE (OUTPATIENT)
Dept: FAMILY MEDICINE | Facility: CLINIC | Age: 49
End: 2019-12-12

## 2019-12-12 DIAGNOSIS — Z00.00 ROUTINE MEDICAL EXAM: Primary | ICD-10-CM

## 2019-12-12 NOTE — TELEPHONE ENCOUNTER
----- Message from Heydi Oreilly sent at 12/12/2019  9:51 AM CST -----  Contact: self  Type: Patient Call Back    Who called:self    What is the request in detail:pt needs to speak to nurse    Can the clinic reply by [facility]?no    Would the patient rather a call back or a response via [facility]? call    Best call back number:

## 2019-12-18 ENCOUNTER — LAB VISIT (OUTPATIENT)
Dept: LAB | Facility: HOSPITAL | Age: 49
End: 2019-12-18
Attending: INTERNAL MEDICINE
Payer: COMMERCIAL

## 2019-12-18 DIAGNOSIS — Z00.00 ROUTINE MEDICAL EXAM: ICD-10-CM

## 2019-12-18 LAB
ALBUMIN SERPL BCP-MCNC: 4.1 G/DL (ref 3.5–5.2)
ALP SERPL-CCNC: 72 U/L (ref 55–135)
ALT SERPL W/O P-5'-P-CCNC: 19 U/L (ref 10–44)
ANION GAP SERPL CALC-SCNC: 6 MMOL/L (ref 8–16)
AST SERPL-CCNC: 13 U/L (ref 10–40)
BASOPHILS # BLD AUTO: 0.06 K/UL (ref 0–0.2)
BASOPHILS NFR BLD: 0.8 % (ref 0–1.9)
BILIRUB SERPL-MCNC: 0.6 MG/DL (ref 0.1–1)
BUN SERPL-MCNC: 14 MG/DL (ref 6–20)
CALCIUM SERPL-MCNC: 9.8 MG/DL (ref 8.7–10.5)
CHLORIDE SERPL-SCNC: 109 MMOL/L (ref 95–110)
CHOLEST SERPL-MCNC: 210 MG/DL (ref 120–199)
CHOLEST/HDLC SERPL: 4 {RATIO} (ref 2–5)
CK SERPL-CCNC: 47 U/L (ref 20–180)
CO2 SERPL-SCNC: 28 MMOL/L (ref 23–29)
CREAT SERPL-MCNC: 1 MG/DL (ref 0.5–1.4)
DIFFERENTIAL METHOD: NORMAL
EOSINOPHIL # BLD AUTO: 0.4 K/UL (ref 0–0.5)
EOSINOPHIL NFR BLD: 5.2 % (ref 0–8)
ERYTHROCYTE [DISTWIDTH] IN BLOOD BY AUTOMATED COUNT: 13.2 % (ref 11.5–14.5)
ERYTHROCYTE [SEDIMENTATION RATE] IN BLOOD BY WESTERGREN METHOD: 2 MM/HR (ref 0–20)
EST. GFR  (AFRICAN AMERICAN): >60 ML/MIN/1.73 M^2
EST. GFR  (NON AFRICAN AMERICAN): >60 ML/MIN/1.73 M^2
GLUCOSE SERPL-MCNC: 103 MG/DL (ref 70–110)
HCT VFR BLD AUTO: 43.9 % (ref 37–48.5)
HDLC SERPL-MCNC: 53 MG/DL (ref 40–75)
HDLC SERPL: 25.2 % (ref 20–50)
HGB BLD-MCNC: 14.1 G/DL (ref 12–16)
IMM GRANULOCYTES # BLD AUTO: 0.04 K/UL (ref 0–0.04)
IMM GRANULOCYTES NFR BLD AUTO: 0.5 % (ref 0–0.5)
LDLC SERPL CALC-MCNC: 127.6 MG/DL (ref 63–159)
LYMPHOCYTES # BLD AUTO: 2.7 K/UL (ref 1–4.8)
LYMPHOCYTES NFR BLD: 36.6 % (ref 18–48)
MCH RBC QN AUTO: 29.4 PG (ref 27–31)
MCHC RBC AUTO-ENTMCNC: 32.1 G/DL (ref 32–36)
MCV RBC AUTO: 92 FL (ref 82–98)
MONOCYTES # BLD AUTO: 0.6 K/UL (ref 0.3–1)
MONOCYTES NFR BLD: 8.6 % (ref 4–15)
NEUTROPHILS # BLD AUTO: 3.5 K/UL (ref 1.8–7.7)
NEUTROPHILS NFR BLD: 48.3 % (ref 38–73)
NONHDLC SERPL-MCNC: 157 MG/DL
NRBC BLD-RTO: 0 /100 WBC
PLATELET # BLD AUTO: 281 K/UL (ref 150–350)
PMV BLD AUTO: 12 FL (ref 9.2–12.9)
POTASSIUM SERPL-SCNC: 4.3 MMOL/L (ref 3.5–5.1)
PROT SERPL-MCNC: 7.2 G/DL (ref 6–8.4)
RBC # BLD AUTO: 4.79 M/UL (ref 4–5.4)
RHEUMATOID FACT SERPL-ACNC: 13 IU/ML (ref 0–15)
SODIUM SERPL-SCNC: 143 MMOL/L (ref 136–145)
T4 FREE SERPL-MCNC: 0.96 NG/DL (ref 0.71–1.51)
TRIGL SERPL-MCNC: 147 MG/DL (ref 30–150)
TSH SERPL DL<=0.005 MIU/L-ACNC: 2.06 UIU/ML (ref 0.4–4)
WBC # BLD AUTO: 7.3 K/UL (ref 3.9–12.7)

## 2019-12-18 PROCEDURE — 86038 ANTINUCLEAR ANTIBODIES: CPT

## 2019-12-18 PROCEDURE — 80061 LIPID PANEL: CPT

## 2019-12-18 PROCEDURE — 84443 ASSAY THYROID STIM HORMONE: CPT

## 2019-12-18 PROCEDURE — 86235 NUCLEAR ANTIGEN ANTIBODY: CPT | Mod: 59

## 2019-12-18 PROCEDURE — 85025 COMPLETE CBC W/AUTO DIFF WBC: CPT

## 2019-12-18 PROCEDURE — 36415 COLL VENOUS BLD VENIPUNCTURE: CPT

## 2019-12-18 PROCEDURE — 85652 RBC SED RATE AUTOMATED: CPT

## 2019-12-18 PROCEDURE — 80053 COMPREHEN METABOLIC PANEL: CPT

## 2019-12-18 PROCEDURE — 84439 ASSAY OF FREE THYROXINE: CPT

## 2019-12-18 PROCEDURE — 86039 ANTINUCLEAR ANTIBODIES (ANA): CPT

## 2019-12-18 PROCEDURE — 86431 RHEUMATOID FACTOR QUANT: CPT

## 2019-12-18 PROCEDURE — 82550 ASSAY OF CK (CPK): CPT

## 2019-12-19 LAB
ANA SER QL IF: POSITIVE
ANA TITR SER IF: NORMAL {TITER}

## 2019-12-23 LAB
ANTI SM ANTIBODY: 1.93 EU (ref 0–19.99)
ANTI SM/RNP ANTIBODY: 1.75 EU (ref 0–19.99)
ANTI-SM INTERPRETATION: NEGATIVE
ANTI-SM/RNP INTERPRETATION: NEGATIVE
ANTI-SSA ANTIBODY: 1.75 EU (ref 0–19.99)
ANTI-SSA INTERPRETATION: NEGATIVE
ANTI-SSB ANTIBODY: 0 EU (ref 0–19.99)
ANTI-SSB INTERPRETATION: NEGATIVE
DSDNA AB SER-ACNC: NORMAL [IU]/ML

## 2019-12-26 DIAGNOSIS — M54.50 LOW BACK PAIN: ICD-10-CM

## 2019-12-26 DIAGNOSIS — M54.16 LUMBAR RADICULOPATHY: Primary | ICD-10-CM

## 2020-01-02 ENCOUNTER — HOSPITAL ENCOUNTER (OUTPATIENT)
Dept: RADIOLOGY | Facility: HOSPITAL | Age: 50
Discharge: HOME | End: 2020-01-02
Attending: ORTHOPAEDIC SURGERY
Payer: COMMERCIAL

## 2020-01-02 DIAGNOSIS — M54.50 LOW BACK PAIN: ICD-10-CM

## 2020-01-02 DIAGNOSIS — M54.16 LUMBAR RADICULOPATHY: ICD-10-CM

## 2020-01-02 PROCEDURE — 72148 MRI LUMBAR SPINE WITHOUT CONTRAST: ICD-10-PCS | Mod: 26,,, | Performed by: RADIOLOGY

## 2020-01-02 PROCEDURE — 72148 MRI LUMBAR SPINE W/O DYE: CPT | Mod: 26,,, | Performed by: RADIOLOGY

## 2020-01-02 PROCEDURE — 72148 MRI LUMBAR SPINE W/O DYE: CPT | Mod: TC

## 2020-01-07 ENCOUNTER — OFFICE VISIT (OUTPATIENT)
Dept: URGENT CARE | Facility: CLINIC | Age: 50
End: 2020-01-07
Payer: COMMERCIAL

## 2020-01-07 VITALS
TEMPERATURE: 96 F | RESPIRATION RATE: 16 BRPM | DIASTOLIC BLOOD PRESSURE: 84 MMHG | OXYGEN SATURATION: 98 % | HEART RATE: 74 BPM | HEIGHT: 65 IN | WEIGHT: 200 LBS | SYSTOLIC BLOOD PRESSURE: 146 MMHG | BODY MASS INDEX: 33.32 KG/M2

## 2020-01-07 DIAGNOSIS — J01.90 ACUTE SINUSITIS, RECURRENCE NOT SPECIFIED, UNSPECIFIED LOCATION: ICD-10-CM

## 2020-01-07 DIAGNOSIS — R03.0 ELEVATED BLOOD PRESSURE READING: Primary | ICD-10-CM

## 2020-01-07 PROCEDURE — 99214 OFFICE O/P EST MOD 30 MIN: CPT | Mod: 25,S$GLB,, | Performed by: NURSE PRACTITIONER

## 2020-01-07 PROCEDURE — 96372 THER/PROPH/DIAG INJ SC/IM: CPT | Mod: S$GLB,,, | Performed by: NURSE PRACTITIONER

## 2020-01-07 PROCEDURE — 96372 PR INJECTION,THERAP/PROPH/DIAG2ST, IM OR SUBCUT: ICD-10-PCS | Mod: S$GLB,,, | Performed by: NURSE PRACTITIONER

## 2020-01-07 PROCEDURE — 99214 PR OFFICE/OUTPT VISIT, EST, LEVL IV, 30-39 MIN: ICD-10-PCS | Mod: 25,S$GLB,, | Performed by: NURSE PRACTITIONER

## 2020-01-07 RX ORDER — AZITHROMYCIN 250 MG/1
TABLET, FILM COATED ORAL
Qty: 6 TABLET | Refills: 0 | Status: SHIPPED | OUTPATIENT
Start: 2020-01-07 | End: 2022-01-24

## 2020-01-07 RX ORDER — DEXAMETHASONE SODIUM PHOSPHATE 100 MG/10ML
6 INJECTION INTRAMUSCULAR; INTRAVENOUS
Status: COMPLETED | OUTPATIENT
Start: 2020-01-07 | End: 2020-01-07

## 2020-01-07 RX ADMIN — DEXAMETHASONE SODIUM PHOSPHATE 6 MG: 100 INJECTION INTRAMUSCULAR; INTRAVENOUS at 07:01

## 2020-01-08 NOTE — PROGRESS NOTES
"Subjective:       Patient ID: Jennifer Bonilla is a 49 y.o. female.    Vitals:  height is 5' 5" (1.651 m) and weight is 90.7 kg (200 lb). Her temperature is 96.1 °F (35.6 °C). Her blood pressure is 146/84 (abnormal) and her pulse is 74. Her respiration is 16 and oxygen saturation is 98%.     Chief Complaint: Cough (Sinus)    This is a 49 y.o. female who presents today with a chief complaint of sinus and cough for a week.      Cough   This is a new problem. The current episode started 1 to 4 weeks ago. The problem has been gradually worsening. The cough is productive of sputum. Associated symptoms include nasal congestion, postnasal drip and rhinorrhea. Pertinent negatives include no chills, ear pain, eye redness, fever, hemoptysis, myalgias, rash, sore throat, shortness of breath or wheezing. She has tried OTC cough suppressant for the symptoms. The treatment provided no relief. There is no history of asthma, bronchitis or pneumonia.   Sinus Problem   This is a new problem. The current episode started 1 to 4 weeks ago. The problem has been gradually worsening since onset. There has been no fever. Her pain is at a severity of 0/10. She is experiencing no pain. Associated symptoms include congestion and coughing. Pertinent negatives include no chills, diaphoresis, ear pain, shortness of breath, sinus pressure or sore throat. Past treatments include oral decongestants. The treatment provided no relief.       Constitution: Negative for chills, sweating, fatigue and fever.   HENT: Positive for congestion and postnasal drip. Negative for ear pain, sinus pain, sinus pressure, sore throat and voice change.    Neck: Negative for painful lymph nodes.   Eyes: Positive for eye discharge. Negative for eye redness.   Respiratory: Positive for cough and sputum production. Negative for chest tightness, bloody sputum, COPD, shortness of breath, stridor, wheezing and asthma.    Gastrointestinal: Negative for nausea and " "vomiting.   Musculoskeletal: Negative for muscle ache.   Skin: Negative for rash.   Allergic/Immunologic: Negative for seasonal allergies and asthma.   Hematologic/Lymphatic: Negative for swollen lymph nodes.       Objective:      Physical Exam      Assessment:       1. Elevated blood pressure reading    2. Acute sinusitis, recurrence not specified, unspecified location        Plan:         Elevated blood pressure reading    Acute sinusitis, recurrence not specified, unspecified location  -     azithromycin (ZITHROMAX Z-LISANDRO) 250 MG tablet; Take 2 tablets (500 mg) on  Day 1,  followed by 1 tablet (250 mg) once daily on Days 2 through 5.  Dispense: 6 tablet; Refill: 0  -     dexamethasone injection 6 mg      Patient Instructions   Please follow up with your Primary care provider within 2-5 days if your signs and symptoms have not resolved or worsen.  The usual course of cold symptoms are 10-14 days.     If your condition worsens or fails to improve we recommend that you receive another evaluation at the emergency room immediately or contact your primary medical clinic to discuss your concerns.     You must understand that you have received an Urgent Care treatment only and that you may be released before all of your medical problems are known or treated.   You, the patient, will arrange for follow up care as instructed.     Tylenol or Ibuprofen can also be used as directed for pain/fever unless you have an allergy to them or medical condition such as stomach ulcers, kidney or liver disease or blood thinners etc for which you should not be taking these type of medications.     Take over the counter cough medication as directed as needed for cough.  You should avoid medications with pseudoephedrine or phenylephrine (any medication with "D") if you have high blood pressure as this can cause an elevation in your blood pressure. Instead consider Corcidin HBP as needed to prevent an elevated blood pressure.     Natural " remedies of symptoms (as needed) include humidification, saline nasal sprays, and/or steamy showers.  Increase fluids, warm tea with honey, cough drops as needed.  You may also use salt water gargles for sore throat.    IF you received a steroid shot today - As discussed, this can elevate your blood pressure, elevate your blood sugar, water weight gain, nervous energy, redness to the face and dimpling of the skin at the injection site.       You have been given an antibiotic to treat your condition today.  Please complete the antibiotic as directed on the bottle.     As with any antibiotics, use probiotics and/or high culture yogurt about 2 hours apart from the antibiotic and about 1 week after the antibiotic to replace the gut bran lost with antibiotic use.      If you are female and on BCP use additional methods to prevent pregnancy while on antibiotics and for one cycle after.       Sinusitis (Antibiotic Treatment)    The sinuses are air-filled spaces within the bones of the face. They connect to the inside of the nose. Sinusitis is an inflammation of the tissue lining the sinus cavity. Sinus inflammation can occur during a cold. It can also be due to allergies to pollens and other particles in the air. Sinusitis can cause symptoms of sinus congestion and fullness. A sinus infection causes fever, headache and facial pain. There is often green or yellow drainage from the nose or into the back of the throat (post-nasal drip). You have been given antibiotics to treat this condition.  Home care:  · Take the full course of antibiotics as instructed. Do not stop taking them, even if you feel better.  · Drink plenty of water, hot tea, and other liquids. This may help thin mucus. It also may promote sinus drainage.  · Heat may help soothe painful areas of the face. Use a towel soaked in hot water. Or,  the shower and direct the hot spray onto your face. Using a vaporizer along with a menthol rub at night may  also help.   · An expectorant containing guaifenesin may help thin the mucus and promote drainage from the sinuses.  · Over-the-counter decongestants may be used unless a similar medicine was prescribed. Nasal sprays work the fastest. Use one that contains phenylephrine or oxymetazoline. First blow the nose gently. Then use the spray. Do not use these medicines more often than directed on the label or symptoms may get worse. You may also use tablets containing pseudoephedrine. Avoid products that combine ingredients, because side effects may be increased. Read labels. You can also ask the pharmacist for help. (NOTE: Persons with high blood pressure should not use decongestants. They can raise blood pressure.)  · Over-the-counter antihistamines may help if allergies contributed to your sinusitis.    · Do not use nasal rinses or irrigation during an acute sinus infection, unless told to by your health care provider. Rinsing may spread the infection to other sinuses.  · Use acetaminophen or ibuprofen to control pain, unless another pain medicine was prescribed. (If you have chronic liver or kidney disease or ever had a stomach ulcer, talk with your doctor before using these medicines. Aspirin should never be used in anyone under 18 years of age who is ill with a fever. It may cause severe liver damage.)  · Don't smoke. This can worsen symptoms.  Follow-up care  Follow up with your healthcare provider or our staff if you are not improving within the next week.  When to seek medical advice  Call your healthcare provider if any of these occur:  · Facial pain or headache becoming more severe  · Stiff neck  · Unusual drowsiness or confusion  · Swelling of the forehead or eyelids  · Vision problems, including blurred or double vision  · Fever of 100.4ºF (38ºC) or higher, or as directed by your healthcare provider  · Seizure  · Breathing problems  · Symptoms not resolving within 10 days  Date Last Reviewed: 4/13/2015  ©  0238-1872 [employer]. [address] [place] [address], [place] [address]. All rights reserved. This information is not intended as a substitute for professional medical care. Always follow your healthcare professional's instructions.

## 2020-01-08 NOTE — PATIENT INSTRUCTIONS
"Please follow up with your Primary care provider within 2-5 days if your signs and symptoms have not resolved or worsen.  The usual course of cold symptoms are 10-14 days.     If your condition worsens or fails to improve we recommend that you receive another evaluation at the emergency room immediately or contact your primary medical clinic to discuss your concerns.     You must understand that you have received an Urgent Care treatment only and that you may be released before all of your medical problems are known or treated.   You, the patient, will arrange for follow up care as instructed.     Tylenol or Ibuprofen can also be used as directed for pain/fever unless you have an allergy to them or medical condition such as stomach ulcers, kidney or liver disease or blood thinners etc for which you should not be taking these type of medications.     Take over the counter cough medication as directed as needed for cough.  You should avoid medications with pseudoephedrine or phenylephrine (any medication with "D") if you have high blood pressure as this can cause an elevation in your blood pressure. Instead consider Corcidin HBP as needed to prevent an elevated blood pressure.     Natural remedies of symptoms (as needed) include humidification, saline nasal sprays, and/or steamy showers.  Increase fluids, warm tea with honey, cough drops as needed.  You may also use salt water gargles for sore throat.    IF you received a steroid shot today - As discussed, this can elevate your blood pressure, elevate your blood sugar, water weight gain, nervous energy, redness to the face and dimpling of the skin at the injection site.       You have been given an antibiotic to treat your condition today.  Please complete the antibiotic as directed on the bottle.     As with any antibiotics, use probiotics and/or high culture yogurt about 2 hours apart from the antibiotic and about 1 week after the antibiotic to replace the gut bran " lost with antibiotic use.      If you are female and on BCP use additional methods to prevent pregnancy while on antibiotics and for one cycle after.       Sinusitis (Antibiotic Treatment)    The sinuses are air-filled spaces within the bones of the face. They connect to the inside of the nose. Sinusitis is an inflammation of the tissue lining the sinus cavity. Sinus inflammation can occur during a cold. It can also be due to allergies to pollens and other particles in the air. Sinusitis can cause symptoms of sinus congestion and fullness. A sinus infection causes fever, headache and facial pain. There is often green or yellow drainage from the nose or into the back of the throat (post-nasal drip). You have been given antibiotics to treat this condition.  Home care:  · Take the full course of antibiotics as instructed. Do not stop taking them, even if you feel better.  · Drink plenty of water, hot tea, and other liquids. This may help thin mucus. It also may promote sinus drainage.  · Heat may help soothe painful areas of the face. Use a towel soaked in hot water. Or,  the shower and direct the hot spray onto your face. Using a vaporizer along with a menthol rub at night may also help.   · An expectorant containing guaifenesin may help thin the mucus and promote drainage from the sinuses.  · Over-the-counter decongestants may be used unless a similar medicine was prescribed. Nasal sprays work the fastest. Use one that contains phenylephrine or oxymetazoline. First blow the nose gently. Then use the spray. Do not use these medicines more often than directed on the label or symptoms may get worse. You may also use tablets containing pseudoephedrine. Avoid products that combine ingredients, because side effects may be increased. Read labels. You can also ask the pharmacist for help. (NOTE: Persons with high blood pressure should not use decongestants. They can raise blood  pressure.)  · Over-the-counter antihistamines may help if allergies contributed to your sinusitis.    · Do not use nasal rinses or irrigation during an acute sinus infection, unless told to by your health care provider. Rinsing may spread the infection to other sinuses.  · Use acetaminophen or ibuprofen to control pain, unless another pain medicine was prescribed. (If you have chronic liver or kidney disease or ever had a stomach ulcer, talk with your doctor before using these medicines. Aspirin should never be used in anyone under 18 years of age who is ill with a fever. It may cause severe liver damage.)  · Don't smoke. This can worsen symptoms.  Follow-up care  Follow up with your healthcare provider or our staff if you are not improving within the next week.  When to seek medical advice  Call your healthcare provider if any of these occur:  · Facial pain or headache becoming more severe  · Stiff neck  · Unusual drowsiness or confusion  · Swelling of the forehead or eyelids  · Vision problems, including blurred or double vision  · Fever of 100.4ºF (38ºC) or higher, or as directed by your healthcare provider  · Seizure  · Breathing problems  · Symptoms not resolving within 10 days  Date Last Reviewed: 4/13/2015  © 8681-9889 [employer]. [address] [place] [address], [place] [address]. All rights reserved. This information is not intended as a substitute for professional medical care. Always follow your healthcare professional's instructions.

## 2020-01-13 ENCOUNTER — PATIENT MESSAGE (OUTPATIENT)
Dept: FAMILY MEDICINE | Facility: CLINIC | Age: 50
End: 2020-01-13

## 2020-01-31 DIAGNOSIS — M48.061 SPINAL STENOSIS OF LUMBAR REGION, UNSPECIFIED WHETHER NEUROGENIC CLAUDICATION PRESENT: Primary | ICD-10-CM

## 2020-04-21 DIAGNOSIS — Z01.84 ANTIBODY RESPONSE EXAMINATION: ICD-10-CM

## 2020-05-21 DIAGNOSIS — Z01.84 ANTIBODY RESPONSE EXAMINATION: ICD-10-CM

## 2020-06-20 DIAGNOSIS — Z01.84 ANTIBODY RESPONSE EXAMINATION: ICD-10-CM

## 2020-07-20 DIAGNOSIS — Z01.84 ANTIBODY RESPONSE EXAMINATION: ICD-10-CM

## 2020-08-14 DIAGNOSIS — Z11.59 NEED FOR HEPATITIS C SCREENING TEST: ICD-10-CM

## 2020-08-19 DIAGNOSIS — Z01.84 ANTIBODY RESPONSE EXAMINATION: ICD-10-CM

## 2020-09-18 DIAGNOSIS — Z01.84 ANTIBODY RESPONSE EXAMINATION: ICD-10-CM

## 2020-09-26 RX ORDER — BUPROPION HYDROCHLORIDE 150 MG/1
150 TABLET ORAL DAILY
Qty: 90 TABLET | Refills: 0 | Status: SHIPPED | OUTPATIENT
Start: 2020-09-26 | End: 2022-01-24 | Stop reason: ALTCHOICE

## 2020-09-28 DIAGNOSIS — G44.229 CHRONIC TENSION-TYPE HEADACHE, NOT INTRACTABLE: ICD-10-CM

## 2020-09-28 DIAGNOSIS — G43.709 CHRONIC MIGRAINE WITHOUT AURA WITHOUT STATUS MIGRAINOSUS, NOT INTRACTABLE: ICD-10-CM

## 2020-09-29 DIAGNOSIS — G43.709 CHRONIC MIGRAINE WITHOUT AURA WITHOUT STATUS MIGRAINOSUS, NOT INTRACTABLE: ICD-10-CM

## 2020-09-29 DIAGNOSIS — G44.229 CHRONIC TENSION-TYPE HEADACHE, NOT INTRACTABLE: ICD-10-CM

## 2020-09-29 NOTE — TELEPHONE ENCOUNTER
----- Message from Kasia Rodriguez sent at 9/29/2020  4:12 PM CDT -----  Regarding: self [phone]  .Type: RX Refill Request    Who Called:  self     Have you contacted your pharmacy: yes     Refill or New Rx: refill     RX Name and Strength: verapamil (VERELAN) 120 MG C24P    Is this a 30 day or 90 day RX: 30 day    Preferred Pharmacy with phone number: .  [id number] - [place], [place] - [address] [facility] [address]  [address] [facility] [address]  [place] [address]  Phone: [phone] Fax: [phone]    Local or Mail Order: local     Would the patient rather a call back or a response via [facility]?  Call back     Best Call Back Number [phone]

## 2020-09-30 RX ORDER — VERAPAMIL HYDROCHLORIDE 120 MG/1
120 CAPSULE, EXTENDED RELEASE ORAL DAILY
Qty: 90 CAPSULE | Refills: 12 | Status: SHIPPED | OUTPATIENT
Start: 2020-09-30 | End: 2023-08-22

## 2020-09-30 RX ORDER — VERAPAMIL HYDROCHLORIDE 120 MG/1
120 CAPSULE, EXTENDED RELEASE ORAL DAILY
Qty: 90 CAPSULE | Refills: 12 | Status: SHIPPED | OUTPATIENT
Start: 2020-09-30 | End: 2021-11-28

## 2020-10-18 DIAGNOSIS — Z01.84 ANTIBODY RESPONSE EXAMINATION: ICD-10-CM

## 2020-11-17 DIAGNOSIS — Z01.84 ANTIBODY RESPONSE EXAMINATION: ICD-10-CM

## 2021-01-03 ENCOUNTER — CLINICAL SUPPORT (OUTPATIENT)
Dept: URGENT CARE | Facility: CLINIC | Age: 51
End: 2021-01-03
Payer: COMMERCIAL

## 2021-01-03 ENCOUNTER — OFFICE VISIT (OUTPATIENT)
Dept: URGENT CARE | Facility: CLINIC | Age: 51
End: 2021-01-03
Payer: COMMERCIAL

## 2021-01-03 VITALS
TEMPERATURE: 99 F | BODY MASS INDEX: 33.32 KG/M2 | HEIGHT: 65 IN | WEIGHT: 200 LBS | TEMPERATURE: 99 F | RESPIRATION RATE: 16 BRPM | OXYGEN SATURATION: 98 % | DIASTOLIC BLOOD PRESSURE: 73 MMHG | SYSTOLIC BLOOD PRESSURE: 138 MMHG | HEART RATE: 72 BPM

## 2021-01-03 DIAGNOSIS — J02.9 PHARYNGITIS WITH VIRAL SYNDROME: Primary | ICD-10-CM

## 2021-01-03 DIAGNOSIS — R09.81 NASAL CONGESTION: ICD-10-CM

## 2021-01-03 DIAGNOSIS — J02.9 SORE THROAT: ICD-10-CM

## 2021-01-03 DIAGNOSIS — B34.9 PHARYNGITIS WITH VIRAL SYNDROME: Primary | ICD-10-CM

## 2021-01-03 DIAGNOSIS — J02.9 SORE THROAT: Primary | ICD-10-CM

## 2021-01-03 LAB
CTP QC/QA: YES
CTP QC/QA: YES
MOLECULAR STREP A: NEGATIVE
SARS-COV-2 RDRP RESP QL NAA+PROBE: NEGATIVE

## 2021-01-03 PROCEDURE — 87651 POCT STREP A MOLECULAR: ICD-10-PCS | Mod: QW,S$GLB,, | Performed by: NURSE PRACTITIONER

## 2021-01-03 PROCEDURE — 3008F BODY MASS INDEX DOCD: CPT | Mod: CPTII,S$GLB,, | Performed by: NURSE PRACTITIONER

## 2021-01-03 PROCEDURE — 99214 PR OFFICE/OUTPT VISIT, EST, LEVL IV, 30-39 MIN: ICD-10-PCS | Mod: S$GLB,,, | Performed by: NURSE PRACTITIONER

## 2021-01-03 PROCEDURE — U0002 COVID-19 LAB TEST NON-CDC: HCPCS | Mod: QW,S$GLB,, | Performed by: INTERNAL MEDICINE

## 2021-01-03 PROCEDURE — U0002: ICD-10-PCS | Mod: QW,S$GLB,, | Performed by: INTERNAL MEDICINE

## 2021-01-03 PROCEDURE — 99214 OFFICE O/P EST MOD 30 MIN: CPT | Mod: S$GLB,,, | Performed by: NURSE PRACTITIONER

## 2021-01-03 PROCEDURE — 87651 STREP A DNA AMP PROBE: CPT | Mod: QW,S$GLB,, | Performed by: NURSE PRACTITIONER

## 2021-01-03 PROCEDURE — 3008F PR BODY MASS INDEX (BMI) DOCUMENTED: ICD-10-PCS | Mod: CPTII,S$GLB,, | Performed by: NURSE PRACTITIONER

## 2021-01-04 ENCOUNTER — TELEPHONE (OUTPATIENT)
Dept: PRIMARY CARE CLINIC | Facility: CLINIC | Age: 51
End: 2021-01-04

## 2021-01-21 ENCOUNTER — HOSPITAL ENCOUNTER (OUTPATIENT)
Dept: RADIOLOGY | Facility: HOSPITAL | Age: 51
Discharge: HOME | End: 2021-01-21
Attending: OBSTETRICS & GYNECOLOGY
Payer: COMMERCIAL

## 2021-01-21 DIAGNOSIS — Z12.31 ENCOUNTER FOR SCREENING MAMMOGRAM FOR MALIGNANT NEOPLASM OF BREAST: ICD-10-CM

## 2021-01-21 PROCEDURE — 77067 SCR MAMMO BI INCL CAD: CPT | Mod: 26,,, | Performed by: RADIOLOGY

## 2021-01-21 PROCEDURE — 77067 SCR MAMMO BI INCL CAD: CPT | Mod: TC

## 2021-01-21 PROCEDURE — 77063 BREAST TOMOSYNTHESIS BI: CPT | Mod: 26,,, | Performed by: RADIOLOGY

## 2021-01-21 PROCEDURE — 77067 MAMMO DIGITAL SCREENING BILAT WITH TOMO: ICD-10-PCS | Mod: 26,,, | Performed by: RADIOLOGY

## 2021-01-21 PROCEDURE — 77063 MAMMO DIGITAL SCREENING BILAT WITH TOMO: ICD-10-PCS | Mod: 26,,, | Performed by: RADIOLOGY

## 2021-04-12 ENCOUNTER — PATIENT MESSAGE (OUTPATIENT)
Dept: ADMINISTRATIVE | Facility: HOSPITAL | Age: 51
End: 2021-04-12

## 2021-04-23 ENCOUNTER — DOCUMENTATION ONLY (OUTPATIENT)
Dept: BARIATRICS | Facility: CLINIC | Age: 51
End: 2021-04-23

## 2021-05-05 ENCOUNTER — APPOINTMENT (OUTPATIENT)
Dept: LAB | Facility: HOSPITAL | Age: 51
End: 2021-05-05
Attending: OBSTETRICS & GYNECOLOGY
Payer: COMMERCIAL

## 2021-05-05 DIAGNOSIS — N39.0 URINARY TRACT INFECTION, SITE NOT SPECIFIED: Primary | ICD-10-CM

## 2021-05-05 PROCEDURE — 87086 URINE CULTURE/COLONY COUNT: CPT | Performed by: OBSTETRICS & GYNECOLOGY

## 2021-05-07 LAB — BACTERIA UR CULT: NORMAL

## 2021-07-13 ENCOUNTER — PATIENT MESSAGE (OUTPATIENT)
Dept: FAMILY MEDICINE | Facility: CLINIC | Age: 51
End: 2021-07-13

## 2021-09-15 ENCOUNTER — IMMUNIZATION (OUTPATIENT)
Dept: OBSTETRICS AND GYNECOLOGY | Facility: CLINIC | Age: 51
End: 2021-09-15
Payer: COMMERCIAL

## 2021-09-15 DIAGNOSIS — Z23 NEED FOR VACCINATION: Primary | ICD-10-CM

## 2021-09-15 PROCEDURE — 91300 COVID-19, MRNA, LNP-S, PF, 30 MCG/0.3 ML DOSE VACCINE: ICD-10-PCS | Mod: ,,, | Performed by: FAMILY MEDICINE

## 2021-09-15 PROCEDURE — 91300 COVID-19, MRNA, LNP-S, PF, 30 MCG/0.3 ML DOSE VACCINE: CPT | Mod: ,,, | Performed by: FAMILY MEDICINE

## 2021-09-15 PROCEDURE — 0001A COVID-19, MRNA, LNP-S, PF, 30 MCG/0.3 ML DOSE VACCINE: CPT | Mod: CV19,,, | Performed by: FAMILY MEDICINE

## 2021-09-15 PROCEDURE — 0001A COVID-19, MRNA, LNP-S, PF, 30 MCG/0.3 ML DOSE VACCINE: ICD-10-PCS | Mod: CV19,,, | Performed by: FAMILY MEDICINE

## 2021-10-05 ENCOUNTER — IMMUNIZATION (OUTPATIENT)
Dept: OBSTETRICS AND GYNECOLOGY | Facility: CLINIC | Age: 51
End: 2021-10-05
Payer: COMMERCIAL

## 2021-10-05 DIAGNOSIS — Z23 NEED FOR VACCINATION: Primary | ICD-10-CM

## 2021-10-05 PROCEDURE — 91300 COVID-19, MRNA, LNP-S, PF, 30 MCG/0.3 ML DOSE VACCINE: CPT | Mod: PBBFAC | Performed by: FAMILY MEDICINE

## 2021-10-05 PROCEDURE — 0002A COVID-19, MRNA, LNP-S, PF, 30 MCG/0.3 ML DOSE VACCINE: CPT | Mod: PBBFAC | Performed by: FAMILY MEDICINE

## 2022-01-16 DIAGNOSIS — G43.709 CHRONIC MIGRAINE WITHOUT AURA WITHOUT STATUS MIGRAINOSUS, NOT INTRACTABLE: ICD-10-CM

## 2022-01-16 DIAGNOSIS — G44.229 CHRONIC TENSION-TYPE HEADACHE, NOT INTRACTABLE: ICD-10-CM

## 2022-01-16 NOTE — TELEPHONE ENCOUNTER
No new care gaps identified.  Powered by [employer]. Reference number: [id number].   1/16/2022 12:34:47 PM CST

## 2022-01-18 ENCOUNTER — DOCUMENTATION ONLY (OUTPATIENT)
Dept: FAMILY MEDICINE | Facility: CLINIC | Age: 52
End: 2022-01-18

## 2022-01-18 LAB
CTP QC/QA: YES
SARS-COV-2 AG RESP QL IA.RAPID: POSITIVE

## 2022-01-19 ENCOUNTER — PATIENT MESSAGE (OUTPATIENT)
Dept: PRIMARY CARE CLINIC | Facility: CLINIC | Age: 52
End: 2022-01-19
Payer: COMMERCIAL

## 2022-01-19 RX ORDER — VERAPAMIL HYDROCHLORIDE 120 MG/1
CAPSULE, EXTENDED RELEASE ORAL
Qty: 60 CAPSULE | Refills: 0 | Status: SHIPPED | OUTPATIENT
Start: 2022-01-19 | End: 2022-01-24

## 2022-01-24 ENCOUNTER — HOSPITAL ENCOUNTER (OUTPATIENT)
Dept: RADIOLOGY | Facility: HOSPITAL | Age: 52
Discharge: HOME | End: 2022-01-24
Attending: INTERNAL MEDICINE
Payer: COMMERCIAL

## 2022-01-24 ENCOUNTER — OFFICE VISIT (OUTPATIENT)
Dept: FAMILY MEDICINE | Facility: CLINIC | Age: 52
End: 2022-01-24
Payer: COMMERCIAL

## 2022-01-24 VITALS
DIASTOLIC BLOOD PRESSURE: 90 MMHG | OXYGEN SATURATION: 97 % | SYSTOLIC BLOOD PRESSURE: 132 MMHG | HEART RATE: 62 BPM | BODY MASS INDEX: 33.28 KG/M2 | TEMPERATURE: 98 F | HEIGHT: 65 IN

## 2022-01-24 DIAGNOSIS — U07.1 COVID-19 VIRUS INFECTION: ICD-10-CM

## 2022-01-24 DIAGNOSIS — B96.89 BACTERIAL RESPIRATORY INFECTION: ICD-10-CM

## 2022-01-24 DIAGNOSIS — J02.9 PHARYNGITIS, UNSPECIFIED ETIOLOGY: ICD-10-CM

## 2022-01-24 DIAGNOSIS — U07.1 COVID-19 VIRUS INFECTION: Primary | ICD-10-CM

## 2022-01-24 DIAGNOSIS — J98.8 BACTERIAL RESPIRATORY INFECTION: ICD-10-CM

## 2022-01-24 LAB
CTP QC/QA: YES
MOLECULAR STREP A: NEGATIVE

## 2022-01-24 PROCEDURE — 71046 X-RAY EXAM CHEST 2 VIEWS: CPT | Mod: TC,FY,PO

## 2022-01-24 PROCEDURE — 1159F PR MEDICATION LIST DOCUMENTED IN MEDICAL RECORD: ICD-10-PCS | Mod: CPTII,S$GLB,, | Performed by: INTERNAL MEDICINE

## 2022-01-24 PROCEDURE — 3080F DIAST BP >= 90 MM HG: CPT | Mod: CPTII,S$GLB,, | Performed by: INTERNAL MEDICINE

## 2022-01-24 PROCEDURE — 99999 PR PBB SHADOW E&M-EST. PATIENT-LVL III: ICD-10-PCS | Mod: PBBFAC,,, | Performed by: INTERNAL MEDICINE

## 2022-01-24 PROCEDURE — 1159F MED LIST DOCD IN RCRD: CPT | Mod: CPTII,S$GLB,, | Performed by: INTERNAL MEDICINE

## 2022-01-24 PROCEDURE — 87651 STREP A DNA AMP PROBE: CPT | Mod: QW,S$GLB,, | Performed by: INTERNAL MEDICINE

## 2022-01-24 PROCEDURE — 99214 PR OFFICE/OUTPT VISIT, EST, LEVL IV, 30-39 MIN: ICD-10-PCS | Mod: S$GLB,,, | Performed by: INTERNAL MEDICINE

## 2022-01-24 PROCEDURE — 1160F PR REVIEW ALL MEDS BY PRESCRIBER/CLIN PHARMACIST DOCUMENTED: ICD-10-PCS | Mod: CPTII,S$GLB,, | Performed by: INTERNAL MEDICINE

## 2022-01-24 PROCEDURE — 99999 PR PBB SHADOW E&M-EST. PATIENT-LVL III: CPT | Mod: PBBFAC,,, | Performed by: INTERNAL MEDICINE

## 2022-01-24 PROCEDURE — 3080F PR MOST RECENT DIASTOLIC BLOOD PRESSURE >= 90 MM HG: ICD-10-PCS | Mod: CPTII,S$GLB,, | Performed by: INTERNAL MEDICINE

## 2022-01-24 PROCEDURE — 1160F RVW MEDS BY RX/DR IN RCRD: CPT | Mod: CPTII,S$GLB,, | Performed by: INTERNAL MEDICINE

## 2022-01-24 PROCEDURE — 87651 POCT STREP A MOLECULAR: ICD-10-PCS | Mod: QW,S$GLB,, | Performed by: INTERNAL MEDICINE

## 2022-01-24 PROCEDURE — 71046 XR CHEST PA AND LATERAL: ICD-10-PCS | Mod: 26,,, | Performed by: RADIOLOGY

## 2022-01-24 PROCEDURE — 3008F PR BODY MASS INDEX (BMI) DOCUMENTED: ICD-10-PCS | Mod: CPTII,S$GLB,, | Performed by: INTERNAL MEDICINE

## 2022-01-24 PROCEDURE — 3075F PR MOST RECENT SYSTOLIC BLOOD PRESS GE 130-139MM HG: ICD-10-PCS | Mod: CPTII,S$GLB,, | Performed by: INTERNAL MEDICINE

## 2022-01-24 PROCEDURE — 3008F BODY MASS INDEX DOCD: CPT | Mod: CPTII,S$GLB,, | Performed by: INTERNAL MEDICINE

## 2022-01-24 PROCEDURE — 71046 X-RAY EXAM CHEST 2 VIEWS: CPT | Mod: 26,,, | Performed by: RADIOLOGY

## 2022-01-24 PROCEDURE — 3075F SYST BP GE 130 - 139MM HG: CPT | Mod: CPTII,S$GLB,, | Performed by: INTERNAL MEDICINE

## 2022-01-24 PROCEDURE — 99214 OFFICE O/P EST MOD 30 MIN: CPT | Mod: S$GLB,,, | Performed by: INTERNAL MEDICINE

## 2022-01-24 RX ORDER — ALBUTEROL SULFATE 90 UG/1
2 AEROSOL, METERED RESPIRATORY (INHALATION) EVERY 6 HOURS PRN
Qty: 18 G | Refills: 0 | Status: SHIPPED | OUTPATIENT
Start: 2022-01-24 | End: 2023-05-08 | Stop reason: ALTCHOICE

## 2022-01-24 RX ORDER — BENZONATATE 100 MG/1
100 CAPSULE ORAL 3 TIMES DAILY PRN
Qty: 30 CAPSULE | Refills: 0 | Status: SHIPPED | OUTPATIENT
Start: 2022-01-24 | End: 2022-02-03

## 2022-01-24 RX ORDER — AZITHROMYCIN 250 MG/1
TABLET, FILM COATED ORAL
Qty: 6 TABLET | Refills: 0 | Status: SHIPPED | OUTPATIENT
Start: 2022-01-24 | End: 2023-03-18 | Stop reason: ALTCHOICE

## 2022-01-24 NOTE — LETTER
[address] [facility] ? [place] [address] ? Phone [phone] ? Fax [phone]           Return to Work/School    Patient: Jennifer Bonilla  YOB: 1970   Date: 01/24/2022      To Whom It May Concern:     Jennifer Bonilla was in contact with/seen in my office on 01/24/2022. COVID-19 is present in our communities across the [facility]. Not all patients are eligible or appropriate to be tested. In this situation, your employee meets the following criteria:     Jennifer Bonilla has met the criteria for COVID-19 testing and has a POSITIVE result. She can return to work once they are asymptomatic for 24 hours without the use of fever reducing medications AND at least five days from the start of symptoms (or from the first positive result if they have no symptoms).      Patient was seen and evaluated today 1/24/2022, with continued significant symptoms, and is NOT cleared to return to work at this time.    If you have any questions or concerns, or if I can be of further assistance, please do not hesitate to contact me.     Sincerely,      Raymundo Epperson MD

## 2022-01-24 NOTE — PROGRESS NOTES
No acute cardiopulmonary disease.  Allowing for poor inspiration, there does not appear to have been any significant change in the cardiopulmonary status.    Patient with continued symptoms likely due to COVID.  Strep negative.  Results to patient via my chart.  Supportive care with albuterol and benzonatate.  Zithromax if no improvement by later this week.

## 2022-01-24 NOTE — PROGRESS NOTES
This note was created by combination of typed  and M-Modal dictation.  Transcription errors may be present.  If there are any questions, please contact me.    Assessment and Plan:   COVID-19 virus infection  Pharyngitis, unspecified etiology  Bacterial respiratory infection  -I think unlikely to be flu, it's been 7 days so if positive would not   Will check for strep for pssible superimposed strep infection?  Otherwise I think this is still symptoms of covid  Check CXR unless focal infiltrate would treat as covid so benzonatate and albuterol  z lisandro printed in case no improvement by later this week  Note for work - remains symptomatic, out of work; no return to work date  -     X-Ray Chest PA And Lateral; Future; Expected date: 01/24/2022  -     albuterol (VENTOLIN HFA) 90 mcg/actuation inhaler; Inhale 2 puffs into the lungs every 6 (six) hours as needed for Wheezing. Rescue  Dispense: 18 g; Refill: 0  -     benzonatate (TESSALON) 100 MG capsule; Take 1 capsule (100 mg total) by mouth 3 (three) times daily as needed for Cough.  Dispense: 30 capsule; Refill: 0  -     POCT Strep A, Molecular  -     azithromycin (ZITHROMAX Z-LISANDRO) 250 MG tablet; Take 2 tablets (500 mg) on  Day 1,  followed by 1 tablet (250 mg) once daily on Days 2 through 5.  Dispense: 6 tablet; Refill: 0      Medications Discontinued During This Encounter   Medication Reason    verapamiL (VERELAN) 120 MG C24P     azithromycin (ZITHROMAX Z-LISANDRO) 250 MG tablet     (Magic mouthwash) 1:1:1 Benadryl 12.5mg/5ml liq, aluminum & magnesium hydroxide-simehticone (Maalox), lidocaine viscous 2%     buPROPion (WELLBUTRIN XL) 150 MG TB24 tablet Therapy completed       meds sent this encounter:       Follow Up: No follow-ups on file.    Subjective:     Chief Complaint   Patient presents with    Fever    Sore Throat    Fatigue    Shortness of Breath    Generalized Body Aches    no appetite       JEREL Rodriguez is a 51 y.o. female, last  appointment with this clinic was Visit date not found.  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        No LMP recorded. Patient has had an ablation.    Pt of [place]  covid positive 1/18  Verapamil for migraine prophy    Flu shot thur 1/13  Sx started Tue 1/18  covid positive tue  Continued symptoms  Bones achy; fever; fatigue; throat  Appetite is normal  Still with low grade temp yesterday 100.9  Sweating.    and son both positive but they got better very quickly.   Tylenol and advil.   Work is calling her asking her to return to work  Very fatigued, all she could do to get dressed to come in for eval today  Throat very painful  Cough occasionally productive.    Stopped the bupropion. Was not helping.     Patient Care Team:  Popeye Vale MD as PCP - General (Internal Medicine)    Patient Active Problem List    Diagnosis Date Noted    Refractive error 10/31/2019    Family history of colon cancer 07/22/2019    History of colonic polyps 07/22/2019    Mood disorder 07/22/2019    Post-menopausal 07/22/2019    GERD (gastroesophageal reflux disease) 01/15/2019    Internal derangement of knee 08/16/2018    Chronic migraine w/o aura w/o status migrainosus, not intractable 03/21/2017     Verapamil 120mg  Good control  3 per week, 12 headaches per month    Discussed Aimovig - deferred for now    Tried and failed Topamax, Verapamil, Elavil      TMJ (temporomandibular joint syndrome) 04/14/2015    Interstitial cystitis 09/22/2014    RBBB 05/09/2014    Pharyngitis 04/11/2014    Palpitations 04/01/2014    Menorrhagia 08/19/2013  "      PAST MEDICAL PROBLEMS, PAST SURGICAL HISTORY: please see relevant portions of the electronic medical record    ALLERGIES AND MEDICATIONS: updated and reviewed.  Review of patient's allergies indicates:   Allergen Reactions    Capsicum Blisters     Topical - caused blisters    Codeine Anaphylaxis    Zosyn [piperacillin-tazobactam] Anaphylaxis       Medication List with Changes/Refills   Current Medications    BUPROPION (WELLBUTRIN XL) 150 MG TB24 TABLET    Take 1 tablet (150 mg total) by mouth once daily.    VERAPAMIL (VERELAN) 120 MG C24P    Take 1 capsule (120 mg total) by mouth once daily.   Discontinued Medications    (MAGIC MOUTHWASH) 1:1:1 BENADRYL 12.5MG/5ML LIQ, ALUMINUM & MAGNESIUM HYDROXIDE-SIMEHTICONE (MAALOX), LIDOCAINE VISCOUS 2%    Swish and spit 10 mLs every 4 (four) hours as needed. Gargle and spit for sore throat    AZITHROMYCIN (ZITHROMAX Z-LISANDRO) 250 MG TABLET    Take 2 tablets (500 mg) on  Day 1,  followed by 1 tablet (250 mg) once daily on Days 2 through 5.    VERAPAMIL (VERELAN) 120 MG C24P    Take 1 capsule by mouth once daily        Objective:   Physical Exam  Vitals:    01/24/22 1100   BP: (!) 132/90   BP Location: Right arm   Patient Position: Sitting   BP Method: Large (Manual)   Pulse: 62   Temp: 98.3 °F (36.8 °C)   TempSrc: Oral   SpO2: 97%   Height: 5' 5" (1.651 m)    Body mass index is 33.28 kg/m².      Height: 5' 5" (165.1 cm)     Physical Exam  Constitutional:       Appearance: She is well-developed.   HENT:      Head: Normocephalic.      Right Ear: Tympanic membrane and ear canal normal. There is no impacted cerumen.      Left Ear: Tympanic membrane and ear canal normal. There is no impacted cerumen.      Mouth/Throat:      Pharynx: Posterior oropharyngeal erythema present. No oropharyngeal exudate.   Eyes:      General: No scleral icterus.        Right eye: No discharge.         Left eye: No discharge.   Cardiovascular:      Rate and Rhythm: Normal rate and regular rhythm.    "   Heart sounds: Normal heart sounds.   Pulmonary:      Effort: Pulmonary effort is normal.      Breath sounds: Normal breath sounds. No wheezing.   Musculoskeletal:      Cervical back: Neck supple.   Lymphadenopathy:      Cervical: No cervical adenopathy.   Skin:     General: Skin is warm and dry.   Neurological:      Mental Status: She is alert and oriented to person, place, and time.   Psychiatric:         Behavior: Behavior normal.       Strep negative

## 2022-01-28 ENCOUNTER — PATIENT MESSAGE (OUTPATIENT)
Dept: FAMILY MEDICINE | Facility: CLINIC | Age: 52
End: 2022-01-28
Payer: COMMERCIAL

## 2022-01-28 ENCOUNTER — TELEPHONE (OUTPATIENT)
Dept: FAMILY MEDICINE | Facility: CLINIC | Age: 52
End: 2022-01-28
Payer: COMMERCIAL

## 2022-01-28 NOTE — TELEPHONE ENCOUNTER
Note is okay and what ever form necessary.  If there is something for me to sign in my chart, not sure where that would be      okay to give a work note with nurse signature

## 2022-01-28 NOTE — TELEPHONE ENCOUNTER
----- Message from Kasia Michael sent at 1/28/2022  8:25 AM CST -----  Regarding: self [phone]  .Type: Patient Call Back    Who called: self     What is the request in detail: Pt is requesting a return to work letter to return on Monday 01-3. Pt stated that she's doing better w/o fever but still having a slight cough. Please place note in my chart    Can the clinic reply by [facility]? Call back     Would the patient rather a call back or a response via [facility]? Call back     Best call back number: .[phone]

## 2022-06-10 ENCOUNTER — HOSPITAL ENCOUNTER (OUTPATIENT)
Dept: RADIOLOGY | Facility: HOSPITAL | Age: 52
Discharge: HOME | End: 2022-06-10
Attending: OBSTETRICS & GYNECOLOGY
Payer: COMMERCIAL

## 2022-06-10 VITALS — WEIGHT: 200 LBS | HEIGHT: 65 IN | BODY MASS INDEX: 33.32 KG/M2

## 2022-06-10 DIAGNOSIS — Z12.31 VISIT FOR SCREENING MAMMOGRAM: ICD-10-CM

## 2022-06-10 PROCEDURE — 77063 BREAST TOMOSYNTHESIS BI: CPT | Mod: 26,,, | Performed by: RADIOLOGY

## 2022-06-10 PROCEDURE — 77063 BREAST TOMOSYNTHESIS BI: CPT | Mod: TC

## 2022-06-10 PROCEDURE — 77067 MAMMO DIGITAL SCREENING BILAT WITH TOMO: ICD-10-PCS | Mod: 26,,, | Performed by: RADIOLOGY

## 2022-06-10 PROCEDURE — 77067 SCR MAMMO BI INCL CAD: CPT | Mod: 26,,, | Performed by: RADIOLOGY

## 2022-06-10 PROCEDURE — 77063 MAMMO DIGITAL SCREENING BILAT WITH TOMO: ICD-10-PCS | Mod: 26,,, | Performed by: RADIOLOGY

## 2022-06-10 PROCEDURE — 77067 SCR MAMMO BI INCL CAD: CPT | Mod: TC

## 2023-01-17 ENCOUNTER — TELEPHONE (OUTPATIENT)
Dept: FAMILY MEDICINE | Facility: CLINIC | Age: 53
End: 2023-01-17
Payer: COMMERCIAL

## 2023-01-17 NOTE — TELEPHONE ENCOUNTER
----- Message from Hakeem King sent at 1/17/2023  3:06 PM CST -----  Regarding: Self [phone]  Type:  Patient Requesting Referral    Who Called: Self    Referral to What Specialty:  Gastro    Reason for Referral: Pt. Has rectal bleeding.    Would the patient rather a call back or a response via [facility]?  Call back    Best Call Back Number: [phone]    Additional Information: Her decision tree only shows colon and rectal surgery.

## 2023-01-18 ENCOUNTER — TELEPHONE (OUTPATIENT)
Dept: SURGERY | Facility: CLINIC | Age: 53
End: 2023-01-18
Payer: COMMERCIAL

## 2023-03-18 ENCOUNTER — OFFICE VISIT (OUTPATIENT)
Dept: URGENT CARE | Facility: CLINIC | Age: 53
End: 2023-03-18
Payer: COMMERCIAL

## 2023-03-18 VITALS
HEIGHT: 65 IN | OXYGEN SATURATION: 96 % | BODY MASS INDEX: 33.32 KG/M2 | HEART RATE: 68 BPM | WEIGHT: 200 LBS | TEMPERATURE: 98 F | SYSTOLIC BLOOD PRESSURE: 144 MMHG | DIASTOLIC BLOOD PRESSURE: 88 MMHG | RESPIRATION RATE: 16 BRPM

## 2023-03-18 DIAGNOSIS — R05.9 COUGH, UNSPECIFIED TYPE: ICD-10-CM

## 2023-03-18 DIAGNOSIS — J06.9 URI WITH COUGH AND CONGESTION: Primary | ICD-10-CM

## 2023-03-18 LAB
CTP QC/QA: YES
SARS-COV-2 AG RESP QL IA.RAPID: NEGATIVE

## 2023-03-18 PROCEDURE — 99213 OFFICE O/P EST LOW 20 MIN: CPT | Mod: S$GLB,,, | Performed by: PHYSICIAN ASSISTANT

## 2023-03-18 PROCEDURE — 99213 PR OFFICE/OUTPT VISIT, EST, LEVL III, 20-29 MIN: ICD-10-PCS | Mod: S$GLB,,, | Performed by: PHYSICIAN ASSISTANT

## 2023-03-18 PROCEDURE — 87811 SARS CORONAVIRUS 2 ANTIGEN POCT, MANUAL READ: ICD-10-PCS | Mod: QW,S$GLB,, | Performed by: PHYSICIAN ASSISTANT

## 2023-03-18 PROCEDURE — 87811 SARS-COV-2 COVID19 W/OPTIC: CPT | Mod: QW,S$GLB,, | Performed by: PHYSICIAN ASSISTANT

## 2023-03-18 RX ORDER — PROMETHAZINE HYDROCHLORIDE AND DEXTROMETHORPHAN HYDROBROMIDE 6.25; 15 MG/5ML; MG/5ML
5 SYRUP ORAL EVERY 4 HOURS PRN
Qty: 118 ML | Refills: 0 | Status: SHIPPED | OUTPATIENT
Start: 2023-03-18 | End: 2023-03-28

## 2023-03-18 RX ORDER — PREDNISONE 20 MG/1
20 TABLET ORAL DAILY
Qty: 4 TABLET | Refills: 0 | Status: SHIPPED | OUTPATIENT
Start: 2023-03-18 | End: 2023-03-22

## 2023-03-18 NOTE — PROGRESS NOTES
"Subjective:       Patient ID: Jennifer Bonilla is a 52 y.o. female.    Vitals:  height is 5' 5" (1.651 m) and weight is 90.7 kg (200 lb). Her temperature is 98 °F (36.7 °C). Her blood pressure is 144/88 (abnormal) and her pulse is 68. Her respiration is 16 and oxygen saturation is 96%.     Chief Complaint: Cough    Patient provider note starts here:  Patient presents with a 5 day history of URI like symptoms including nasal congestion, rhinorrhea, sore throat, productive cough.  She denies any documented fevers, chest pain or shortness of breath.  Two days after she develop symptoms, her  started with symptoms as well.  Reports she is been taking Mucinex which has helped her break up the congestion and has since turned her cough into a productive one.  Reports she is taking for home COVID test which have all been negative.    Cough  This is a new problem. The current episode started in the past 7 days (5 days). The problem has been gradually worsening. The problem occurs every few minutes. The cough is Productive of sputum. Associated symptoms include nasal congestion, postnasal drip and a sore throat. Pertinent negatives include no chest pain, ear congestion, ear pain, fever, headaches, shortness of breath or wheezing. Associated symptoms comments: Fatigue . Nothing aggravates the symptoms. She has tried OTC cough suppressant (mucinex, night quil) for the symptoms. The treatment provided no relief. There is no history of asthma, bronchitis, COPD or pneumonia.     Constitution: Positive for fatigue. Negative for fever.   HENT:  Positive for congestion, postnasal drip and sore throat. Negative for ear pain.    Neck: Negative for neck pain.   Cardiovascular:  Negative for chest pain, palpitations and sob on exertion.   Respiratory:  Positive for cough and sputum production. Negative for chest tightness, shortness of breath and wheezing.    Gastrointestinal:  Negative for abdominal pain, vomiting and " diarrhea.   Skin:  Negative for color change and wound.   Neurological:  Negative for headaches, numbness and tingling.     Objective:      Physical Exam   Constitutional: She is oriented to person, place, and time. She appears well-developed. She is cooperative.  Non-toxic appearance. She appears ill (Patient appears to not feel well). No distress.   HENT:   Head: Normocephalic and atraumatic.   Ears:   Right Ear: Hearing, tympanic membrane, external ear and ear canal normal.   Left Ear: Hearing, tympanic membrane, external ear and ear canal normal.   Nose: Congestion present. No mucosal edema, rhinorrhea or nasal deformity. No epistaxis. Right sinus exhibits no maxillary sinus tenderness and no frontal sinus tenderness. Left sinus exhibits no maxillary sinus tenderness and no frontal sinus tenderness.   Mouth/Throat: Uvula is midline and mucous membranes are normal. No trismus in the jaw. Normal dentition. No uvula swelling. Posterior oropharyngeal erythema present. No oropharyngeal exudate or posterior oropharyngeal edema.   Eyes: Conjunctivae and lids are normal. No scleral icterus.   Neck: Trachea normal and phonation normal. Neck supple. No edema present. No erythema present. No neck rigidity present.   Cardiovascular: Normal rate, regular rhythm, normal heart sounds and normal pulses.   Pulmonary/Chest: Effort normal and breath sounds normal. No respiratory distress. She has no decreased breath sounds. She has no wheezes. She has no rhonchi.         Comments: Dry cough noted on exam      Abdominal: Normal appearance.   Musculoskeletal: Normal range of motion.         General: No deformity. Normal range of motion.   Lymphadenopathy:     She has cervical adenopathy.   Neurological: She is alert and oriented to person, place, and time. She exhibits normal muscle tone. Coordination normal.   Skin: Skin is warm, dry, intact, not diaphoretic and not pale.   Psychiatric: Her speech is normal and behavior is normal.  Judgment and thought content normal.   Nursing note and vitals reviewed.      Assessment:       1. URI with cough and congestion    2. Cough, unspecified type        Results for orders placed or performed in visit on 03/18/23   SARS Coronavirus 2 Antigen, POCT Manual Read   Result Value Ref Range    SARS Coronavirus 2 Antigen Negative Negative     Acceptable Yes        Plan:         URI with cough and congestion  -     promethazine-dextromethorphan (PROMETHAZINE-DM) 6.25-15 mg/5 mL Syrp; Take 5 mLs by mouth every 4 (four) hours as needed (Cough).  Dispense: 118 mL; Refill: 0  -     predniSONE (DELTASONE) 20 MG tablet; Take 1 tablet (20 mg total) by mouth once daily. for 4 days  Dispense: 4 tablet; Refill: 0    Cough, unspecified type  -     SARS Coronavirus 2 Antigen, POCT Manual Read           Medical Decision Making:   History:   Old Medical Records: I decided to obtain old medical records.  Differential Diagnosis:   Differential diagnosis includes but is not limited to: viral vs bacterial URI, pharyngitis, otitis, COVID 19, influenza, pneumonia.    Clinical Tests:   Lab Tests: Ordered and Reviewed       <> Summary of Lab: COVID negative  Urgent Care Management:  I have reviewed past medical records including labs and imaging to evaluate for trends and previous treatments for related symptoms.   Patient presents to the clinic with complaints of URI like symptoms for the past 5 days.  On exam, she is afebrile and nontoxic in appearance.  Lungs are clear to auscultation bilaterally and vital signs are stable.  COVID is negative.  Exam findings are most consistent with viral etiology.  Patient's  has similar symptoms as well.  I prescribed symptomatic treatment and encouraged close follow-up with primary care.  Should symptoms persist in another 5 days, patient asked to call the clinic and may discuss need for abx at that time. ED precautions discussed.  She verbalized understanding and agreed  with plan.       Patient Instructions   You have been prescribed a steroid today. Take the prescription as directed. Steroids can increase blood sugar. You can also have the following when taking steroids: flushing, jitteriness, weight gain, fluid retention, bone weakening. If you develop any adverse symptoms, stop taking the medication immediately.    You must understand that you've received an Urgent Care treatment only and that you may be released before all your medical problems are known or treated. You, the patient, will arrange for follow up care as instructed.      Follow up with your PCP or specialty clinic as instructed in the next 2-3 days if not improved or as needed. You can call [phone] to schedule an appointment with appropriate provider.      If you condition worsens, we recommend that you receive another evaluation at the emergency room immediately or contact your primary medical clinic's after hours call service to discuss your concerns.      Please return here or go to the Emergency Department for any concerns or worsening condition.      If you were prescribed a narcotic or controlled substance, do not drive or operate heavy equipment or machinery while taking these medications.

## 2023-03-18 NOTE — PATIENT INSTRUCTIONS
You have been prescribed a steroid today. Take the prescription as directed. Steroids can increase blood sugar. You can also have the following when taking steroids: flushing, jitteriness, weight gain, fluid retention, bone weakening. If you develop any adverse symptoms, stop taking the medication immediately.    You must understand that you've received an Urgent Care treatment only and that you may be released before all your medical problems are known or treated. You, the patient, will arrange for follow up care as instructed.      Follow up with your PCP or specialty clinic as instructed in the next 2-3 days if not improved or as needed. You can call [phone] to schedule an appointment with appropriate provider.      If you condition worsens, we recommend that you receive another evaluation at the emergency room immediately or contact your primary medical clinic's after hours call service to discuss your concerns.      Please return here or go to the Emergency Department for any concerns or worsening condition.      If you were prescribed a narcotic or controlled substance, do not drive or operate heavy equipment or machinery while taking these medications.

## 2023-03-23 ENCOUNTER — TELEPHONE (OUTPATIENT)
Dept: URGENT CARE | Facility: CLINIC | Age: 53
End: 2023-03-23
Payer: COMMERCIAL

## 2023-03-23 DIAGNOSIS — J06.9 BACTERIAL URI: Primary | ICD-10-CM

## 2023-03-23 DIAGNOSIS — B96.89 BACTERIAL URI: Primary | ICD-10-CM

## 2023-03-23 RX ORDER — AMOXICILLIN AND CLAVULANATE POTASSIUM 875; 125 MG/1; MG/1
1 TABLET, FILM COATED ORAL EVERY 12 HOURS
Qty: 14 TABLET | Refills: 0 | Status: SHIPPED | OUTPATIENT
Start: 2023-03-23 | End: 2023-03-30

## 2023-03-23 NOTE — TELEPHONE ENCOUNTER
Patient called the clinic stating that she is not feeling any better and continues to have cough productive of sputum in addition to maxillary sinus pressure and pain with green nasal discharge. Due to the length since the onset of symptoms, will send in course of abx. I had discussed this with patient at the time of her clinic visit with me.

## 2023-05-08 ENCOUNTER — OFFICE VISIT (OUTPATIENT)
Dept: FAMILY MEDICINE | Facility: CLINIC | Age: 53
End: 2023-05-08
Payer: COMMERCIAL

## 2023-05-08 VITALS
HEART RATE: 82 BPM | BODY MASS INDEX: 37.39 KG/M2 | SYSTOLIC BLOOD PRESSURE: 145 MMHG | TEMPERATURE: 99 F | WEIGHT: 224.44 LBS | HEIGHT: 65 IN | OXYGEN SATURATION: 96 % | DIASTOLIC BLOOD PRESSURE: 76 MMHG

## 2023-05-08 DIAGNOSIS — Z11.4 ENCOUNTER FOR SCREENING FOR HIV: ICD-10-CM

## 2023-05-08 DIAGNOSIS — I10 BENIGN HYPERTENSION: Chronic | ICD-10-CM

## 2023-05-08 DIAGNOSIS — G43.709 CHRONIC MIGRAINE W/O AURA W/O STATUS MIGRAINOSUS, NOT INTRACTABLE: ICD-10-CM

## 2023-05-08 DIAGNOSIS — Z11.59 NEED FOR HEPATITIS C SCREENING TEST: ICD-10-CM

## 2023-05-08 DIAGNOSIS — Z13.220 ENCOUNTER FOR SCREENING FOR LIPID DISORDER: ICD-10-CM

## 2023-05-08 DIAGNOSIS — R29.818 SUSPECTED SLEEP APNEA: ICD-10-CM

## 2023-05-08 DIAGNOSIS — Z12.31 ENCOUNTER FOR SCREENING MAMMOGRAM FOR MALIGNANT NEOPLASM OF BREAST: ICD-10-CM

## 2023-05-08 DIAGNOSIS — E66.01 CLASS 2 SEVERE OBESITY DUE TO EXCESS CALORIES WITH SERIOUS COMORBIDITY AND BODY MASS INDEX (BMI) OF 37.0 TO 37.9 IN ADULT: ICD-10-CM

## 2023-05-08 DIAGNOSIS — Z00.00 GENERAL MEDICAL EXAM: Primary | ICD-10-CM

## 2023-05-08 PROCEDURE — 3077F SYST BP >= 140 MM HG: CPT | Mod: CPTII,S$GLB,, | Performed by: FAMILY MEDICINE

## 2023-05-08 PROCEDURE — 3008F BODY MASS INDEX DOCD: CPT | Mod: CPTII,S$GLB,, | Performed by: FAMILY MEDICINE

## 2023-05-08 PROCEDURE — 99396 PREV VISIT EST AGE 40-64: CPT | Mod: S$GLB,,, | Performed by: FAMILY MEDICINE

## 2023-05-08 PROCEDURE — 3044F HG A1C LEVEL LT 7.0%: CPT | Mod: CPTII,S$GLB,, | Performed by: FAMILY MEDICINE

## 2023-05-08 PROCEDURE — 3077F PR MOST RECENT SYSTOLIC BLOOD PRESSURE >= 140 MM HG: ICD-10-PCS | Mod: CPTII,S$GLB,, | Performed by: FAMILY MEDICINE

## 2023-05-08 PROCEDURE — 3044F PR MOST RECENT HEMOGLOBIN A1C LEVEL <7.0%: ICD-10-PCS | Mod: CPTII,S$GLB,, | Performed by: FAMILY MEDICINE

## 2023-05-08 PROCEDURE — 3008F PR BODY MASS INDEX (BMI) DOCUMENTED: ICD-10-PCS | Mod: CPTII,S$GLB,, | Performed by: FAMILY MEDICINE

## 2023-05-08 PROCEDURE — 99396 PR PREVENTIVE VISIT,EST,40-64: ICD-10-PCS | Mod: S$GLB,,, | Performed by: FAMILY MEDICINE

## 2023-05-08 PROCEDURE — 99999 PR PBB SHADOW E&M-EST. PATIENT-LVL V: CPT | Mod: PBBFAC,,, | Performed by: FAMILY MEDICINE

## 2023-05-08 PROCEDURE — 3078F PR MOST RECENT DIASTOLIC BLOOD PRESSURE < 80 MM HG: ICD-10-PCS | Mod: CPTII,S$GLB,, | Performed by: FAMILY MEDICINE

## 2023-05-08 PROCEDURE — 3078F DIAST BP <80 MM HG: CPT | Mod: CPTII,S$GLB,, | Performed by: FAMILY MEDICINE

## 2023-05-08 PROCEDURE — 99999 PR PBB SHADOW E&M-EST. PATIENT-LVL V: ICD-10-PCS | Mod: PBBFAC,,, | Performed by: FAMILY MEDICINE

## 2023-05-08 NOTE — ASSESSMENT & PLAN NOTE
The patient has multiple symptoms of/ risk factors for sleep apnea. The patient was strongly encouraged to make an appointment with sleep medicine, for further evaluation/testing.    Discussed with patient that sleep apnea can cause and is associated with multiple problems such as daytime tiredness, memory/concentration problems, morning headaches, acid reflux, mood swings or feelings of low mood, accidents when driving, sexual dysfunction, difficulty losing weight, elevated blood sugar/insulin resistance, elevated blood pressure/hypertension, increased urination at night, among other problems.  In addition I explained that if left untreated, sleep apnea can result in irregular heartbeat, including atrial fibrillation, heart failure, myocardial infarctions/heart attacks, stroke, and early death.

## 2023-05-08 NOTE — PROGRESS NOTES
"  Patient Name: Jennifer Bonilla    : 1970  MRN: [id number]      Subjective:     Patient ID: Jennifer is a 52 y.o. female    Chief Complaint:  Annual Exam    52-year-old female with chronic migraines comes in for annual examination.       Review of Systems   Constitutional:  Positive for activity change and fever. Negative for unexpected weight change.   HENT:  Negative for ear pain and sore throat.    Eyes:  Negative for visual disturbance.   Respiratory:  Negative for shortness of breath.    Cardiovascular:  Negative for chest pain.   Gastrointestinal:  Negative for abdominal pain and blood in stool.   Genitourinary:  Negative for dysuria and frequency.   Integumentary:  Negative for rash.   Neurological:  Negative for weakness, numbness and headaches.   Hematological:  Negative for adenopathy.   Psychiatric/Behavioral:  Negative for suicidal ideas.       Objective:   BP (!) 145/76 (BP Location: Left arm, Patient Position: Sitting, BP Method: Large (Manual))   Pulse 82   Temp 98.9 °F (37.2 °C) (Oral)   Ht 5' 5" (1.651 m)   Wt 101.8 kg (224 lb 6.9 oz)   SpO2 96%   BMI 37.35 kg/m²     Physical Exam  Vitals reviewed.   Constitutional:       General: She is not in acute distress.     Appearance: She is obese.   HENT:      Head: Normocephalic and atraumatic.      Right Ear: Ear canal and external ear normal.      Left Ear: Ear canal and external ear normal.      Nose: Nose normal.      Mouth/Throat:      Mouth: Mucous membranes are moist.      Pharynx: No oropharyngeal exudate or posterior oropharyngeal erythema.   Eyes:      Extraocular Movements: Extraocular movements intact.      Conjunctiva/sclera: Conjunctivae normal.      Pupils: Pupils are equal, round, and reactive to light.   Cardiovascular:      Rate and Rhythm: Normal rate and regular rhythm.      Pulses: Normal pulses.      Heart sounds: Normal heart sounds.   Pulmonary:      Effort: Pulmonary effort is normal. No respiratory distress.      " Breath sounds: No wheezing or rales.   Abdominal:      General: Abdomen is flat. Bowel sounds are normal. There is no distension.      Palpations: Abdomen is soft.      Tenderness: There is no abdominal tenderness. There is no guarding.   Musculoskeletal:      Cervical back: Normal range of motion. No rigidity or tenderness.   Lymphadenopathy:      Cervical: No cervical adenopathy.   Skin:     General: Skin is warm.      Capillary Refill: Capillary refill takes less than 2 seconds.   Neurological:      Mental Status: She is alert and oriented to person, place, and time.      Cranial Nerves: No cranial nerve deficit.      Sensory: No sensory deficit.   Psychiatric:         Mood and Affect: Mood normal.         Behavior: Behavior normal.      Assessment        ICD-10-CM ICD-9-CM   1. General medical exam  Z00.00 V70.9   2. Suspected sleep apnea  R29.818 781.99   3. Encounter for screening for lipid disorder  Z13.220 V77.91   4. Encounter for screening mammogram for malignant neoplasm of breast  Z12.31 V76.12   5. Benign hypertension  I10 401.1   6. Encounter for screening for HIV  Z11.4 V73.89   7. Need for hepatitis C screening test  Z11.59 V73.89   8. Class 2 severe obesity due to excess calories with serious comorbidity and body mass index (BMI) of 37.0 to 37.9 in adult  E66.01 278.01    Z68.37 V85.37   9. Chronic migraine w/o aura w/o status migrainosus, not intractable  G43.709 346.70         Plan:     1. General medical exam  -     Comprehensive Metabolic Panel; Future; Expected date: 05/08/2023  -     CBC Auto Differential; Future; Expected date: 05/08/2023  -     Lipid Panel; Future; Expected date: 05/08/2023  -     HIV 1/2 Ag/Ab (4th Gen); Future; Expected date: 05/08/2023  -     Hepatitis C Antibody; Future; Expected date: 05/08/2023  -     TSH; Future; Expected date: 05/08/2023  Age appropriate recommendations reviewed.  Screening labs ordered.    2. Suspected sleep apnea  Assessment & Plan:  The patient  has multiple symptoms of/ risk factors for sleep apnea. The patient was strongly encouraged to make an appointment with sleep medicine, for further evaluation/testing.    Discussed with patient that sleep apnea can cause and is associated with multiple problems such as daytime tiredness, memory/concentration problems, morning headaches, acid reflux, mood swings or feelings of low mood, accidents when driving, sexual dysfunction, difficulty losing weight, elevated blood sugar/insulin resistance, elevated blood pressure/hypertension, increased urination at night, among other problems.  In addition I explained that if left untreated, sleep apnea can result in irregular heartbeat, including atrial fibrillation, heart failure, myocardial infarctions/heart attacks, stroke, and early death.      Orders:  -     Ambulatory referral/consult to Sleep Disorders; Future; Expected date: 05/15/2023    3. Encounter for screening for lipid disorder  -     Lipid Panel; Future; Expected date: 05/08/2023  Screening lipid panel ordered.     4. Encounter for screening mammogram for malignant neoplasm of breast  -     Mammo Digital Screening Bilat w/ Hill; Future; Expected date: 05/08/2023  Breast cancer screening ordered    5. Benign hypertension  Assessment & Plan:  BP Readings from Last 3 Encounters:   05/08/23 (!) 145/76   03/18/23 (!) 144/88   01/24/22 (!) 132/90      Reviewed with patient that her blood pressure is not controlled.    ACC/AHA blood pressure recommendation guidelines reviewed with patient.  Dietary and lifestyle modifications discussed and information printed.  Also discussed how sleep apnea could contribute to this and recommended evaluation for sleep apnea.  Patient prefers to make changes first and recheck blood pressure few months.    Orders:  -     CBC Auto Differential; Future; Expected date: 05/08/2023  -     Lipid Panel; Future; Expected date: 05/08/2023  -     Hemoglobin A1C; Future; Expected date:  "05/08/2023  -     TSH; Future; Expected date: 05/08/2023  -     Hypertension Digital Medicine (HDMP) Enrollment Order  -     Hypertension Digital Medicine (HDMP): Assign Onboarding Questionnaires    6. Encounter for screening for HIV  -     HIV 1/2 Ag/Ab (4th Gen); Future; Expected date: 05/08/2023  Screen for HIV as per USPSTF guidelines    7. Need for hepatitis C screening test  -     Hepatitis C Antibody; Future; Expected date: 05/08/2023  Screen for HCV as per USPSTF guidelines    8. Class 2 severe obesity due to excess calories with serious comorbidity and body mass index (BMI) of 37.0 to 37.9 in adult  The patient's BMI has been recorded in the chart. The patient has been provided educational materials regarding the benefits of attaining and maintaining a normal weight. We will continue to address and follow this issue during follow up visits.    9. Chronic migraine w/o aura w/o status migrainosus, not intractable  Overview:  Verapamil 120mg  Good control    Assessment & Plan:  Continue verapamil.             -Bossman Abdi Jr., MD, AAHIVS      This office note has been dictated.  This dictation has been generated using M-Modal Fluency Direct dictation; some phonetic errors may occur.         There are no Patient Instructions on file for this visit.      Future Appointments   Date Time Provider Department Center   6/12/2023  3:50 PM [facility]            Clinical References      Patient Education       High Blood Pressure in Adults   The Basics   Written by the doctors and editors at UpToDate   What is high blood pressure? -- High blood pressure is a condition that puts you at risk for heart attack, stroke, and kidney disease. It does not usually cause symptoms. But it can be serious.  When your doctor or nurse tells you your blood pressure, they will say 2 numbers. For instance, your doctor or nurse might say that your blood pressure is "130 over 80." The top number is the pressure " "inside your arteries when your heart is pao. The bottom number is the pressure inside your arteries when your heart is relaxed.  "Elevated blood pressure" is a term doctors or nurses use as a warning. People with elevated blood pressure do not yet have high blood pressure. But their blood pressure is not as low as it should be for good health.  Many experts define high, elevated, and normal blood pressure as follows:  High - Top number of 130 or above and/or bottom number of 80 or above  Elevated - Top number between 120 and 129 and bottom number of 79 or below  Normal - Top number of 119 or below and bottom number of 79 or below  This information is also in the table (table 1).   How can I lower my blood pressure? -- If your doctor or nurse has prescribed blood pressure medicine, the most important thing you can do is to take it. If it causes side effects, do not just stop taking it. Instead, talk to your doctor or nurse about the problems it causes. They might be able to lower your dose or switch you to another medicine. If cost is a problem, mention that too. They might be able to put you on a less expensive medicine. Taking your blood pressure medicine can keep you from having a heart attack or stroke, and it can save your life!  Can I do anything on my own? -- You have a lot of control over your blood pressure. To lower it:  Lose weight (if you are overweight)  Choose a diet low in fat and rich in fruits, vegetables, and low-fat dairy products  Reduce the amount of salt you eat  Do something active for at least 30 minutes a day on most days of the week  Cut down on alcohol (if you drink more than 2 alcoholic drinks per day)  It's also a good idea to get a home blood pressure meter. People who check their own blood pressure at home do better at keeping it low and can sometimes even reduce the amount of medicine they take.  All topics are updated as new evidence becomes available and our peer review " "process is complete.  This topic retrieved from [employer] on: Sep 21, 2021.  Topic 45660 Version 15.0  Release: 29.4.2 - C29.263  © 2021 UpToDate, Inc. and/or its affiliates. All rights reserved.  table 1: Definition of normal and high blood pressure  Level  Top number  Bottom number    High 130 or above 80 or above   Elevated 120 to 129 79 or below   Normal 119 or below 79 or below   These definitions are from the American College of Cardiology/American Heart Association. Other expert groups might use slightly different definitions.  "Elevated blood pressure" is a term doctor or nurses use as a warning. It means you do not yet have high blood pressure, but your blood pressure is not as low as it should be for good health.  Graphic 20883 Version 6.0  Consumer Information Use and Disclaimer   This information is not specific medical advice and does not replace information you receive from your health care provider. This is only a brief summary of general information. It does NOT include all information about conditions, illnesses, injuries, tests, procedures, treatments, therapies, discharge instructions or life-style choices that may apply to you. You must talk with your health care provider for complete information about your health and treatment options. This information should not be used to decide whether or not to accept your health care provider's advice, instructions or recommendations. Only your health care provider has the knowledge and training to provide advice that is right for you. The use of this information is governed by the [employer] [facility] End User License Agreement, available at [email or web address].The use of [employer] content is governed by the [employer] Terms of Use. ©2021 UpToDate, Inc. All rights reserved.  Copyright   © 2021 UpToDate, Inc. and/or its affiliates. All rights reserved.             "

## 2023-05-09 ENCOUNTER — LAB VISIT (OUTPATIENT)
Dept: LAB | Facility: HOSPITAL | Age: 53
End: 2023-05-09
Attending: FAMILY MEDICINE
Payer: COMMERCIAL

## 2023-05-09 DIAGNOSIS — Z11.4 ENCOUNTER FOR SCREENING FOR HIV: ICD-10-CM

## 2023-05-09 DIAGNOSIS — Z13.220 ENCOUNTER FOR SCREENING FOR LIPID DISORDER: ICD-10-CM

## 2023-05-09 DIAGNOSIS — Z00.00 GENERAL MEDICAL EXAM: ICD-10-CM

## 2023-05-09 DIAGNOSIS — I10 BENIGN HYPERTENSION: Chronic | ICD-10-CM

## 2023-05-09 DIAGNOSIS — Z11.59 NEED FOR HEPATITIS C SCREENING TEST: ICD-10-CM

## 2023-05-09 LAB
ALBUMIN SERPL BCP-MCNC: 4 G/DL (ref 3.5–5.2)
ALP SERPL-CCNC: 67 U/L (ref 55–135)
ALT SERPL W/O P-5'-P-CCNC: 16 U/L (ref 10–44)
ANION GAP SERPL CALC-SCNC: 5 MMOL/L (ref 8–16)
AST SERPL-CCNC: 15 U/L (ref 10–40)
BASOPHILS # BLD AUTO: 0.06 K/UL (ref 0–0.2)
BASOPHILS NFR BLD: 0.9 % (ref 0–1.9)
BILIRUB SERPL-MCNC: 0.5 MG/DL (ref 0.1–1)
BUN SERPL-MCNC: 13 MG/DL (ref 6–20)
CALCIUM SERPL-MCNC: 8.9 MG/DL (ref 8.7–10.5)
CHLORIDE SERPL-SCNC: 110 MMOL/L (ref 95–110)
CHOLEST SERPL-MCNC: 179 MG/DL (ref 120–199)
CHOLEST/HDLC SERPL: 4.1 {RATIO} (ref 2–5)
CO2 SERPL-SCNC: 25 MMOL/L (ref 23–29)
CREAT SERPL-MCNC: 0.8 MG/DL (ref 0.5–1.4)
DIFFERENTIAL METHOD: ABNORMAL
EOSINOPHIL # BLD AUTO: 0.7 K/UL (ref 0–0.5)
EOSINOPHIL NFR BLD: 10.5 % (ref 0–8)
ERYTHROCYTE [DISTWIDTH] IN BLOOD BY AUTOMATED COUNT: 13.2 % (ref 11.5–14.5)
EST. GFR  (NO RACE VARIABLE): >60 ML/MIN/1.73 M^2
ESTIMATED AVG GLUCOSE: 108 MG/DL (ref 68–131)
GLUCOSE SERPL-MCNC: 100 MG/DL (ref 70–110)
HBA1C MFR BLD: 5.4 % (ref 4–5.6)
HCT VFR BLD AUTO: 39.7 % (ref 37–48.5)
HCV AB SERPL QL IA: NORMAL
HDLC SERPL-MCNC: 44 MG/DL (ref 40–75)
HDLC SERPL: 24.6 % (ref 20–50)
HGB BLD-MCNC: 12.9 G/DL (ref 12–16)
HIV 1+2 AB+HIV1 P24 AG SERPL QL IA: NORMAL
IMM GRANULOCYTES # BLD AUTO: 0.02 K/UL (ref 0–0.04)
IMM GRANULOCYTES NFR BLD AUTO: 0.3 % (ref 0–0.5)
LDLC SERPL CALC-MCNC: 115.6 MG/DL (ref 63–159)
LYMPHOCYTES # BLD AUTO: 2.6 K/UL (ref 1–4.8)
LYMPHOCYTES NFR BLD: 38.3 % (ref 18–48)
MCH RBC QN AUTO: 28.5 PG (ref 27–31)
MCHC RBC AUTO-ENTMCNC: 32.5 G/DL (ref 32–36)
MCV RBC AUTO: 88 FL (ref 82–98)
MONOCYTES # BLD AUTO: 0.5 K/UL (ref 0.3–1)
MONOCYTES NFR BLD: 7.7 % (ref 4–15)
NEUTROPHILS # BLD AUTO: 2.9 K/UL (ref 1.8–7.7)
NEUTROPHILS NFR BLD: 42.3 % (ref 38–73)
NONHDLC SERPL-MCNC: 135 MG/DL
NRBC BLD-RTO: 0 /100 WBC
PLATELET # BLD AUTO: 258 K/UL (ref 150–450)
PMV BLD AUTO: 12 FL (ref 9.2–12.9)
POTASSIUM SERPL-SCNC: 3.8 MMOL/L (ref 3.5–5.1)
PROT SERPL-MCNC: 6.8 G/DL (ref 6–8.4)
RBC # BLD AUTO: 4.52 M/UL (ref 4–5.4)
SODIUM SERPL-SCNC: 140 MMOL/L (ref 136–145)
TRIGL SERPL-MCNC: 97 MG/DL (ref 30–150)
TSH SERPL DL<=0.005 MIU/L-ACNC: 1.89 UIU/ML (ref 0.4–4)
WBC # BLD AUTO: 6.79 K/UL (ref 3.9–12.7)

## 2023-05-09 PROCEDURE — 87389 HIV-1 AG W/HIV-1&-2 AB AG IA: CPT | Performed by: FAMILY MEDICINE

## 2023-05-09 PROCEDURE — 85025 COMPLETE CBC W/AUTO DIFF WBC: CPT | Performed by: FAMILY MEDICINE

## 2023-05-09 PROCEDURE — 86803 HEPATITIS C AB TEST: CPT | Performed by: FAMILY MEDICINE

## 2023-05-09 PROCEDURE — 80061 LIPID PANEL: CPT | Performed by: FAMILY MEDICINE

## 2023-05-09 PROCEDURE — 80053 COMPREHEN METABOLIC PANEL: CPT | Performed by: FAMILY MEDICINE

## 2023-05-09 PROCEDURE — 36415 COLL VENOUS BLD VENIPUNCTURE: CPT | Performed by: FAMILY MEDICINE

## 2023-05-09 PROCEDURE — 83036 HEMOGLOBIN GLYCOSYLATED A1C: CPT | Performed by: FAMILY MEDICINE

## 2023-05-09 PROCEDURE — 84443 ASSAY THYROID STIM HORMONE: CPT | Performed by: FAMILY MEDICINE

## 2023-05-11 ENCOUNTER — OFFICE VISIT (OUTPATIENT)
Dept: SLEEP MEDICINE | Facility: CLINIC | Age: 53
End: 2023-05-11
Payer: COMMERCIAL

## 2023-05-11 DIAGNOSIS — R29.818 SUSPECTED SLEEP APNEA: ICD-10-CM

## 2023-05-11 DIAGNOSIS — R06.83 SNORING: ICD-10-CM

## 2023-05-11 DIAGNOSIS — R35.1 NOCTURIA: ICD-10-CM

## 2023-05-11 DIAGNOSIS — R06.81 WITNESSED EPISODE OF APNEA: Primary | ICD-10-CM

## 2023-05-11 DIAGNOSIS — R40.0 DAYTIME SLEEPINESS: ICD-10-CM

## 2023-05-11 PROCEDURE — 3044F HG A1C LEVEL LT 7.0%: CPT | Mod: CPTII,95,, | Performed by: PHYSICIAN ASSISTANT

## 2023-05-11 PROCEDURE — 3044F PR MOST RECENT HEMOGLOBIN A1C LEVEL <7.0%: ICD-10-PCS | Mod: CPTII,95,, | Performed by: PHYSICIAN ASSISTANT

## 2023-05-11 PROCEDURE — 1159F PR MEDICATION LIST DOCUMENTED IN MEDICAL RECORD: ICD-10-PCS | Mod: CPTII,95,, | Performed by: PHYSICIAN ASSISTANT

## 2023-05-11 PROCEDURE — 99213 OFFICE O/P EST LOW 20 MIN: CPT | Mod: 95,,, | Performed by: PHYSICIAN ASSISTANT

## 2023-05-11 PROCEDURE — 1160F RVW MEDS BY RX/DR IN RCRD: CPT | Mod: CPTII,95,, | Performed by: PHYSICIAN ASSISTANT

## 2023-05-11 PROCEDURE — 1160F PR REVIEW ALL MEDS BY PRESCRIBER/CLIN PHARMACIST DOCUMENTED: ICD-10-PCS | Mod: CPTII,95,, | Performed by: PHYSICIAN ASSISTANT

## 2023-05-11 PROCEDURE — 1159F MED LIST DOCD IN RCRD: CPT | Mod: CPTII,95,, | Performed by: PHYSICIAN ASSISTANT

## 2023-05-11 PROCEDURE — 99213 PR OFFICE/OUTPT VISIT, EST, LEVL III, 20-29 MIN: ICD-10-PCS | Mod: 95,,, | Performed by: PHYSICIAN ASSISTANT

## 2023-05-11 NOTE — PROGRESS NOTES
The chief complaint leading to consultation is: sleep problems  The patient location is: [place]  Visit type: audiovisual     18 minutes of total time spent on the encounter, which includes face to face time and non-face to face time preparing to see the patient (eg, review of tests), Obtaining and/or reviewing separately obtained history, Documenting clinical information in the electronic or other health record, Independently interpreting results (not separately reported) and communicating results to the patient/family/caregiver, or Care coordination (not separately reported).     Each patient to whom he or she provides medical services by telemedicine is:  (1) informed of the relationship between the physician and patient and the respective role of any other health care provider with respect to management of the patient; and (2) notified that he or she may decline to receive medical services by telemedicine and may withdraw from such care at any time.          Referred by Bossman Abdi Jr., MD     NEW PATIENT VISIT    Jennifer Bonilla  is a pleasant 52 y.o. female  with PMH significant for HTN, RBBB, migraine, IC, TMJ, BMI 37+ who presents for evaluation of suspected ESTELLE after referral from her PCP.    She c/o of constantly being fatigue and sleepy throughout the day. States sleep does not feel restorative. States  states that she snores loudly, snores to the point that she wakes herself up, and  that she stops breathing in her sleep. States she brought these concerns up to her PCP, who recommended and evaluation for ESTELLE.    Denies H/H hallucinations, cataplexy, dream enactment, sleep walking, restless legs.    SLEEP SCHEDULE   Environment    Bed Time 11:30PM   Sleep Latency 10 seconds   Arousals 0   Nocturia 1   Back to sleep 10 minutes   Wake time 5AM work day, 9AM off day   Naps none   Work        Sleep Clinic ROS  5/11/2023   Difficulty breathing through the nose?  No   Sore throat or dry mouth in  the morning? Yes   Irregular or very fast heart beat?  Sometimes   Shortness of breath?  No   Acid reflux? Yes   Body aches and pains?  Yes   Morning headaches? No   Dizziness? No   Mood changes?  Yes   Do you exercise?  No   Do you feel like moving your legs a lot?  Yes       Past Medical History:   Diagnosis Date    Acid reflux     Benign hypertension 5/8/2023    Chronic migraine w/o aura w/o status migrainosus, not intractable 03/21/2017    Family history of colon cancer 7/22/2019    History of colonic polyps 7/22/2019    Interstitial cystitis 9/22/2014    Mood disorder 7/22/2019    Palpitations 4/1/2014    Post-menopausal 7/22/2019    RBBB 05/09/2014    Pt's Ep study is not c/w WPW or Brugada syndrome.  She has RBBB with short GA interval.    TMJ (temporomandibular joint disorder)     jaw clicking     Patient Active Problem List   Diagnosis    Menorrhagia    Palpitations    Pharyngitis    RBBB    Interstitial cystitis    TMJ (temporomandibular joint syndrome)    Chronic migraine w/o aura w/o status migrainosus, not intractable    Internal derangement of knee    GERD (gastroesophageal reflux disease)    Family history of colon cancer    History of colonic polyps    Mood disorder    Post-menopausal    Refractive error    Suspected sleep apnea    Benign hypertension       Current Outpatient Medications:     verapamiL (VERELAN) 120 MG C24P, Take 1 capsule (120 mg total) by mouth once daily., Disp: 90 capsule, Rfl: 12     There were no vitals filed for this visit.  Physical Exam:    GEN:   Well-appearing  Psych:  Appropriate affect, demonstrates insight  SKIN:  No rash on the face or bridge of the nose      LABS:   Lab Results   Component Value Date    HGB 12.9 05/09/2023    CO2 25 05/09/2023       RECORDS REVIEWED PREVIOUSLY:    No prior sleep testing.    ASSESSMENT    EPWORTH SLEEPINESS SCALE 5/11/2023   Sitting and reading 1   Watching TV 1   Sitting, inactive in a public place (e.g. a theatre or a meeting) 0   As  a passenger in a car for an hour without a break 0   Lying down to rest in the afternoon when circumstances permit 2   Sitting and talking to someone 0   Sitting quietly after a lunch without alcohol 0   In a car, while stopped for a few minutes in traffic 0   Total score 4   Denies drowsy driving or ever falling asleep behind the wheel.      PROBLEM DESCRIPTION/ Sx on Presentation  STATUS   sx ESTELLE   + snoring, + gasping arousals, + witnessed apneas   Wakes with heart racing on occasion  New   Daytime Sx   + sleepiness when inactive   ESS 4/24 on intake  New   Insomnia   Trouble falling asleep: none  Maintenance:         wakes on occasion   Prior hypnotics:        Current hypnotics:     New   Nocturia   x 1 per sleep period  New       PLAN     -recommend sleep testing   -HST ordered  -discussed trial therapy if ESTELLE present and the patient is open to a trial of CPAP therapy  -discussed ESTELLE and CPAP with patient in detail, including possible complications of untreated ESTELLE like heart attack/stroke  -advised on strict driving precautions; advised never to drive drowsy    Advised on plan of care. Answered all patient questions. Patient verbalized understanding and voiced agreement with plan of care.     RTC if dx of ESTELLE made and CPAP ordered, will need follow up within 31-90 days of receiving machine for compliance       The patient was given open opportunity to ask questions and/or express concerns about treatment plan.   All questions/concerns were discussed.     Two patient identifiers used prior to evaluation.

## 2023-05-13 PROBLEM — E66.01 CLASS 2 SEVERE OBESITY DUE TO EXCESS CALORIES WITH SERIOUS COMORBIDITY AND BODY MASS INDEX (BMI) OF 37.0 TO 37.9 IN ADULT: Status: ACTIVE | Noted: 2023-05-13

## 2023-05-13 PROBLEM — E66.812 CLASS 2 SEVERE OBESITY DUE TO EXCESS CALORIES WITH SERIOUS COMORBIDITY AND BODY MASS INDEX (BMI) OF 37.0 TO 37.9 IN ADULT: Status: ACTIVE | Noted: 2023-05-13

## 2023-05-13 PROBLEM — F39 MOOD DISORDER: Status: RESOLVED | Noted: 2019-07-22 | Resolved: 2023-05-13

## 2023-05-13 NOTE — ASSESSMENT & PLAN NOTE
BP Readings from Last 3 Encounters:   05/08/23 (!) 145/76   03/18/23 (!) 144/88   01/24/22 (!) 132/90      Reviewed with patient that her blood pressure is not controlled.    ACC/AHA blood pressure recommendation guidelines reviewed with patient.  Dietary and lifestyle modifications discussed and information printed.  Also discussed how sleep apnea could contribute to this and recommended evaluation for sleep apnea.  Patient prefers to make changes first and recheck blood pressure few months.

## 2023-05-15 ENCOUNTER — TELEPHONE (OUTPATIENT)
Dept: FAMILY MEDICINE | Facility: CLINIC | Age: 53
End: 2023-05-15
Payer: COMMERCIAL

## 2023-05-15 NOTE — TELEPHONE ENCOUNTER
----- Message from Sarika Yanez sent at 5/12/2023  4:36 PM CDT -----  Type:  Test Results    Who Called: pt requesting to get results for lab. Call pt     Name of Test (Lab/Mammo/Etc): lab work    Date of Test: 5/8/23    Ordering Provider: hunter    Where the test was performed:     Would the patient rather a call back or a response via My [facility]? call    Best Call Back Number: [phone] (home) [phone] (work)      Additional Information:      For Clinical Team:Has the provider reviewed the results?

## 2023-05-16 ENCOUNTER — HOSPITAL ENCOUNTER (OUTPATIENT)
Dept: SLEEP MEDICINE | Facility: HOSPITAL | Age: 53
Discharge: HOME | End: 2023-05-16
Attending: INTERNAL MEDICINE
Payer: COMMERCIAL

## 2023-05-16 DIAGNOSIS — R06.81 WITNESSED EPISODE OF APNEA: ICD-10-CM

## 2023-05-16 DIAGNOSIS — R06.83 SNORING: ICD-10-CM

## 2023-05-16 DIAGNOSIS — R29.818 SUSPECTED SLEEP APNEA: ICD-10-CM

## 2023-05-16 DIAGNOSIS — R35.1 NOCTURIA: ICD-10-CM

## 2023-05-16 DIAGNOSIS — R40.0 DAYTIME SLEEPINESS: ICD-10-CM

## 2023-05-16 PROCEDURE — 95800 SLP STDY UNATTENDED: CPT

## 2023-05-16 NOTE — PROGRESS NOTES
The patient ID was verified. She was instructed on how to turn the Home Sleep Testing device on and off, how to apply the sensors. He or She was encouraged to sleep on supine position and must have 6 hours of sleep. The patient was instructed not to get the device wet and return it back to us. All questions were answered prior to patient leaving.  She was provided the after visit summary.

## 2023-05-17 ENCOUNTER — TELEPHONE (OUTPATIENT)
Dept: SLEEP MEDICINE | Facility: HOSPITAL | Age: 53
End: 2023-05-17
Payer: COMMERCIAL

## 2023-05-18 NOTE — PROGRESS NOTES
Encounter date 5/18/23  The HSAT device was received and uploaded this morning. The recorded sleep data noted to be adequate and the patient did not report issue with the device during the study.

## 2023-05-19 PROCEDURE — 95806 PR SLEEP STUDY, UNATTENDED, SIMUL RECORD HR/O2 SAT/RESP FLOW/RESP EFFT: ICD-10-PCS | Mod: 26,,, | Performed by: INTERNAL MEDICINE

## 2023-05-19 PROCEDURE — 95806 SLEEP STUDY UNATT&RESP EFFT: CPT | Mod: 26,,, | Performed by: INTERNAL MEDICINE

## 2023-05-22 ENCOUNTER — PATIENT MESSAGE (OUTPATIENT)
Dept: SLEEP MEDICINE | Facility: CLINIC | Age: 53
End: 2023-05-22
Payer: COMMERCIAL

## 2023-05-22 DIAGNOSIS — G47.33 OSA (OBSTRUCTIVE SLEEP APNEA): Primary | ICD-10-CM

## 2023-05-23 ENCOUNTER — PATIENT MESSAGE (OUTPATIENT)
Dept: SLEEP MEDICINE | Facility: CLINIC | Age: 53
End: 2023-05-23
Payer: COMMERCIAL

## 2023-06-12 ENCOUNTER — HOSPITAL ENCOUNTER (OUTPATIENT)
Dept: RADIOLOGY | Facility: HOSPITAL | Age: 53
Discharge: HOME | End: 2023-06-12
Attending: FAMILY MEDICINE
Payer: COMMERCIAL

## 2023-06-12 DIAGNOSIS — Z12.31 ENCOUNTER FOR SCREENING MAMMOGRAM FOR MALIGNANT NEOPLASM OF BREAST: ICD-10-CM

## 2023-06-12 PROCEDURE — 77067 SCR MAMMO BI INCL CAD: CPT | Mod: TC

## 2023-06-12 PROCEDURE — 77067 SCR MAMMO BI INCL CAD: CPT | Mod: 26,,, | Performed by: RADIOLOGY

## 2023-06-12 PROCEDURE — 77063 BREAST TOMOSYNTHESIS BI: CPT | Mod: 26,,, | Performed by: RADIOLOGY

## 2023-06-12 PROCEDURE — 77063 MAMMO DIGITAL SCREENING BILAT WITH TOMO: ICD-10-PCS | Mod: 26,,, | Performed by: RADIOLOGY

## 2023-06-12 PROCEDURE — 77067 MAMMO DIGITAL SCREENING BILAT WITH TOMO: ICD-10-PCS | Mod: 26,,, | Performed by: RADIOLOGY

## 2023-08-19 DIAGNOSIS — G44.229 CHRONIC TENSION-TYPE HEADACHE, NOT INTRACTABLE: ICD-10-CM

## 2023-08-19 DIAGNOSIS — G43.709 CHRONIC MIGRAINE WITHOUT AURA WITHOUT STATUS MIGRAINOSUS, NOT INTRACTABLE: ICD-10-CM

## 2023-08-19 NOTE — TELEPHONE ENCOUNTER
No care due was identified.  [facility] Embedded Care Due Messages. Reference number: [id number].   8/19/2023 12:18:23 PM CDT

## 2023-08-22 RX ORDER — VERAPAMIL HYDROCHLORIDE 120 MG/1
120 CAPSULE, EXTENDED RELEASE ORAL
Qty: 30 CAPSULE | Refills: 0 | Status: SHIPPED | OUTPATIENT
Start: 2023-08-22 | End: 2023-09-20 | Stop reason: DRUGHIGH

## 2023-09-20 ENCOUNTER — OFFICE VISIT (OUTPATIENT)
Dept: FAMILY MEDICINE | Facility: CLINIC | Age: 53
End: 2023-09-20
Payer: COMMERCIAL

## 2023-09-20 VITALS
SYSTOLIC BLOOD PRESSURE: 148 MMHG | HEIGHT: 65 IN | OXYGEN SATURATION: 98 % | DIASTOLIC BLOOD PRESSURE: 78 MMHG | BODY MASS INDEX: 36.07 KG/M2 | TEMPERATURE: 98 F | HEART RATE: 65 BPM | WEIGHT: 216.5 LBS

## 2023-09-20 DIAGNOSIS — I10 BENIGN HYPERTENSION: Chronic | ICD-10-CM

## 2023-09-20 DIAGNOSIS — E66.01 CLASS 2 SEVERE OBESITY DUE TO EXCESS CALORIES WITH SERIOUS COMORBIDITY AND BODY MASS INDEX (BMI) OF 36.0 TO 36.9 IN ADULT: Chronic | ICD-10-CM

## 2023-09-20 DIAGNOSIS — R53.83 FATIGUE, UNSPECIFIED TYPE: Primary | ICD-10-CM

## 2023-09-20 PROCEDURE — 1159F PR MEDICATION LIST DOCUMENTED IN MEDICAL RECORD: ICD-10-PCS | Mod: CPTII,S$GLB,, | Performed by: FAMILY MEDICINE

## 2023-09-20 PROCEDURE — 1160F RVW MEDS BY RX/DR IN RCRD: CPT | Mod: CPTII,S$GLB,, | Performed by: FAMILY MEDICINE

## 2023-09-20 PROCEDURE — 3077F SYST BP >= 140 MM HG: CPT | Mod: CPTII,S$GLB,, | Performed by: FAMILY MEDICINE

## 2023-09-20 PROCEDURE — 3077F PR MOST RECENT SYSTOLIC BLOOD PRESSURE >= 140 MM HG: ICD-10-PCS | Mod: CPTII,S$GLB,, | Performed by: FAMILY MEDICINE

## 2023-09-20 PROCEDURE — 99999 PR PBB SHADOW E&M-EST. PATIENT-LVL IV: ICD-10-PCS | Mod: PBBFAC,,, | Performed by: FAMILY MEDICINE

## 2023-09-20 PROCEDURE — 99214 OFFICE O/P EST MOD 30 MIN: CPT | Mod: S$GLB,,, | Performed by: FAMILY MEDICINE

## 2023-09-20 PROCEDURE — 1159F MED LIST DOCD IN RCRD: CPT | Mod: CPTII,S$GLB,, | Performed by: FAMILY MEDICINE

## 2023-09-20 PROCEDURE — 99999 PR PBB SHADOW E&M-EST. PATIENT-LVL IV: CPT | Mod: PBBFAC,,, | Performed by: FAMILY MEDICINE

## 2023-09-20 PROCEDURE — 3008F BODY MASS INDEX DOCD: CPT | Mod: CPTII,S$GLB,, | Performed by: FAMILY MEDICINE

## 2023-09-20 PROCEDURE — 3078F DIAST BP <80 MM HG: CPT | Mod: CPTII,S$GLB,, | Performed by: FAMILY MEDICINE

## 2023-09-20 PROCEDURE — 3044F HG A1C LEVEL LT 7.0%: CPT | Mod: CPTII,S$GLB,, | Performed by: FAMILY MEDICINE

## 2023-09-20 PROCEDURE — 3044F PR MOST RECENT HEMOGLOBIN A1C LEVEL <7.0%: ICD-10-PCS | Mod: CPTII,S$GLB,, | Performed by: FAMILY MEDICINE

## 2023-09-20 PROCEDURE — 3008F PR BODY MASS INDEX (BMI) DOCUMENTED: ICD-10-PCS | Mod: CPTII,S$GLB,, | Performed by: FAMILY MEDICINE

## 2023-09-20 PROCEDURE — 3078F PR MOST RECENT DIASTOLIC BLOOD PRESSURE < 80 MM HG: ICD-10-PCS | Mod: CPTII,S$GLB,, | Performed by: FAMILY MEDICINE

## 2023-09-20 PROCEDURE — 99214 PR OFFICE/OUTPT VISIT, EST, LEVL IV, 30-39 MIN: ICD-10-PCS | Mod: S$GLB,,, | Performed by: FAMILY MEDICINE

## 2023-09-20 PROCEDURE — 1160F PR REVIEW ALL MEDS BY PRESCRIBER/CLIN PHARMACIST DOCUMENTED: ICD-10-PCS | Mod: CPTII,S$GLB,, | Performed by: FAMILY MEDICINE

## 2023-09-20 RX ORDER — VERAPAMIL HYDROCHLORIDE 180 MG/1
180 TABLET, FILM COATED, EXTENDED RELEASE ORAL NIGHTLY
Qty: 90 TABLET | Refills: 0 | Status: SHIPPED | OUTPATIENT
Start: 2023-09-20

## 2023-09-20 RX ORDER — PHENTERMINE HYDROCHLORIDE 37.5 MG/1
37.5 TABLET ORAL
Qty: 30 TABLET | Refills: 0 | Status: SHIPPED | OUTPATIENT
Start: 2023-09-20 | End: 2023-10-21

## 2023-09-20 NOTE — PATIENT INSTRUCTIONS
BP Readings from Last 10 Encounters:   09/20/23 (!) 148/78   05/08/23 (!) 145/76   03/18/23 (!) 144/88   01/24/22 (!) 132/90   01/03/21 138/73   01/07/20 (!) 146/84   11/23/19 (!) 141/87   07/22/19 122/88   05/31/19 (!) 140/90   01/15/19 126/65

## 2023-09-21 ENCOUNTER — LAB VISIT (OUTPATIENT)
Dept: LAB | Facility: HOSPITAL | Age: 53
End: 2023-09-21
Attending: FAMILY MEDICINE
Payer: COMMERCIAL

## 2023-09-21 DIAGNOSIS — R53.83 FATIGUE, UNSPECIFIED TYPE: ICD-10-CM

## 2023-09-21 LAB
FOLATE SERPL-MCNC: 9.2 NG/ML (ref 4–24)
VIT B12 SERPL-MCNC: 582 PG/ML (ref 210–950)

## 2023-09-21 PROCEDURE — 82746 ASSAY OF FOLIC ACID SERUM: CPT | Performed by: FAMILY MEDICINE

## 2023-09-21 PROCEDURE — 82607 VITAMIN B-12: CPT | Performed by: FAMILY MEDICINE

## 2023-09-21 PROCEDURE — 36415 COLL VENOUS BLD VENIPUNCTURE: CPT | Performed by: FAMILY MEDICINE

## 2023-09-24 PROBLEM — E66.812 CLASS 2 SEVERE OBESITY DUE TO EXCESS CALORIES WITH SERIOUS COMORBIDITY AND BODY MASS INDEX (BMI) OF 37.0 TO 37.9 IN ADULT: Chronic | Status: ACTIVE | Noted: 2023-05-13

## 2023-09-24 PROBLEM — E66.01 CLASS 2 SEVERE OBESITY DUE TO EXCESS CALORIES WITH SERIOUS COMORBIDITY AND BODY MASS INDEX (BMI) OF 37.0 TO 37.9 IN ADULT: Chronic | Status: ACTIVE | Noted: 2023-05-13

## 2023-09-24 NOTE — ASSESSMENT & PLAN NOTE
The patient's BMI has been recorded in the chart. The patient has been provided educational materials regarding the benefits of attaining and maintaining a normal weight. We will continue to address and follow this issue during follow up visits.     Discussed using medication to help with weight management.  Risks and benefits of Adipex discussed.  Patient agreement to start.  Will follow-up in the next 3 to 4 weeks.

## 2023-09-24 NOTE — PROGRESS NOTES
"  Patient Name: Jennifer Bonilla    : 1970  MRN: [id number]      Subjective:     Patient ID: Jennifer is a 53 y.o. female    Chief Complaint:  Fatigue    53-year-old female presents for evaluation of fatigue.  She reports that she feels tired most of time.  She has had a sleep study done and was found to have sleep apnea.  However she has been having difficulty with mask.  She reports trying to get adequate sleep.         Review of Systems   Constitutional:  Positive for activity change and fatigue. Negative for fever and unexpected weight change.   HENT:  Negative for ear pain and sore throat.    Respiratory:  Negative for shortness of breath.    Cardiovascular:  Negative for chest pain.   Gastrointestinal:  Negative for abdominal pain and blood in stool.   Genitourinary:  Negative for dysuria.   Integumentary:  Negative for rash.   Neurological:  Negative for weakness.   Hematological:  Negative for adenopathy.   Psychiatric/Behavioral:  Negative for suicidal ideas.         Objective:   BP (!) 148/78 (BP Location: Right arm, Patient Position: Sitting, BP Method: Large (Manual))   Pulse 65   Temp 98.2 °F (36.8 °C) (Oral)   Ht 5' 5" (1.651 m)   Wt 98.2 kg (216 lb 7.9 oz)   SpO2 98%   BMI 36.03 kg/m²     Physical Exam  Vitals reviewed.   Constitutional:       General: She is not in acute distress.     Appearance: She is well-developed. She is obese.   HENT:      Head: Normocephalic and atraumatic.      Right Ear: External ear normal.      Left Ear: External ear normal.      Nose: Nose normal.      Mouth/Throat:      Pharynx: No oropharyngeal exudate.   Eyes:      General:         Right eye: No discharge.         Left eye: No discharge.      Conjunctiva/sclera: Conjunctivae normal.      Pupils: Pupils are equal, round, and reactive to light.   Neck:      Trachea: No tracheal deviation.   Cardiovascular:      Rate and Rhythm: Normal rate and regular rhythm.      Heart sounds: Normal heart sounds. No " "murmur heard.  Pulmonary:      Effort: Pulmonary effort is normal.      Breath sounds: Normal breath sounds. No wheezing or rales.   Abdominal:      General: Bowel sounds are normal.      Palpations: Abdomen is soft. Abdomen is not rigid. There is no mass.      Tenderness: There is no abdominal tenderness. There is no guarding.   Musculoskeletal:      Cervical back: Normal range of motion and neck supple.   Lymphadenopathy:      Cervical: No cervical adenopathy.   Neurological:      Mental Status: She is alert and oriented to person, place, and time.      Gait: Gait normal.        Assessment        ICD-10-CM ICD-9-CM   1. Fatigue, unspecified type  R53.83 780.79   2. Class 2 severe obesity due to excess calories with serious comorbidity and body mass index (BMI) of 36.0 to 36.9 in adult  E66.01 278.01    Z68.36 V85.36   3. Benign hypertension  I10 401.1         Plan:     1. Fatigue, unspecified type  -     Vitamin B12; Future; Expected date: 09/20/2023  -     Folate; Future; Expected date: 09/20/2023  Suspect fatigue related to uncontrolled sleep apnea as she is not been able to use CPAP correctly.  Discussed continued management with sleep tech to find an adequate mass.    Will rule out B12 and folate deficiency.  She had thyroid labs done just a few months ago which was normal.    2. Class 2 severe obesity due to excess calories with serious comorbidity and body mass index (BMI) of 36.0 to 36.9 in adult  Overview:  Wt Readings from Last 3 Encounters:   09/20/23 1443 98.2 kg (216 lb 7.9 oz)   05/08/23 1406 101.8 kg (224 lb 6.9 oz)   03/18/23 1320 90.7 kg (200 lb)        Estimated body mass index is 36.03 kg/m² as calculated from the following:    Height as of this encounter: 5' 5" (1.651 m).    Weight as of this encounter: 98.2 kg (216 lb 7.9 oz).  Ideal body weight: 57 kg (125 lb 10.6 oz)     Assessment & Plan:  The patient's BMI has been recorded in the chart. The patient has been provided educational materials " regarding the benefits of attaining and maintaining a normal weight. We will continue to address and follow this issue during follow up visits.     Discussed using medication to help with weight management.  Risks and benefits of Adipex discussed.  Patient agreement to start.  Will follow-up in the next 3 to 4 weeks.    Orders:  -     phentermine (ADIPEX-P) 37.5 mg tablet; Take 1 tablet (37.5 mg total) by mouth before breakfast.  Dispense: 30 tablet; Refill: 0    3. Benign hypertension  Overview:  BP Readings from Last 3 Encounters:   09/20/23 (!) 148/78   05/08/23 (!) 145/76   03/18/23 (!) 144/88        ACC/AHA guidelines on blood pressure goals reviewed.  Reinforced correct way of measuring blood pressure.     Assessment & Plan:  Blood pressure is not controlled.  Will change verapamil to 180 milligrams.  Recheck blood pressure in the next 2 to 4 weeks.    Orders:  -     verapamiL (CALAN-SR) 180 MG CR tablet; Take 1 tablet (180 mg total) by mouth every evening.  Dispense: 90 tablet; Refill: 0           -Bossman Abdi Jr., MD, AAHIVS      This office note has been dictated.  This dictation has been generated using M-Modal Fluency Direct dictation; some phonetic errors may occur.         Patient Instructions     BP Readings from Last 10 Encounters:   09/20/23 (!) 148/78   05/08/23 (!) 145/76   03/18/23 (!) 144/88   01/24/22 (!) 132/90   01/03/21 138/73   01/07/20 (!) 146/84   11/23/19 (!) 141/87   07/22/19 122/88   05/31/19 (!) 140/90   01/15/19 126/65           Future Appointments   Date Time Provider Department Center   10/25/2023  2:20 PM Bossman Abdi Jr., MD [facility]   12/4/2023  1:15 PM Ana Valentin MD [facility]   1/19/2024  1:45 PM Amelia Parkinson OD [facility]   1/19/2024  2:00 PM Amelia Parkinson OD [facility]              Clinical References      Patient Education       Checking Your Blood Pressure at Home   The Basics   Written by the doctors and editors at  "UpToDate   How is blood pressure measured? -- Blood pressure is usually measured with a device that goes around your upper arm. This is often done in a doctor's office. But some people also check their blood pressure themselves, at home or at work.  Blood pressure is explained with 2 numbers. For instance, your blood pressure might be "140 over 90." The first (top) number is the pressure inside your arteries when your heart is pao. The second (bottom) number is the pressure inside your arteries when your heart is relaxed. The table shows how doctors and nurses define high and normal blood pressure (table 1).  If your blood pressure gets too high, it puts you at risk for heart attack, stroke, and kidney disease. High blood pressure does not usually cause symptoms. But it can be serious.  What is a home blood pressure meter? -- A home blood pressure meter (or "monitor") is a device you can use to check your blood pressure yourself. It has a cuff that goes around your upper arm (figure 1). Some devices have a cuff that goes around your wrist instead. But doctors aren't sure if these work as well. The meter also has a small screen, or dial, that shows your blood pressure numbers.  There are also special meters you can wear for a day or 2. These are different because they automatically check your blood pressure throughout the day and night, even while you are sleeping. If your doctor thinks you should use one of these devices, they will talk to you about how to wear it.  Why do I need to check my blood pressure at home? -- If your doctor knows or suspects that you have high blood pressure, they might want you to check it at home. There are a few reasons for this. Your doctor might want to look at:  Whether your blood pressure measures the same at home as it did in the doctor's office  How well your blood pressure medicines are working  Changes in your blood pressure, for example, if it goes up and down  People " who check their own blood pressure at home usually do better at keeping it low.  How do I choose a home blood pressure meter? -- When choosing a home blood pressure meter, you will probably want to think about:  Cost - Some devices cost more than others. You should also check to see if your insurance will help pay for your device.  Size - It's important to make sure the cuff fits your arm comfortably. Your doctor or nurse can help you with this.  How easy it is to use - You should make sure you understand how to use the device. You also need to be able to read the numbers on the screen.  You do not need a prescription to buy a home blood pressure meter. You can buy them at most pharmacies or over the internet. Your doctor or nurse can help you choose the right device for you.  How do I check my blood pressure at home? -- Once you have a home blood pressure meter, your doctor or nurse should check it to make sure it fits you and works correctly.  When it's time to check your blood pressure:  Go to the bathroom and empty your bladder first. Having a full bladder can temporarily increase your blood pressure, making the results inaccurate.  Sit in a chair with your feet flat on the ground.  Try to breathe normally and stay calm.  Attach the cuff to your arm. Place the cuff directly on your skin, not over your clothing. The cuff should be tight enough to not slip down, but not uncomfortably tight.  Sit and relax for about 3 to 5 minutes with the cuff on.  Follow the directions that came with your device to start measuring your blood pressure. This might involve squeezing the bulb at the end of the tube to inflate the cuff (fill it with air). With some monitors, you just need to press a button to inflate the cuff. When the cuff fills with air, it feels like someone is squeezing your arm, but it should not hurt. Then you will slowly deflate the cuff (let the air out of it), or it will deflate by itself. The screen or dial  will show your blood pressure numbers.  Stay seated and relax for 1 minute, then measure your blood pressure again.  How often should I check my blood pressure? -- It depends. Different people need to follow different schedules. Your doctor or nurse will tell you how often to check your blood pressure, and when. Some people need to check their blood pressure twice a day, in the morning and evening.  Your doctor or nurse will probably tell you to keep track of your blood pressure for at least a few days (table 2). Then they will look at the numbers. The reason for this is that it's normal for your blood pressure to change a bit from day to day. For example, the numbers might change depending on whether you recently had caffeine, just exercised, or feel stressed. Checking your blood pressure over several days - or longer - will give your doctor or nurse a better idea of what is average for you.  How should I keep track of my blood pressure? -- Some blood pressure meters will record your numbers for you, or send them to your computer or smartphone. If yours does not do this, you will need to write them down. Your doctor or nurse can help you figure out the best way to keep track of the numbers.  What if my blood pressure is high? -- Your doctor or nurse will tell you what to do if your blood pressure is high when you check it at home. If you get a number that is higher than normal, measure it again to see if it is still high. If it is very high (above a certain number, which your doctor or nurse will tell you to watch out for), you should call your doctor right away.  If your blood pressure is only a little high, your doctor or nurse might tell you to keep checking it for a few more days or weeks, and then call if it does not go back down. Then they can help you decide what to do next.  All topics are updated as new evidence becomes available and our peer review process is complete.  This topic retrieved from [employer]  "on: Sep 21, 2021.  Topic 443734 Version 4.0  Release: 29.4.2 - C29.263  © 2021 UpToDate, Inc. and/or its affiliates. All rights reserved.  table 1: Definition of normal and high blood pressure  Level  Top number  Bottom number    High 130 or above 80 or above   Elevated 120 to 129 79 or below   Normal 119 or below 79 or below   These definitions are from the American College of Cardiology/American Heart Association. Other expert groups might use slightly different definitions.  "Elevated blood pressure" is a term doctor or nurses use as a warning. It means you do not yet have high blood pressure, but your blood pressure is not as low as it should be for good health.  Graphic 91075 Version 6.0  figure 1: Using a home blood pressure meter     This is an example of a person using a home blood pressure meter.  Graphic 404170 Version 1.0    table 2: 7-day diary for checking blood pressure at home  Day 1  Day 2  Day 3  Day 4  Day 5  Day 6  Day 7    Morning  1st read Morning  1st read Morning  1st read Morning  1st read Morning  1st read Morning  1st read Morning  1st read   Systolic: __________ Systolic: __________ Systolic: __________ Systolic: __________ Systolic: __________ Systolic: __________ Systolic: __________   Diastolic: __________ Diastolic: __________ Diastolic: __________ Diastolic: __________ Diastolic: __________ Diastolic: __________ Diastolic: __________   Pulse: __________ Pulse: __________ Pulse: __________ Pulse: __________ Pulse: __________ Pulse: __________ Pulse: __________   Morning  2nd read Morning  2nd read Morning  2nd read Morning  2nd read Morning  2nd read Morning  2nd read Morning  2nd read   Systolic: __________ Systolic: __________ Systolic: __________ Systolic: __________ Systolic: __________ Systolic: __________ Systolic: __________   Diastolic: __________ Diastolic: __________ Diastolic: __________ Diastolic: __________ Diastolic: __________ Diastolic: __________ Diastolic: __________ "   Pulse: __________ Pulse: __________ Pulse: __________ Pulse: __________ Pulse: __________ Pulse: __________ Pulse: __________   Evening  1st read Evening  1st read Evening  1st read Evening  1st read Evening  1st read Evening  1st read Evening  1st read   Systolic: __________ Systolic: __________ Systolic: __________ Systolic: __________ Systolic: __________ Systolic: __________ Systolic: __________   Diastolic: __________ Diastolic: __________ Diastolic: __________ Diastolic: __________ Diastolic: __________ Diastolic: __________ Diastolic: __________   Pulse: __________ Pulse: __________ Pulse: __________ Pulse: __________ Pulse: __________ Pulse: __________ Pulse: __________   Evening  2nd read Evening  2nd read Evening  2nd read Evening  2nd read Evening  2nd read Evening  2nd read Evening  2nd read   Systolic: __________ Systolic: __________ Systolic: __________ Systolic: __________ Systolic: __________ Systolic: __________ Systolic: __________   Diastolic: __________ Diastolic: __________ Diastolic: __________ Diastolic: __________ Diastolic: __________ Diastolic: __________ Diastolic: __________   Pulse: __________ Pulse: __________ Pulse: __________ Pulse: __________ Pulse: __________ Pulse: __________ Pulse: __________   Notes    Notes    Notes    Notes    Notes    Notes    Notes      ____________________ ____________________ ____________________ ____________________ ____________________ ____________________ ____________________   ____________________ ____________________ ____________________ ____________________ ____________________ ____________________ ____________________   ____________________ ____________________ ____________________ ____________________ ____________________ ____________________ ____________________   Patient name: ______________________________     Patient ID: ________________________________    Primary care provider: _______________________    Average BP:  _______________________________    Graphic 666805 Version 1.0  Consumer Information Use and Disclaimer   This information is not specific medical advice and does not replace information you receive from your health care provider. This is only a brief summary of general information. It does NOT include all information about conditions, illnesses, injuries, tests, procedures, treatments, therapies, discharge instructions or life-style choices that may apply to you. You must talk with your health care provider for complete information about your health and treatment options. This information should not be used to decide whether or not to accept your health care provider's advice, instructions or recommendations. Only your health care provider has the knowledge and training to provide advice that is right for you. The use of this information is governed by the [employer] End User License Agreement, available at [email or web address]://[email or web address].Tellyo/en/solutions/[employer]/about/ricardo.The use of [employer] content is governed by the [employer] Terms of Use. ©2021 UpToDate, Inc. All rights reserved.  Copyright   © 2021 UpToDate, Inc. and/or its affiliates. All rights reserved.

## 2023-09-24 NOTE — ASSESSMENT & PLAN NOTE
Blood pressure is not controlled.  Will change verapamil to 180 milligrams.  Recheck blood pressure in the next 2 to 4 weeks.

## 2023-12-04 ENCOUNTER — OFFICE VISIT (OUTPATIENT)
Dept: OBSTETRICS AND GYNECOLOGY | Facility: CLINIC | Age: 53
End: 2023-12-04
Payer: COMMERCIAL

## 2023-12-04 VITALS
BODY MASS INDEX: 36.17 KG/M2 | WEIGHT: 217.06 LBS | HEIGHT: 65 IN | SYSTOLIC BLOOD PRESSURE: 134 MMHG | DIASTOLIC BLOOD PRESSURE: 91 MMHG

## 2023-12-04 DIAGNOSIS — Z01.419 WELL WOMAN EXAM WITH ROUTINE GYNECOLOGICAL EXAM: Primary | ICD-10-CM

## 2023-12-04 DIAGNOSIS — Z12.31 BREAST CANCER SCREENING BY MAMMOGRAM: ICD-10-CM

## 2023-12-04 PROCEDURE — 88175 CYTOPATH C/V AUTO FLUID REDO: CPT | Performed by: STUDENT IN AN ORGANIZED HEALTH CARE EDUCATION/TRAINING PROGRAM

## 2023-12-04 PROCEDURE — 3080F PR MOST RECENT DIASTOLIC BLOOD PRESSURE >= 90 MM HG: ICD-10-PCS | Mod: CPTII,S$GLB,, | Performed by: STUDENT IN AN ORGANIZED HEALTH CARE EDUCATION/TRAINING PROGRAM

## 2023-12-04 PROCEDURE — 3044F HG A1C LEVEL LT 7.0%: CPT | Mod: CPTII,S$GLB,, | Performed by: STUDENT IN AN ORGANIZED HEALTH CARE EDUCATION/TRAINING PROGRAM

## 2023-12-04 PROCEDURE — 99386 PR PREVENTIVE VISIT,NEW,40-64: ICD-10-PCS | Mod: S$GLB,,, | Performed by: STUDENT IN AN ORGANIZED HEALTH CARE EDUCATION/TRAINING PROGRAM

## 2023-12-04 PROCEDURE — 3008F PR BODY MASS INDEX (BMI) DOCUMENTED: ICD-10-PCS | Mod: CPTII,S$GLB,, | Performed by: STUDENT IN AN ORGANIZED HEALTH CARE EDUCATION/TRAINING PROGRAM

## 2023-12-04 PROCEDURE — 87624 HPV HI-RISK TYP POOLED RSLT: CPT | Performed by: STUDENT IN AN ORGANIZED HEALTH CARE EDUCATION/TRAINING PROGRAM

## 2023-12-04 PROCEDURE — 3075F PR MOST RECENT SYSTOLIC BLOOD PRESS GE 130-139MM HG: ICD-10-PCS | Mod: CPTII,S$GLB,, | Performed by: STUDENT IN AN ORGANIZED HEALTH CARE EDUCATION/TRAINING PROGRAM

## 2023-12-04 PROCEDURE — 3080F DIAST BP >= 90 MM HG: CPT | Mod: CPTII,S$GLB,, | Performed by: STUDENT IN AN ORGANIZED HEALTH CARE EDUCATION/TRAINING PROGRAM

## 2023-12-04 PROCEDURE — 1159F PR MEDICATION LIST DOCUMENTED IN MEDICAL RECORD: ICD-10-PCS | Mod: CPTII,S$GLB,, | Performed by: STUDENT IN AN ORGANIZED HEALTH CARE EDUCATION/TRAINING PROGRAM

## 2023-12-04 PROCEDURE — 1159F MED LIST DOCD IN RCRD: CPT | Mod: CPTII,S$GLB,, | Performed by: STUDENT IN AN ORGANIZED HEALTH CARE EDUCATION/TRAINING PROGRAM

## 2023-12-04 PROCEDURE — 3044F PR MOST RECENT HEMOGLOBIN A1C LEVEL <7.0%: ICD-10-PCS | Mod: CPTII,S$GLB,, | Performed by: STUDENT IN AN ORGANIZED HEALTH CARE EDUCATION/TRAINING PROGRAM

## 2023-12-04 PROCEDURE — 3008F BODY MASS INDEX DOCD: CPT | Mod: CPTII,S$GLB,, | Performed by: STUDENT IN AN ORGANIZED HEALTH CARE EDUCATION/TRAINING PROGRAM

## 2023-12-04 PROCEDURE — 3075F SYST BP GE 130 - 139MM HG: CPT | Mod: CPTII,S$GLB,, | Performed by: STUDENT IN AN ORGANIZED HEALTH CARE EDUCATION/TRAINING PROGRAM

## 2023-12-04 PROCEDURE — 99386 PREV VISIT NEW AGE 40-64: CPT | Mod: S$GLB,,, | Performed by: STUDENT IN AN ORGANIZED HEALTH CARE EDUCATION/TRAINING PROGRAM

## 2023-12-04 RX ORDER — NORETHINDRONE ACETATE AND ETHINYL ESTRADIOL AND FERROUS FUMARATE 1MG-20(21)
1 KIT ORAL DAILY
Qty: 90 TABLET | Refills: 3 | Status: SHIPPED | OUTPATIENT
Start: 2023-12-04 | End: 2024-03-27

## 2023-12-04 NOTE — PATIENT INSTRUCTIONS
"[email or web address]    Persons nine to 18 years of age: 1,300 mg of calcium, 600 IU of vitamin D  Persons 19 to 50 years of age: 1,000 mg of calcium, 600 IU of vitamin D  Persons 51 to 70 years of age: 1,200 mg of calcium, 600 IU of vitamin D  Persons 71 years and older: 1,200 mg of calcium, 800 IU of vitamin D      - start strenuous exercise to stimulate endogenous testosterone production, as well as dopamine release from endorphins   - encourage manual stimulation, this will help learn to achieve orgasm on own as well as begin habits of regular sexual stimulation without pressure to perform in front of partner  - encourage increasing "romance" in relationship, needs to go on dates with partner  - discuss importance of sensual non-sexual touch with partner e.g. like massages, cuddling  - discuss non-penetrative, non-orgasm oriented sexual touch, take pressure off emphasis on penetrative intercourse          "

## 2023-12-04 NOTE — PROGRESS NOTES
History & Physical  Gynecology      SUBJECTIVE:     Chief Complaint: Well Woman       History of Present Illness:  Reports low libido, mood agitation, energy down, weight gain despite medication per PCP  Sister had breast cancer, ductal carcinoma, diagnosed late 40s    Menstrual History:ablation, no bleeding  Obstetric Hx: ,  Sexually Active: one male partner  Family history: below  Social: Wears seatbelts. Exercises no. Feels safe at home.   Last pap:  normal  Last mammo: 2023  Colonoscopy: 2019 due in 5 years  Flu: yes  Covid vaccine yes  Vitamins: no        Review of patient's allergies indicates:   Allergen Reactions    Capsicum Blisters     Topical - caused blisters    Codeine Anaphylaxis    Zosyn [piperacillin-tazobactam] Anaphylaxis       Past Medical History:   Diagnosis Date    Acid reflux     Benign hypertension 2023    Chronic migraine w/o aura w/o status migrainosus, not intractable 2017    Family history of colon cancer 2019    History of colonic polyps 2019    Interstitial cystitis 2014    Mood disorder 2019    Palpitations 2014    Post-menopausal 2019    RBBB 2014    Pt's Ep study is not c/w WPW or Brugada syndrome.  She has RBBB with short AL interval.    TMJ (temporomandibular joint disorder)     jaw clicking     Past Surgical History:   Procedure Laterality Date    ABDOMINAL SURGERY      APPENDECTOMY      ARTHROSCOPY OF KNEE Left 2018    Procedure: ARTHROSCOPY, KNEE;  Surgeon: Keltno Sandoval MD;  Location: [facility] OR [address] [place] [address];  Service: Orthopedics;  Laterality: Left;    CHOLECYSTECTOMY      COLONOSCOPY N/A 1/15/2019    Procedure: COLONOSCOPY;  Surgeon: Pamela Soriano MD;  Location: [facility];  Service: Endoscopy;  Laterality: N/A;    CYSTOSCOPY      ESOPHAGOGASTRODUODENOSCOPY N/A 1/15/2019    Procedure: EGD (ESOPHAGOGASTRODUODENOSCOPY);  Surgeon: Pamela Soriano MD;  Location: [facility];  Service: Endoscopy;  Laterality: N/A;     EXCISION OF MEDIAL MENISCUS OF KNEE Left 2018    Procedure: MENISCECTOMY, KNEE, MEDIAL PARTIAL;  Surgeon: Kelton Sandoval MD;  Location: [facility] OR [address] [place] [address];  Service: Orthopedics;  Laterality: Left;    RADIOFREQUENCY ABLATION       OB History          2    Para   2    Term   2            AB        Living             SAB        IAB        Ectopic        Multiple        Live Births                   Family History   Problem Relation Age of Onset    Cancer Father     Cancer Mother     Breast cancer Sister     No Known Problems Brother     No Known Problems Maternal Aunt     No Known Problems Maternal Uncle     No Known Problems Paternal Aunt     No Known Problems Paternal Uncle     No Known Problems Maternal Grandmother     No Known Problems Maternal Grandfather     No Known Problems Paternal Grandmother     No Known Problems Paternal Grandfather     Glaucoma Neg Hx     Amblyopia Neg Hx     Blindness Neg Hx     Cataracts Neg Hx     Diabetes Neg Hx     Hypertension Neg Hx     Macular degeneration Neg Hx     Retinal detachment Neg Hx     Strabismus Neg Hx     Stroke Neg Hx     Thyroid disease Neg Hx      Social History     Tobacco Use    Smoking status: Never    Smokeless tobacco: Never   Substance Use Topics    Alcohol use: Yes     Alcohol/week: 2.5 standard drinks of alcohol     Types: 3 Standard drinks or equivalent per week     Comment: socially    Drug use: No       Current Outpatient Medications   Medication Sig    verapamiL (CALAN-SR) 180 MG CR tablet Take 1 tablet (180 mg total) by mouth every evening.    norethindrone-ethinyl estradiol (JUNEL FE , ,) 1 mg-20 mcg (21)/75 mg (7) per tablet Take 1 tablet by mouth once daily. Take continuously. Skip placebo pills.     No current facility-administered medications for this visit.         Review of Systems:  Review of Systems   Constitutional:  Positive for unexpected weight change. Negative for activity change, appetite change and fever.    Respiratory:  Negative for shortness of breath.    Cardiovascular:  Negative for chest pain.   Gastrointestinal:  Negative for abdominal pain, blood in stool, constipation, diarrhea, nausea and vomiting.   Genitourinary:  Positive for decreased libido. Negative for dyspareunia, dysuria, hematuria, pelvic pain, vaginal bleeding, vaginal discharge, vaginal pain, urinary incontinence, postcoital bleeding, postmenopausal bleeding and vaginal odor.   Integumentary:  Negative for breast mass.   Psychiatric/Behavioral:  The patient is nervous/anxious.    Breast: Negative for lump and mass       OBJECTIVE:     Physical Exam:  Physical Exam  Vitals reviewed.   Constitutional:       General: She is not in acute distress.     Appearance: She is well-developed. She is not diaphoretic.   HENT:      Head: Normocephalic and atraumatic.   Eyes:      General: No scleral icterus.        Right eye: No discharge.         Left eye: No discharge.      Conjunctiva/sclera: Conjunctivae normal.   Neck:      Thyroid: No thyromegaly.   Cardiovascular:      Rate and Rhythm: Normal rate.   Pulmonary:      Effort: Pulmonary effort is normal.   Chest:   Breasts:     Breasts are symmetrical.      Right: No inverted nipple, mass, nipple discharge, skin change or tenderness.      Left: No inverted nipple, mass, nipple discharge, skin change or tenderness.   Abdominal:      General: There is no distension.      Palpations: Abdomen is soft.      Tenderness: There is no abdominal tenderness.   Genitourinary:     Labia:         Right: No rash, tenderness, lesion or injury.         Left: No rash, tenderness, lesion or injury.       Vagina: Normal. No signs of injury and foreign body. No vaginal discharge, erythema, tenderness or bleeding.      Cervix: No cervical motion tenderness, discharge or friability.      Uterus: Not deviated, not enlarged, not fixed and not tender.       Adnexa:         Right: No mass, tenderness or fullness.          Left: No  mass, tenderness or fullness.     Musculoskeletal:         General: Normal range of motion.      Cervical back: Normal range of motion and neck supple.   Lymphadenopathy:      Cervical: No cervical adenopathy.   Skin:     General: Skin is warm and dry.      Findings: No erythema or rash.   Neurological:      Mental Status: She is alert and oriented to person, place, and time.           ASSESSMENT:       ICD-10-CM ICD-9-CM    1. Well woman exam with routine gynecological exam  Z01.419 V72.31 Liquid-Based Pap Smear, Screening      HPV High Risk Genotypes, PCR      Mammo Digital Screening Bilat w/ Hill      2. Breast cancer screening by mammogram  Z12.31 V76.12 Mammo Digital Screening Bilat w/ Hill             Plan:      WWE  - Vaccines utd  - Pap and cotest collected  - Mammogram ordered  - colonoscopy utd  - GC/CT, affirm n/a  - Daily vitamin discussed.  - CBE normal. Physical exam normal. VSS.  - Reviewed options for menopausal symptoms control, libido, weight gain. Pt to try low dose cOCP  - RTC for annual or PRN.     Counseling time: 30 minutes    Ana Valentin

## 2023-12-08 LAB
HPV HR 12 DNA SPEC QL NAA+PROBE: NEGATIVE
HPV16 AG SPEC QL: NEGATIVE
HPV18 DNA SPEC QL NAA+PROBE: NEGATIVE

## 2023-12-13 LAB
FINAL PATHOLOGIC DIAGNOSIS: NORMAL
Lab: NORMAL

## 2024-01-22 ENCOUNTER — OFFICE VISIT (OUTPATIENT)
Dept: OPTOMETRY | Facility: CLINIC | Age: 54
End: 2024-01-22
Payer: COMMERCIAL

## 2024-01-22 DIAGNOSIS — Z97.3 WEARS CONTACT LENSES: ICD-10-CM

## 2024-01-22 DIAGNOSIS — H52.7 REFRACTIVE ERROR: ICD-10-CM

## 2024-01-22 DIAGNOSIS — Z46.0 ENCOUNTER FOR FITTING OR ADJUSTMENT OF SPECTACLES OR CONTACT LENSES: Primary | ICD-10-CM

## 2024-01-22 DIAGNOSIS — Z01.00 EXAMINATION OF EYES AND VISION: Primary | ICD-10-CM

## 2024-01-22 PROCEDURE — 99499 UNLISTED E&M SERVICE: CPT | Mod: ,,, | Performed by: OPTOMETRIST

## 2024-01-22 PROCEDURE — 92310 CONTACT LENS FITTING OU: CPT | Mod: CSM,S$GLB,, | Performed by: OPTOMETRIST

## 2024-01-22 PROCEDURE — 99999 PR PBB SHADOW E&M-EST. PATIENT-LVL II: CPT | Mod: PBBFAC,,, | Performed by: OPTOMETRIST

## 2024-01-22 PROCEDURE — 92004 COMPRE OPH EXAM NEW PT 1/>: CPT | Mod: S$GLB,,, | Performed by: OPTOMETRIST

## 2024-01-22 PROCEDURE — 92015 DETERMINE REFRACTIVE STATE: CPT | Mod: S$GLB,,, | Performed by: OPTOMETRIST

## 2024-01-22 NOTE — PROGRESS NOTES
Subjective:       Patient ID: Jennifer Bonilla is a 53 y.o. female      Chief Complaint   Patient presents with    Concerns About Ocular Health    Hypertensive Eye Exam     History of Present Illness  Dls: 10/31/19 Dr. Parkinson     52 y/o female presents today for ocular health check.  Pt states no changes in vision. Pt wears scls and pal's.     No tearing  No itching  No burning  No pain  No ha's  No floaters  No flashes    Eye meds  None    Pohx:   None    Fohx:   None    Wearing ultra MF, pt states having some itchiness end of day with lenses, replacing every 2 weeks. does not sleep in lenses       Assessment/Plan:     1. Examination of eyes and vision  [facility]    2. Refractive error  Educated patient on refractive error and discussed lens options. Dispensed updated spectacle Rx. Educated about adaptation period to new specs.    Eyeglass Final Rx       Eyeglass Final Rx         Sphere Cylinder Add    Right -1.00 Sphere +1.75    Left -0.25 Sphere +1.75      Expiration Date: 1/22/2025                      3. Wears contact lenses  Pt c/o itchy eyes with ultra MF lenses, wants to try different monthly MF, trial Air Optix in office with good comfort.     Contact lens Rx released to pt. Daily wear only advised, replacement monthly with education to risks of extended wear. Advised against sleeping, swimming, showering in lenses. Okay to order if happy with comfort and [facility]. Discussed lens care, compliance and solutions. RTC yearly contact lens health check.     Contact Lens Final Rx       Final Contact Lens Rx         Brand Base Curve Diameter Sphere Cylinder Add    Right Air Optix Plus Hydradrglyde Mulitfocal  8.6 14.2 -1.00 Sphere LOW    Left Air Optix Plus Hydradrglyde Mulitfocal  8.6 14.2 -0.25 Sphere LOW      Expiration Date: 1/22/2025    Replacement: Monthly    Solutions: OptiFree PureMoist    Wearing Schedule: Daily Wear                      Follow up in about 1 year (around 1/22/2025) for Annual eye exam, Contact  Lens.

## 2024-01-24 ENCOUNTER — PATIENT MESSAGE (OUTPATIENT)
Dept: OPTOMETRY | Facility: CLINIC | Age: 54
End: 2024-01-24
Payer: COMMERCIAL

## 2024-02-22 DIAGNOSIS — M67.961 UNSPECIFIED DISORDER OF SYNOVIUM AND TENDON, RIGHT LOWER LEG: Primary | ICD-10-CM

## 2024-02-23 RX ORDER — VERAPAMIL HYDROCHLORIDE 180 MG/1
180 CAPSULE, EXTENDED RELEASE ORAL
Qty: 30 CAPSULE | Refills: 0 | Status: SHIPPED | OUTPATIENT
Start: 2024-02-23 | End: 2024-05-03 | Stop reason: SDUPTHER

## 2024-02-23 NOTE — TELEPHONE ENCOUNTER
Patient missed follow up appt. One time 30 day fill only. Will need to be seen prior to that expiring, with either me or NP

## 2024-02-23 NOTE — TELEPHONE ENCOUNTER
Patient has been notified that Dr. Abdi has sent in a 30 day refill and that she needs to be seen in order to continue to receive refills. I was in the process of scheduling the patient and she stated that she would call us back on Monday to schedule. I again reminded her that she would not be receiving anymore refills without being seen. She verbalized understanding and said that [facility]

## 2024-02-26 ENCOUNTER — TELEPHONE (OUTPATIENT)
Dept: OBSTETRICS AND GYNECOLOGY | Facility: CLINIC | Age: 54
End: 2024-02-26
Payer: COMMERCIAL

## 2024-02-26 PROBLEM — M25.671 DECREASED RANGE OF MOTION OF RIGHT ANKLE: Status: ACTIVE | Noted: 2024-02-26

## 2024-02-26 PROBLEM — R29.898 DECREASED STRENGTH OF LOWER EXTREMITY: Status: ACTIVE | Noted: 2024-02-26

## 2024-02-26 NOTE — TELEPHONE ENCOUNTER
----- Message from Khang Ulrich sent at 2/26/2024 10:57 AM CST -----  Name of Who is Calling:MCKENNA ROMAN [[id number]]                What is the request in detail: Pt calling because that she has been bleeding everyday since been new birthcontrol and she want to know what else can she do because she never had a cycle in years.Please call back to further assist.                 Can the clinic reply by [facility]: No                What Number to Call Back if not in [facility]: [phone]

## 2024-03-18 ENCOUNTER — TELEPHONE (OUTPATIENT)
Dept: OBSTETRICS AND GYNECOLOGY | Facility: CLINIC | Age: 54
End: 2024-03-18
Payer: COMMERCIAL

## 2024-03-18 NOTE — TELEPHONE ENCOUNTER
Spoke to pt in regards to scheduling apt pt vu     ----- Message from Kristel Fox sent at 3/18/2024 10:18 AM CDT -----  Regarding: medical advise  Name of caller: zak       What is the requesting detail: pt states the birth is causing her bleeding every day. Does not think she wants to continue. Please advise       Can the clinic reply by [facility]:       What number to call back: [phone]

## 2024-03-27 ENCOUNTER — OFFICE VISIT (OUTPATIENT)
Dept: OBSTETRICS AND GYNECOLOGY | Facility: CLINIC | Age: 54
End: 2024-03-27
Payer: COMMERCIAL

## 2024-03-27 VITALS
DIASTOLIC BLOOD PRESSURE: 94 MMHG | BODY MASS INDEX: 34.82 KG/M2 | HEIGHT: 65 IN | SYSTOLIC BLOOD PRESSURE: 146 MMHG | WEIGHT: 209 LBS

## 2024-03-27 DIAGNOSIS — N95.0 PMB (POSTMENOPAUSAL BLEEDING): Primary | ICD-10-CM

## 2024-03-27 DIAGNOSIS — N95.1 MENOPAUSAL SYMPTOM: ICD-10-CM

## 2024-03-27 PROCEDURE — 1159F MED LIST DOCD IN RCRD: CPT | Mod: CPTII,S$GLB,, | Performed by: STUDENT IN AN ORGANIZED HEALTH CARE EDUCATION/TRAINING PROGRAM

## 2024-03-27 PROCEDURE — 3008F BODY MASS INDEX DOCD: CPT | Mod: CPTII,S$GLB,, | Performed by: STUDENT IN AN ORGANIZED HEALTH CARE EDUCATION/TRAINING PROGRAM

## 2024-03-27 PROCEDURE — 99214 OFFICE O/P EST MOD 30 MIN: CPT | Mod: S$GLB,,, | Performed by: STUDENT IN AN ORGANIZED HEALTH CARE EDUCATION/TRAINING PROGRAM

## 2024-03-27 PROCEDURE — 99999 PR PBB SHADOW E&M-EST. PATIENT-LVL III: CPT | Mod: PBBFAC,,, | Performed by: STUDENT IN AN ORGANIZED HEALTH CARE EDUCATION/TRAINING PROGRAM

## 2024-03-27 PROCEDURE — 3080F DIAST BP >= 90 MM HG: CPT | Mod: CPTII,S$GLB,, | Performed by: STUDENT IN AN ORGANIZED HEALTH CARE EDUCATION/TRAINING PROGRAM

## 2024-03-27 PROCEDURE — 3077F SYST BP >= 140 MM HG: CPT | Mod: CPTII,S$GLB,, | Performed by: STUDENT IN AN ORGANIZED HEALTH CARE EDUCATION/TRAINING PROGRAM

## 2024-03-27 NOTE — PROGRESS NOTES
History & Physical  Gynecology      SUBJECTIVE:     Chief Complaint: No chief complaint on file.       History of Present Illness:  Five months ago, Jennifer came in for her annual and reported low libido, mood agitation, energy down, weight gain despite medication per PCP  Sister had breast cancer, ductal carcinoma, diagnosed late 40s  Menstrual History:ablation, no bleeding sice  Obstetric Hx: ,  Sexually Active: one male partner    She was started on junel and started bleeding every day since. She was taking it continuously.  She stopped it  and hasn't bled since. She did feel like her mental fog and energy were better when she was on the pill, however    Review of patient's allergies indicates:   Allergen Reactions    Capsicum Blisters     Topical - caused blisters    Codeine Anaphylaxis    Zosyn [piperacillin-tazobactam] Anaphylaxis       Past Medical History:   Diagnosis Date    Acid reflux     Benign hypertension 2023    Chronic migraine w/o aura w/o status migrainosus, not intractable 2017    Family history of colon cancer 2019    History of colonic polyps 2019    Interstitial cystitis 2014    Mood disorder 2019    Palpitations 2014    Post-menopausal 2019    RBBB 2014    Pt's Ep study is not c/w WPW or Brugada syndrome.  She has RBBB with short TX interval.    TMJ (temporomandibular joint disorder)     jaw clicking     Past Surgical History:   Procedure Laterality Date    ABDOMINAL SURGERY      APPENDECTOMY      ARTHROSCOPY OF KNEE Left 2018    Procedure: ARTHROSCOPY, KNEE;  Surgeon: Kelton Sandoval MD;  Location: [facility] OR [address] [place] [address];  Service: Orthopedics;  Laterality: Left;    CHOLECYSTECTOMY      COLONOSCOPY N/A 1/15/2019    Procedure: COLONOSCOPY;  Surgeon: Pamela Soriano MD;  Location: [facility];  Service: Endoscopy;  Laterality: N/A;    CYSTOSCOPY      ESOPHAGOGASTRODUODENOSCOPY N/A 1/15/2019    Procedure: EGD  (ESOPHAGOGASTRODUODENOSCOPY);  Surgeon: Pamela Soriano MD;  Location: [facility];  Service: Endoscopy;  Laterality: N/A;    EXCISION OF MEDIAL MENISCUS OF KNEE Left 2018    Procedure: MENISCECTOMY, KNEE, MEDIAL PARTIAL;  Surgeon: Kelton Sandoval MD;  Location: [facility] OR [address] [place] [address];  Service: Orthopedics;  Laterality: Left;    RADIOFREQUENCY ABLATION       OB History          2    Para   2    Term   2            AB        Living             SAB        IAB        Ectopic        Multiple        Live Births                   Family History   Problem Relation Age of Onset    Cancer Father     Cancer Mother     Breast cancer Sister     No Known Problems Brother     No Known Problems Maternal Aunt     No Known Problems Maternal Uncle     No Known Problems Paternal Aunt     No Known Problems Paternal Uncle     No Known Problems Maternal Grandmother     No Known Problems Maternal Grandfather     No Known Problems Paternal Grandmother     No Known Problems Paternal Grandfather     Glaucoma Neg Hx     Amblyopia Neg Hx     Blindness Neg Hx     Cataracts Neg Hx     Diabetes Neg Hx     Hypertension Neg Hx     Macular degeneration Neg Hx     Retinal detachment Neg Hx     Strabismus Neg Hx     Stroke Neg Hx     Thyroid disease Neg Hx      Social History     Tobacco Use    Smoking status: Never    Smokeless tobacco: Never   Substance Use Topics    Alcohol use: Yes     Alcohol/week: 2.5 standard drinks of alcohol     Types: 3 Standard drinks or equivalent per week     Comment: socially    Drug use: No       Current Outpatient Medications   Medication Sig    norethindrone-ethinyl estradiol (JUNEL FE , ,) 1 mg-20 mcg (21)/75 mg (7) per tablet Take 1 tablet by mouth once daily. Take continuously. Skip placebo pills.    verapamiL (CALAN-SR) 180 MG CR tablet Take 1 tablet (180 mg total) by mouth every evening.    verapamiL (VERELAN) 180 MG C24P TAKE 1 CAPSULE BY MOUTH IN THE EVENING     No current  facility-administered medications for this visit.         Review of Systems:  Review of Systems   Constitutional:  Negative for fever and unexpected weight change.   Gastrointestinal:  Positive for abdominal pain.   Genitourinary:  Positive for menstrual problem and postmenopausal bleeding. Negative for hot flashes.        OBJECTIVE:     Physical Exam:  Physical Exam  Vitals reviewed.   Constitutional:       General: She is not in acute distress.     Appearance: She is well-developed. She is not diaphoretic.   HENT:      Head: Normocephalic and atraumatic.   Eyes:      Conjunctiva/sclera: Conjunctivae normal.   Cardiovascular:      Rate and Rhythm: Normal rate.   Pulmonary:      Effort: Pulmonary effort is normal.   Musculoskeletal:         General: Normal range of motion.      Cervical back: Normal range of motion.   Skin:     General: Skin is warm and dry.   Neurological:      Mental Status: She is alert and oriented to person, place, and time.           ASSESSMENT:       ICD-10-CM ICD-9-CM    1. PMB (postmenopausal bleeding)  N95.0 627.1              Plan:   A full discussion of the benefit-risk ratio of hormonal replacement therapy was carried out. Improvement in vasomotor and other climacteric symptoms is discussed, including possible improvements in sleep and mood. Reduction of risk for osteoporosis was explained. We discussed the study data showing increased risk of thrombo-embolic events such as myocardial infarction, stroke and also possibly breast cancer with estrogen replacement, and how this might affect her. The range of side effects such as breast tenderness, weight gain and including possible increases in lifetime risk of breast cancer and possible thrombotic complications was discussed. We also discussed ACOG's recommendation to use hormone replacement therapy for the relief of hot flashes alone and to be on the lowest dose possible for the shortest amount of time. We reviewed alternative methods  that have been shown to improve symptoms as well including gabapentin, SSRI, SNRI, clonidine, etc.  Alternative such as herbal and soy-based products were reviewed as well as mind body green metabolism plus, smart press juice cleanser, ARMRA Bovine colostrum.  Alternative Rx such as SSRI, Gabapentin, Clonidine reviewed   Pt would like to try low dose patch. Counseled to change twice a week and not to place on breast tissue  RTC 3-4 weeks for follow up    Face to Face time with patient: 20    30 minutes of total time spent on the encounter, which includes face to face time and non-face to face time preparing to see the patient (eg, review of tests), Obtaining and/or reviewing separately obtained history, Documenting clinical information in the electronic or other health record, Independently interpreting results (not separately reported) and communicating results to the patient/family/caregiver, or Care coordination (not separately reported).      Ana Valentin

## 2024-05-03 ENCOUNTER — OFFICE VISIT (OUTPATIENT)
Dept: FAMILY MEDICINE | Facility: CLINIC | Age: 54
End: 2024-05-03
Payer: COMMERCIAL

## 2024-05-03 ENCOUNTER — LAB VISIT (OUTPATIENT)
Dept: LAB | Facility: HOSPITAL | Age: 54
End: 2024-05-03
Attending: FAMILY MEDICINE
Payer: COMMERCIAL

## 2024-05-03 VITALS
TEMPERATURE: 98 F | SYSTOLIC BLOOD PRESSURE: 135 MMHG | HEIGHT: 65 IN | DIASTOLIC BLOOD PRESSURE: 86 MMHG | HEART RATE: 63 BPM | BODY MASS INDEX: 34.78 KG/M2 | WEIGHT: 208.75 LBS | OXYGEN SATURATION: 97 %

## 2024-05-03 DIAGNOSIS — Z00.00 ROUTINE MEDICAL EXAM: Primary | ICD-10-CM

## 2024-05-03 DIAGNOSIS — Z12.31 ENCOUNTER FOR SCREENING MAMMOGRAM FOR MALIGNANT NEOPLASM OF BREAST: ICD-10-CM

## 2024-05-03 DIAGNOSIS — I10 BENIGN HYPERTENSION: Chronic | ICD-10-CM

## 2024-05-03 DIAGNOSIS — E66.09 CLASS 1 OBESITY DUE TO EXCESS CALORIES WITH SERIOUS COMORBIDITY AND BODY MASS INDEX (BMI) OF 34.0 TO 34.9 IN ADULT: Chronic | ICD-10-CM

## 2024-05-03 DIAGNOSIS — Z23 NEED FOR VACCINATION: ICD-10-CM

## 2024-05-03 LAB
ALBUMIN SERPL BCP-MCNC: 3.9 G/DL (ref 3.5–5.2)
ALP SERPL-CCNC: 66 U/L (ref 55–135)
ALT SERPL W/O P-5'-P-CCNC: 32 U/L (ref 10–44)
ANION GAP SERPL CALC-SCNC: 7 MMOL/L (ref 8–16)
AST SERPL-CCNC: 19 U/L (ref 10–40)
BASOPHILS # BLD AUTO: 0.07 K/UL (ref 0–0.2)
BASOPHILS NFR BLD: 0.8 % (ref 0–1.9)
BILIRUB SERPL-MCNC: 0.4 MG/DL (ref 0.1–1)
BUN SERPL-MCNC: 16 MG/DL (ref 6–20)
CALCIUM SERPL-MCNC: 9.8 MG/DL (ref 8.7–10.5)
CHLORIDE SERPL-SCNC: 110 MMOL/L (ref 95–110)
CHOLEST SERPL-MCNC: 181 MG/DL (ref 120–199)
CHOLEST/HDLC SERPL: 3.7 {RATIO} (ref 2–5)
CO2 SERPL-SCNC: 24 MMOL/L (ref 23–29)
CREAT SERPL-MCNC: 1 MG/DL (ref 0.5–1.4)
DIFFERENTIAL METHOD BLD: NORMAL
EOSINOPHIL # BLD AUTO: 0.5 K/UL (ref 0–0.5)
EOSINOPHIL NFR BLD: 5.5 % (ref 0–8)
ERYTHROCYTE [DISTWIDTH] IN BLOOD BY AUTOMATED COUNT: 13.8 % (ref 11.5–14.5)
EST. GFR  (NO RACE VARIABLE): >60 ML/MIN/1.73 M^2
ESTIMATED AVG GLUCOSE: 105 MG/DL (ref 68–131)
GLUCOSE SERPL-MCNC: 92 MG/DL (ref 70–110)
HBA1C MFR BLD: 5.3 % (ref 4–5.6)
HCT VFR BLD AUTO: 45 % (ref 37–48.5)
HDLC SERPL-MCNC: 49 MG/DL (ref 40–75)
HDLC SERPL: 27.1 % (ref 20–50)
HGB BLD-MCNC: 14.6 G/DL (ref 12–16)
IMM GRANULOCYTES # BLD AUTO: 0.03 K/UL (ref 0–0.04)
IMM GRANULOCYTES NFR BLD AUTO: 0.4 % (ref 0–0.5)
LDLC SERPL CALC-MCNC: 116 MG/DL (ref 63–159)
LYMPHOCYTES # BLD AUTO: 3.1 K/UL (ref 1–4.8)
LYMPHOCYTES NFR BLD: 36.4 % (ref 18–48)
MCH RBC QN AUTO: 29.1 PG (ref 27–31)
MCHC RBC AUTO-ENTMCNC: 32.4 G/DL (ref 32–36)
MCV RBC AUTO: 90 FL (ref 82–98)
MONOCYTES # BLD AUTO: 0.7 K/UL (ref 0.3–1)
MONOCYTES NFR BLD: 8.8 % (ref 4–15)
NEUTROPHILS # BLD AUTO: 4 K/UL (ref 1.8–7.7)
NEUTROPHILS NFR BLD: 48.1 % (ref 38–73)
NONHDLC SERPL-MCNC: 132 MG/DL
NRBC BLD-RTO: 0 /100 WBC
PLATELET # BLD AUTO: 255 K/UL (ref 150–450)
PMV BLD AUTO: 12 FL (ref 9.2–12.9)
POTASSIUM SERPL-SCNC: 5.1 MMOL/L (ref 3.5–5.1)
PROT SERPL-MCNC: 7.1 G/DL (ref 6–8.4)
RBC # BLD AUTO: 5.02 M/UL (ref 4–5.4)
SODIUM SERPL-SCNC: 141 MMOL/L (ref 136–145)
TRIGL SERPL-MCNC: 80 MG/DL (ref 30–150)
WBC # BLD AUTO: 8.39 K/UL (ref 3.9–12.7)

## 2024-05-03 PROCEDURE — 3079F DIAST BP 80-89 MM HG: CPT | Mod: CPTII,S$GLB,, | Performed by: FAMILY MEDICINE

## 2024-05-03 PROCEDURE — 83036 HEMOGLOBIN GLYCOSYLATED A1C: CPT | Performed by: FAMILY MEDICINE

## 2024-05-03 PROCEDURE — 99999 PR PBB SHADOW E&M-EST. PATIENT-LVL IV: CPT | Mod: PBBFAC,,, | Performed by: FAMILY MEDICINE

## 2024-05-03 PROCEDURE — 80061 LIPID PANEL: CPT | Performed by: FAMILY MEDICINE

## 2024-05-03 PROCEDURE — 90750 HZV VACC RECOMBINANT IM: CPT | Mod: S$GLB,,, | Performed by: FAMILY MEDICINE

## 2024-05-03 PROCEDURE — 90471 IMMUNIZATION ADMIN: CPT | Mod: S$GLB,,, | Performed by: FAMILY MEDICINE

## 2024-05-03 PROCEDURE — 99396 PREV VISIT EST AGE 40-64: CPT | Mod: 25,S$GLB,, | Performed by: FAMILY MEDICINE

## 2024-05-03 PROCEDURE — 3044F HG A1C LEVEL LT 7.0%: CPT | Mod: CPTII,S$GLB,, | Performed by: FAMILY MEDICINE

## 2024-05-03 PROCEDURE — 36415 COLL VENOUS BLD VENIPUNCTURE: CPT | Mod: PN | Performed by: FAMILY MEDICINE

## 2024-05-03 PROCEDURE — 1159F MED LIST DOCD IN RCRD: CPT | Mod: CPTII,S$GLB,, | Performed by: FAMILY MEDICINE

## 2024-05-03 PROCEDURE — 1160F RVW MEDS BY RX/DR IN RCRD: CPT | Mod: CPTII,S$GLB,, | Performed by: FAMILY MEDICINE

## 2024-05-03 PROCEDURE — 4010F ACE/ARB THERAPY RXD/TAKEN: CPT | Mod: CPTII,S$GLB,, | Performed by: FAMILY MEDICINE

## 2024-05-03 PROCEDURE — 3008F BODY MASS INDEX DOCD: CPT | Mod: CPTII,S$GLB,, | Performed by: FAMILY MEDICINE

## 2024-05-03 PROCEDURE — 85025 COMPLETE CBC W/AUTO DIFF WBC: CPT | Performed by: FAMILY MEDICINE

## 2024-05-03 PROCEDURE — 80053 COMPREHEN METABOLIC PANEL: CPT | Performed by: FAMILY MEDICINE

## 2024-05-03 PROCEDURE — 3075F SYST BP GE 130 - 139MM HG: CPT | Mod: CPTII,S$GLB,, | Performed by: FAMILY MEDICINE

## 2024-05-03 RX ORDER — VERAPAMIL HYDROCHLORIDE 180 MG/1
180 CAPSULE, EXTENDED RELEASE ORAL DAILY
Qty: 90 CAPSULE | Refills: 2 | Status: SHIPPED | OUTPATIENT
Start: 2024-05-03

## 2024-05-03 RX ORDER — VALSARTAN 40 MG/1
40 TABLET ORAL DAILY
Qty: 90 TABLET | Refills: 0 | Status: SHIPPED | OUTPATIENT
Start: 2024-05-03 | End: 2025-05-03

## 2024-05-03 NOTE — PROGRESS NOTES
IM shingles dose 1 vaccine injection administered as ordered. Patient tolerated well without difficulty. Vis  provided to patient.

## 2024-05-03 NOTE — PROGRESS NOTES
"  Patient Name: Jennifer Bonilla    : 1970  MRN: [id number]      Subjective:     Patient ID: Jennifer is a 53 y.o. female    Chief Complaint:  Annual Exam    History of Present Illness  The patient presents for health maintenance and follow-up on hypertension.  Currently taking verapamil 180 milligrams daily.      Review of Systems   Constitutional:  Negative for unexpected weight change.   HENT:  Negative for ear pain and sore throat.    Eyes:  Negative for visual disturbance.   Respiratory:  Negative for shortness of breath.    Cardiovascular:  Negative for chest pain.   Gastrointestinal:  Negative for abdominal pain and blood in stool.   Genitourinary:  Negative for dysuria and frequency.   Integumentary:  Negative for rash.   Neurological:  Negative for weakness, numbness and headaches.   Hematological:  Negative for adenopathy.   Psychiatric/Behavioral:  Negative for suicidal ideas.         Objective:   /86 (BP Location: Left arm, Patient Position: Sitting, BP Method: Large (Automatic))   Pulse 63   Temp 98.4 °F (36.9 °C) (Oral)   Ht 5' 5" (1.651 m)   Wt 94.7 kg (208 lb 12.4 oz)   SpO2 97%   BMI 34.74 kg/m²     Physical Exam    Physical Exam  Vitals reviewed.   Constitutional:       General: She is not in acute distress.     Appearance: She is obese.   HENT:      Head: Normocephalic and atraumatic.      Right Ear: Ear canal and external ear normal.      Left Ear: Ear canal and external ear normal.      Nose: Nose normal.      Mouth/Throat:      Mouth: Mucous membranes are moist.      Pharynx: No oropharyngeal exudate or posterior oropharyngeal erythema.   Eyes:      Extraocular Movements: Extraocular movements intact.      Conjunctiva/sclera: Conjunctivae normal.      Pupils: Pupils are equal, round, and reactive to light.   Cardiovascular:      Rate and Rhythm: Normal rate and regular rhythm.      Pulses: Normal pulses.      Heart sounds: Normal heart sounds.   Pulmonary:      Effort: " Pulmonary effort is normal. No respiratory distress.      Breath sounds: No wheezing or rales.   Abdominal:      General: Abdomen is flat. Bowel sounds are normal. There is no distension.      Palpations: Abdomen is soft.      Tenderness: There is no abdominal tenderness. There is no guarding.   Musculoskeletal:      Cervical back: Normal range of motion. No rigidity or tenderness.   Lymphadenopathy:      Cervical: No cervical adenopathy.   Skin:     General: Skin is warm.      Capillary Refill: Capillary refill takes less than 2 seconds.   Neurological:      Mental Status: She is alert and oriented to person, place, and time.      Cranial Nerves: No cranial nerve deficit.      Sensory: No sensory deficit.   Psychiatric:         Mood and Affect: Mood normal.         Behavior: Behavior normal.            Assessment        ICD-10-CM ICD-9-CM   1. Routine medical exam  Z00.00 V70.0   2. Encounter for screening mammogram for malignant neoplasm of breast  Z12.31 V76.12   3. Need for vaccination  Z23 V05.9   4. Benign hypertension  I10 401.1   5. Class 1 obesity due to excess calories with serious comorbidity and body mass index (BMI) of 34.0 to 34.9 in adult  E66.09 278.00    Z68.34 V85.34         Plan:     Assessment & Plan  1. Health maintenance.  Age appropriate screenings, vaccinations, and recommendations reviewed and discussed.  Appropriate orders placed and previous results reviewed.  First dose of shingles vaccination done today.  Next dose in six months.  Mammogram will be scheduled for June.    2. Hypertension  Blood pressure is not at goal.  Unable to increase verapamil further due to pulse being in the 60s.  Will continue verapamil 180 milligrams daily and add valsartan 40 milligrams daily.  Recheck blood pressure in the next two weeks along with basic metabolic panel.    3. Obesity  The patient's BMI has been recorded in the chart. The patient has been provided educational materials regarding the benefits of  "attaining and maintaining a normal weight. We will continue to address and follow this issue during follow up visits.     ORDERS  1. Routine medical exam  -     Comprehensive Metabolic Panel; Future; Expected date: 05/03/2024  -     Lipid Panel; Future; Expected date: 05/03/2024  -     CBC Auto Differential; Future; Expected date: 05/03/2024  -     Hemoglobin A1C; Future; Expected date: 05/03/2024    2. Encounter for screening mammogram for malignant neoplasm of breast  -     Mammo Digital Screening Bilat w/ Hill; Future; Expected date: 06/12/2024    3. Need for vaccination  -     varicella-zoster GE-AS01B (PF)(SHINGRIX) 50 mcg/0.5 mL KIT    4. Benign hypertension  -     verapamiL (VERELAN) 180 MG C24P; Take 1 capsule (180 mg total) by mouth once daily.  Dispense: 90 capsule; Refill: 2  -     valsartan (DIOVAN) 40 MG tablet; Take 1 tablet (40 mg total) by mouth once daily.  Dispense: 90 tablet; Refill: 0  -     Comprehensive Metabolic Panel; Future; Expected date: 05/03/2024  -     Lipid Panel; Future; Expected date: 05/03/2024  -     CBC Auto Differential; Future; Expected date: 05/03/2024  -     Hemoglobin A1C; Future; Expected date: 05/03/2024  -     Cancel: BASIC METABOLIC PANEL; Future; Expected date: 05/03/2024  -     BASIC METABOLIC PANEL; Future; Expected date: 05/17/2024  -     Comprehensive Metabolic Panel; Future; Expected date: 11/03/2024  -     Hypertension Digital Medicine (HDMP) Enrollment Order    5. Class 1 obesity due to excess calories with serious comorbidity and body mass index (BMI) of 34.0 to 34.9 in adult  Assessment & Plan:  Wt Readings from Last 3 Encounters:   05/03/24 0825 94.7 kg (208 lb 12.4 oz)   03/27/24 1437 94.8 kg (208 lb 15.9 oz)   12/04/23 1327 98.5 kg (217 lb 0.7 oz)      Estimated body mass index is 34.74 kg/m² as calculated from the following:    Height as of this encounter: 5' 5" (1.651 m).    Weight as of this encounter: 94.7 kg (208 lb 12.4 oz).  Ideal body weight: 57 kg " (125 lb 10.6 oz)     The patient's BMI has been recorded in the chart. The patient has been provided educational materials regarding the benefits of attaining and maintaining a normal weight. We will continue to address and follow this issue during follow up visits.                -Bossman Abdi Jr., MD, AAHIVS      This note was generated with the assistance of ambient listening technology. Verbal consent was obtained by the patient and accompanying visitor(s) for the recording of patient appointment to facilitate this note. I attest to having reviewed and edited the generated note for accuracy, though some syntax or spelling errors may persist. Please contact the author of this note for any clarification.          There are no Patient Instructions on file for this visit.    Follow Up  No follow-ups on file.    Future Appointments   Date Time Provider Department [place]   5/8/2024  9:40 AM Claudine Hernandez [facility] OP [facility]   5/20/2024  9:00 AM NURSE, [facility]   5/20/2024  9:30 AM LAB, [facility]   6/25/2024  9:40 AM [facility]   10/29/2024  8:00 AM LAB, [facility]   11/5/2024  9:20 AM Bossman Abdi Jr., MD [facility]

## 2024-05-04 PROBLEM — E66.01 CLASS 2 SEVERE OBESITY DUE TO EXCESS CALORIES WITH SERIOUS COMORBIDITY AND BODY MASS INDEX (BMI) OF 36.0 TO 36.9 IN ADULT: Chronic | Status: RESOLVED | Noted: 2023-05-13 | Resolved: 2024-05-04

## 2024-05-04 PROBLEM — E66.09 CLASS 1 OBESITY DUE TO EXCESS CALORIES WITH SERIOUS COMORBIDITY AND BODY MASS INDEX (BMI) OF 34.0 TO 34.9 IN ADULT: Chronic | Status: ACTIVE | Noted: 2023-05-13

## 2024-05-04 PROBLEM — E66.812 CLASS 2 SEVERE OBESITY DUE TO EXCESS CALORIES WITH SERIOUS COMORBIDITY AND BODY MASS INDEX (BMI) OF 36.0 TO 36.9 IN ADULT: Chronic | Status: RESOLVED | Noted: 2023-05-13 | Resolved: 2024-05-04

## 2024-05-04 PROBLEM — E66.811 CLASS 1 OBESITY DUE TO EXCESS CALORIES WITH SERIOUS COMORBIDITY AND BODY MASS INDEX (BMI) OF 34.0 TO 34.9 IN ADULT: Chronic | Status: ACTIVE | Noted: 2023-05-13

## 2024-05-04 NOTE — ASSESSMENT & PLAN NOTE
"Wt Readings from Last 3 Encounters:   05/03/24 0825 94.7 kg (208 lb 12.4 oz)   03/27/24 1437 94.8 kg (208 lb 15.9 oz)   12/04/23 1327 98.5 kg (217 lb 0.7 oz)      Estimated body mass index is 34.74 kg/m² as calculated from the following:    Height as of this encounter: 5' 5" (1.651 m).    Weight as of this encounter: 94.7 kg (208 lb 12.4 oz).  Ideal body weight: 57 kg (125 lb 10.6 oz)     The patient's BMI has been recorded in the chart. The patient has been provided educational materials regarding the benefits of attaining and maintaining a normal weight. We will continue to address and follow this issue during follow up visits.   "

## 2024-05-04 NOTE — ASSESSMENT & PLAN NOTE
Blood pressure is not at goal.  Unable to increase verapamil further due to pulse being in the 60s.  Will continue verapamil 180 milligrams daily and add valsartan 40 milligrams daily.  Recheck blood pressure in the next two weeks along with basic metabolic panel.

## 2024-05-17 ENCOUNTER — PATIENT OUTREACH (OUTPATIENT)
Dept: ADMINISTRATIVE | Facility: HOSPITAL | Age: 54
End: 2024-05-17
Payer: COMMERCIAL

## 2024-05-17 NOTE — LETTER
AUTHORIZATION FOR RELEASE OF   CONFIDENTIAL INFORMATION    Dear Pamela Soriano MD  ,    We are seeing Jennifer Bonilla, date of birth 1970, in the clinic at [facility]. Bossman Abdi Jr., MD is the patient's PCP. Jennifer Bonilla has an outstanding lab/procedure at the time we reviewed her chart. In order to help keep her health information updated, she has authorized us to request the following medical record(s):        (  )  MAMMOGRAM                                      ( X )  COLONOSCOPY      (  )  PAP SMEAR                                          (  )  OUTSIDE LAB RESULTS     (  )  DEXA SCAN                                          (  )  EYE EXAM            (  )  FOOT EXAM                                          (  )  ENTIRE RECORD     (  )  OUTSIDE IMMUNIZATIONS                 (  )  _______________         Please fax records to [facility], [place], Cesar R. Jr., MD, FAX [phone]           [facility]. [address] [facility] [address]        If you have any questions, please contact Juan M Regalado MA,[facility] at [phone].             Patient Name: Jennifer Bonilla  : 1970  Patient Phone #: [phone]

## 2024-05-21 ENCOUNTER — PATIENT OUTREACH (OUTPATIENT)
Dept: ADMINISTRATIVE | Facility: HOSPITAL | Age: 54
End: 2024-05-21
Payer: COMMERCIAL

## 2024-05-22 ENCOUNTER — CLINICAL SUPPORT (OUTPATIENT)
Dept: FAMILY MEDICINE | Facility: CLINIC | Age: 54
End: 2024-05-22
Payer: COMMERCIAL

## 2024-05-22 ENCOUNTER — LAB VISIT (OUTPATIENT)
Dept: LAB | Facility: HOSPITAL | Age: 54
End: 2024-05-22
Attending: FAMILY MEDICINE
Payer: COMMERCIAL

## 2024-05-22 VITALS — HEART RATE: 62 BPM | SYSTOLIC BLOOD PRESSURE: 128 MMHG | OXYGEN SATURATION: 98 % | DIASTOLIC BLOOD PRESSURE: 76 MMHG

## 2024-05-22 DIAGNOSIS — I10 BENIGN HYPERTENSION: Chronic | ICD-10-CM

## 2024-05-22 DIAGNOSIS — I10 BENIGN HYPERTENSION: Primary | ICD-10-CM

## 2024-05-22 LAB
ANION GAP SERPL CALC-SCNC: 8 MMOL/L (ref 8–16)
BUN SERPL-MCNC: 12 MG/DL (ref 6–20)
CALCIUM SERPL-MCNC: 9.7 MG/DL (ref 8.7–10.5)
CHLORIDE SERPL-SCNC: 109 MMOL/L (ref 95–110)
CO2 SERPL-SCNC: 25 MMOL/L (ref 23–29)
CREAT SERPL-MCNC: 0.9 MG/DL (ref 0.5–1.4)
EST. GFR  (NO RACE VARIABLE): >60 ML/MIN/1.73 M^2
GLUCOSE SERPL-MCNC: 78 MG/DL (ref 70–110)
POTASSIUM SERPL-SCNC: 4.7 MMOL/L (ref 3.5–5.1)
SODIUM SERPL-SCNC: 142 MMOL/L (ref 136–145)

## 2024-05-22 PROCEDURE — 36415 COLL VENOUS BLD VENIPUNCTURE: CPT | Mod: PN | Performed by: FAMILY MEDICINE

## 2024-05-22 PROCEDURE — 99999 PR PBB SHADOW E&M-EST. PATIENT-LVL II: CPT | Mod: PBBFAC,,,

## 2024-05-22 PROCEDURE — 80048 BASIC METABOLIC PNL TOTAL CA: CPT | Performed by: FAMILY MEDICINE

## 2024-05-22 NOTE — PROGRESS NOTES
Jennifer Bonilla 53 y.o. female is here today for Blood Pressure check.   History of HTN yes.    Review of patient's allergies indicates:   Allergen Reactions    Capsicum Blisters     Topical - caused blisters    Codeine Anaphylaxis    Zosyn [piperacillin-tazobactam] Anaphylaxis     Creatinine   Date Value Ref Range Status   05/03/2024 1.0 0.5 - 1.4 mg/dL Final     Sodium   Date Value Ref Range Status   05/03/2024 141 136 - 145 mmol/L Final     Potassium   Date Value Ref Range Status   05/03/2024 5.1 3.5 - 5.1 mmol/L Final   ]  Patient verifies taking blood pressure medications on a regular basis at the same time of the day.     Current Outpatient Medications:     estradiol-norethindrone (COMBIPATCH) 0.05-0.14 mg/24 hr, Change patch twice a week, Disp: 8 patch, Rfl: 11    valsartan (DIOVAN) 40 MG tablet, Take 1 tablet (40 mg total) by mouth once daily., Disp: 90 tablet, Rfl: 0    verapamiL (VERELAN) 180 MG C24P, Take 1 capsule (180 mg total) by mouth once daily., Disp: 90 capsule, Rfl: 2  Does patient have record of home blood pressure readings no.   Last dose of blood pressure medication was taken at 5/22/24.  Patient is asymptomatic.   Complains of none.    BP: 128/76 , Pulse: 62 .    Dr. Abdi will be  notified.

## 2024-06-06 ENCOUNTER — OFFICE VISIT (OUTPATIENT)
Dept: URGENT CARE | Facility: CLINIC | Age: 54
End: 2024-06-06
Payer: COMMERCIAL

## 2024-06-06 VITALS
WEIGHT: 210 LBS | OXYGEN SATURATION: 97 % | HEIGHT: 65 IN | SYSTOLIC BLOOD PRESSURE: 136 MMHG | TEMPERATURE: 99 F | BODY MASS INDEX: 34.99 KG/M2 | RESPIRATION RATE: 18 BRPM | HEART RATE: 78 BPM | DIASTOLIC BLOOD PRESSURE: 71 MMHG

## 2024-06-06 DIAGNOSIS — J32.9 BACTERIAL SINUSITIS: Primary | ICD-10-CM

## 2024-06-06 DIAGNOSIS — R05.9 COUGH, UNSPECIFIED TYPE: ICD-10-CM

## 2024-06-06 DIAGNOSIS — B96.89 BACTERIAL SINUSITIS: Primary | ICD-10-CM

## 2024-06-06 DIAGNOSIS — J02.9 SORE THROAT: ICD-10-CM

## 2024-06-06 LAB
CTP QC/QA: YES
MOLECULAR STREP A: NEGATIVE
POC MOLECULAR INFLUENZA A AGN: NEGATIVE
POC MOLECULAR INFLUENZA B AGN: NEGATIVE
SARS-COV-2 AG RESP QL IA.RAPID: NEGATIVE

## 2024-06-06 PROCEDURE — 87502 INFLUENZA DNA AMP PROBE: CPT | Mod: QW,S$GLB,, | Performed by: NURSE PRACTITIONER

## 2024-06-06 PROCEDURE — 99214 OFFICE O/P EST MOD 30 MIN: CPT | Mod: S$GLB,,, | Performed by: NURSE PRACTITIONER

## 2024-06-06 PROCEDURE — 87811 SARS-COV-2 COVID19 W/OPTIC: CPT | Mod: QW,S$GLB,, | Performed by: NURSE PRACTITIONER

## 2024-06-06 PROCEDURE — 87651 STREP A DNA AMP PROBE: CPT | Mod: QW,S$GLB,, | Performed by: NURSE PRACTITIONER

## 2024-06-06 RX ORDER — AZELASTINE 1 MG/ML
1 SPRAY, METERED NASAL 2 TIMES DAILY
Qty: 30 ML | Refills: 0 | Status: SHIPPED | OUTPATIENT
Start: 2024-06-06 | End: 2025-06-06

## 2024-06-06 RX ORDER — DOXYCYCLINE 100 MG/1
100 CAPSULE ORAL 2 TIMES DAILY
Qty: 14 CAPSULE | Refills: 0 | Status: SHIPPED | OUTPATIENT
Start: 2024-06-06 | End: 2024-06-13

## 2024-06-06 RX ORDER — PROMETHAZINE HYDROCHLORIDE AND DEXTROMETHORPHAN HYDROBROMIDE 6.25; 15 MG/5ML; MG/5ML
5 SYRUP ORAL EVERY 8 HOURS PRN
Qty: 118 ML | Refills: 0 | Status: SHIPPED | OUTPATIENT
Start: 2024-06-06 | End: 2024-06-14

## 2024-06-06 RX ORDER — LEVOCETIRIZINE DIHYDROCHLORIDE 5 MG/1
5 TABLET, FILM COATED ORAL NIGHTLY
Qty: 30 TABLET | Refills: 11 | Status: SHIPPED | OUTPATIENT
Start: 2024-06-06 | End: 2025-06-06

## 2024-06-06 RX ORDER — PREDNISONE 20 MG/1
20 TABLET ORAL DAILY
Qty: 5 TABLET | Refills: 0 | Status: SHIPPED | OUTPATIENT
Start: 2024-06-06 | End: 2024-06-11

## 2024-06-06 NOTE — LETTER
June 6, 2024      [facility]  [address] [address], [address]  [place] [address]  Phone: [phone]  Fax: [phone]       Patient: Jennifer Bonilla   YOB: 1970  Date of Visit: 06/06/2024    To Whom It May Concern:    Leslie Bonilla  was at [facility] on 06/06/2024. The patient may return to work on 06/10/2024 with no restrictions. If you have any questions or concerns, or if I can be of further assistance, please do not hesitate to contact me.    Sincerely,    Colby Montoya DNP FNP-C

## 2024-06-06 NOTE — PROGRESS NOTES
"Subjective:      Patient ID: Jennifer Bonilla is a 53 y.o. female.    Vitals:  height is 5' 5" (1.651 m) and weight is 95.3 kg (210 lb). Her oral temperature is 98.8 °F (37.1 °C). Her blood pressure is 136/71 and her pulse is 78. Her respiration is 18 and oxygen saturation is 97%.     Chief Complaint: Sinus Problem    54 y/o female presents with a complaint of a non-productive cough, sore throat, facial pain, sinus pressure, headaches, and fatigue that started 1.5 weeks ago. Reports ill-contacts at work.    Sinus Problem  This is a new problem. Episode onset: 1.5 weeks ago. The problem has been gradually worsening since onset. Maximum temperature: unmeasured. Her pain is at a severity of 5/10 (sore throat). The pain is moderate. Associated symptoms include chills, congestion, coughing, diaphoresis, headaches, sinus pressure and a sore throat. Pertinent negatives include no ear pain, hoarse voice, neck pain, shortness of breath, sneezing or swollen glands. Treatments tried: tylenol, cough drops, OTC cough meds. The treatment provided no relief.       Constitution: Positive for chills, sweating and fatigue. Negative for fever (unmeasured), generalized weakness and international travel in last 60 days.   HENT:  Positive for congestion, postnasal drip, sinus pain, sinus pressure and sore throat. Negative for ear pain, trouble swallowing and voice change.    Neck: Negative for neck pain.   Cardiovascular:  Negative for chest pain and palpitations.   Respiratory:  Positive for cough and sputum production. Negative for chest tightness, bloody sputum, COPD, shortness of breath, stridor, wheezing and asthma.    Gastrointestinal:  Negative for abdominal pain, nausea and vomiting.   Skin:  Negative for rash.   Allergic/Immunologic: Negative for asthma and sneezing.   Neurological:  Positive for headaches. Negative for dizziness and history of migraines.      Objective:     Physical Exam   Constitutional: She is oriented " to person, place, and time. She appears well-developed. She is cooperative.  Non-toxic appearance. She does not appear ill. No distress.   HENT:   Head: Normocephalic and atraumatic.   Ears:   Right Ear: Hearing, tympanic membrane, external ear and ear canal normal. no impacted cerumen  Left Ear: Hearing, tympanic membrane, external ear and ear canal normal. no impacted cerumen  Nose: Mucosal edema, rhinorrhea, sinus tenderness and congestion present. No nasal deformity. No epistaxis. Right sinus exhibits maxillary sinus tenderness. Right sinus exhibits no frontal sinus tenderness. Left sinus exhibits maxillary sinus tenderness. Left sinus exhibits no frontal sinus tenderness.   Mouth/Throat: Uvula is midline, oropharynx is clear and moist and mucous membranes are normal. No trismus in the jaw. Normal dentition. No uvula swelling. No oropharyngeal exudate, posterior oropharyngeal edema or posterior oropharyngeal erythema.   Eyes: Conjunctivae and lids are normal. No scleral icterus.   Neck: Trachea normal and phonation normal. Neck supple. No edema present. No erythema present. No neck rigidity present.   Cardiovascular: Normal rate, regular rhythm, normal heart sounds and normal pulses.   Pulmonary/Chest: Effort normal and breath sounds normal. No stridor. No respiratory distress. She has no decreased breath sounds. She has no wheezes. She has no rhonchi. She has no rales. She exhibits no tenderness.   Abdominal: Normal appearance.   Musculoskeletal: Normal range of motion.         General: No deformity. Normal range of motion.   Neurological: She is alert and oriented to person, place, and time. She exhibits normal muscle tone. Coordination normal.   Skin: Skin is warm, dry, intact, not diaphoretic and not pale.   Psychiatric: Her speech is normal and behavior is normal. Judgment and thought content normal.   Nursing note and vitals reviewed.      Assessment:     1. Bacterial sinusitis    2. Sore throat    3.  Cough, unspecified type      Results for orders placed or performed in visit on 06/06/24   POCT Strep A, Molecular   Result Value Ref Range    Molecular Strep A, POC Negative Negative     Acceptable Yes    SARS Coronavirus 2 Antigen, POCT Manual Read   Result Value Ref Range    SARS Coronavirus 2 Antigen Negative Negative     Acceptable Yes    POCT Influenza A/B MOLECULAR   Result Value Ref Range    POC Molecular Influenza A Ag Negative Negative    POC Molecular Influenza B Ag Negative Negative     Acceptable Yes       Plan:   Bacterial sinusitis  -     doxycycline (VIBRAMYCIN) 100 MG Cap; Take 1 capsule (100 mg total) by mouth 2 (two) times daily. for 7 days  Dispense: 14 capsule; Refill: 0  -     azelastine (ASTELIN) 137 mcg (0.1 %) nasal spray; 1 spray (137 mcg total) by Nasal route 2 (two) times daily.  Dispense: 30 mL; Refill: 0  -     predniSONE (DELTASONE) 20 MG tablet; Take 1 tablet (20 mg total) by mouth once daily. for 5 days  Dispense: 5 tablet; Refill: 0  -     levocetirizine (XYZAL) 5 MG tablet; Take 1 tablet (5 mg total) by mouth every evening.  Dispense: 30 tablet; Refill: 11    Sore throat  -     POCT Strep A, Molecular    Cough, unspecified type  -     SARS Coronavirus 2 Antigen, POCT Manual Read  -     POCT Influenza A/B MOLECULAR  -     promethazine-dextromethorphan (PROMETHAZINE-DM) 6.25-15 mg/5 mL Syrp; Take 5 mLs by mouth every 8 (eight) hours as needed (cough).  Dispense: 118 mL; Refill: 0    You can try breathe right strips at night to help you breathe.  A cool mist humidifier in bedroom may help with cough and relieve stuffy nose.     Sore throat:  Lozenge, hard candy or honey.      Sinus rinses DO NOT USE TAP WATER, if you must, water must be a rolling boil for 1 minute, let it cool, then use.  May use distilled water, or over the counter nasal saline rinses.  Vics vapor rub in shower to help open nasal passages.  May use nasal gel to keep  passages moisturized.  May use Nasal saline sprays during the day for added relief of congestion.   For those who go to the gym, please do not use the sauna or steam room now to clear sinuses.    During pollen season, change shirt if you are outside for a while when you go in.  Also wash your face.  Do not touch your face with your hands.  Wash your hands often in general while ill, avoid face contact with hands.     Over the counter you can use Tylenol (acetominophen) or Ibuprofen for your minor aches and pains as long as you have no contraindications.    Good nutrition. Lots of rest. Plenty of fluids       You must understand that you've received an Urgent Care treatment only and that you may be released before all your medical problems are known or treated. You, the patient, will arrange for follow up care as instructed.  Follow up with your PCP or specialty clinic as directed in the next 1-2 weeks if not improved or as needed.  You can call [phone] to schedule an appointment with the appropriate provider.  If your condition worsens we recommend that you receive another evaluation at the emergency room immediately or contact your primary medical clinics after hours call service to discuss your concerns.  Please return here or go to the Emergency Department for any concerns or worsening of condition.    If you were prescribed a narcotic or controlled medication, do not drive or operate heavy equipment or machinery while taking these medications.    Thank you for choosing [facility]!    Our goal in the Urgent Care is to always provide outstanding medical care. You may receive a survey by mail or e-mail in the next week regarding your experience today. We would greatly appreciate you completing and returning the survey. Your feedback provides us with a way to recognize our staff who provide very good care, and it helps us learn how to improve when your experience was below our aspiration of  excellence.      We appreciate you trusting us with your medical care. We hope you feel better soon. We will be happy to take care of you for all of your future medical needs.   This note was prepared using voice-recognition software.  Although efforts are made to proofread the note, some errors may persist in the final document.     Sincerely,    Colby Montoya DNP, FNP-C          Additional MDM:     Heart Failure Score:   COPD = No

## 2024-06-06 NOTE — PATIENT INSTRUCTIONS
You can try breathe right strips at night to help you breathe.  A cool mist humidifier in bedroom may help with cough and relieve stuffy nose.     Sore throat:  Lozenge, hard candy or honey.      Sinus rinses DO NOT USE TAP WATER, if you must, water must be a rolling boil for 1 minute, let it cool, then use.  May use distilled water, or over the counter nasal saline rinses.  Vics vapor rub in shower to help open nasal passages.  May use nasal gel to keep passages moisturized.  May use Nasal saline sprays during the day for added relief of congestion.   For those who go to the gym, please do not use the sauna or steam room now to clear sinuses.    During pollen season, change shirt if you are outside for a while when you go in.  Also wash your face.  Do not touch your face with your hands.  Wash your hands often in general while ill, avoid face contact with hands.     Over the counter you can use Tylenol (acetominophen) or Ibuprofen for your minor aches and pains as long as you have no contraindications.    Good nutrition. Lots of rest. Plenty of fluids       You must understand that you've received an Urgent Care treatment only and that you may be released before all your medical problems are known or treated. You, the patient, will arrange for follow up care as instructed.  Follow up with your PCP or specialty clinic as directed in the next 1-2 weeks if not improved or as needed.  You can call [phone] to schedule an appointment with the appropriate provider.  If your condition worsens we recommend that you receive another evaluation at the emergency room immediately or contact your primary medical clinics after hours call service to discuss your concerns.  Please return here or go to the Emergency Department for any concerns or worsening of condition.    If you were prescribed a narcotic or controlled medication, do not drive or operate heavy equipment or machinery while taking these medications.    Thank  you for choosing [facility]!    Our goal in the Urgent Care is to always provide outstanding medical care. You may receive a survey by mail or e-mail in the next week regarding your experience today. We would greatly appreciate you completing and returning the survey. Your feedback provides us with a way to recognize our staff who provide very good care, and it helps us learn how to improve when your experience was below our aspiration of excellence.      We appreciate you trusting us with your medical care. We hope you feel better soon. We will be happy to take care of you for all of your future medical needs.   This note was prepared using voice-recognition software.  Although efforts are made to proofread the note, some errors may persist in the final document.     Sincerely,    Colby Montoya DNP, FNP-C

## 2024-06-25 ENCOUNTER — HOSPITAL ENCOUNTER (OUTPATIENT)
Dept: RADIOLOGY | Facility: HOSPITAL | Age: 54
Discharge: HOME | End: 2024-06-25
Attending: FAMILY MEDICINE
Payer: COMMERCIAL

## 2024-06-25 DIAGNOSIS — Z12.31 ENCOUNTER FOR SCREENING MAMMOGRAM FOR MALIGNANT NEOPLASM OF BREAST: ICD-10-CM

## 2024-06-25 PROCEDURE — 77067 SCR MAMMO BI INCL CAD: CPT | Mod: TC

## 2024-07-16 ENCOUNTER — PATIENT OUTREACH (OUTPATIENT)
Dept: ADMINISTRATIVE | Facility: HOSPITAL | Age: 54
End: 2024-07-16
Payer: COMMERCIAL

## 2024-07-29 DIAGNOSIS — I10 BENIGN HYPERTENSION: Chronic | ICD-10-CM

## 2024-07-29 NOTE — TELEPHONE ENCOUNTER
No care due was identified.  [facility] Embedded Care Due Messages. Reference number: [id number].   7/29/2024 7:01:28 AM CDT

## 2024-07-30 NOTE — TELEPHONE ENCOUNTER
Refill Routing Note   Medication(s) are not appropriate for processing by [facility] for the following reason(s):        New or recently adjusted medication    [facility] action(s):  Defer             Appointments  past 12m or future 3m with PCP    Date Provider   Last Visit   5/3/2024 Bossman Abdi Jr., MD   Next Visit   11/5/2024 Bossman Abdi Jr., MD   ED visits in past 90 days: 0        Note composed:4:51 AM 07/30/2024

## 2024-07-31 RX ORDER — VALSARTAN 40 MG/1
40 TABLET ORAL
Qty: 90 TABLET | Refills: 3 | Status: SHIPPED | OUTPATIENT
Start: 2024-07-31

## 2024-10-10 LAB — CRC RECOMMENDATION EXT: NORMAL

## 2024-10-15 ENCOUNTER — PATIENT OUTREACH (OUTPATIENT)
Dept: ADMINISTRATIVE | Facility: HOSPITAL | Age: 54
End: 2024-10-15
Payer: COMMERCIAL

## 2025-02-09 DIAGNOSIS — I10 BENIGN HYPERTENSION: Chronic | ICD-10-CM

## 2025-02-09 NOTE — TELEPHONE ENCOUNTER
No care due was identified.  [facility] Embedded Care Due Messages. Reference number: [id number].   2/09/2025 8:01:19 AM CST

## 2025-02-10 RX ORDER — VERAPAMIL HYDROCHLORIDE 180 MG/1
180 CAPSULE, DELAYED RELEASE ORAL
Qty: 90 CAPSULE | Refills: 0 | Status: SHIPPED | OUTPATIENT
Start: 2025-02-10

## 2025-02-10 NOTE — TELEPHONE ENCOUNTER
Refill Decision Note   Jennifer Bonilla  is requesting a refill authorization.  Brief Assessment and Rationale for Refill:  Approve     Medication Therapy Plan:         Comments:     Note composed:3:43 AM 02/10/2025

## 2025-02-13 ENCOUNTER — TELEPHONE (OUTPATIENT)
Dept: OPHTHALMOLOGY | Facility: CLINIC | Age: 55
End: 2025-02-13
Payer: COMMERCIAL

## 2025-02-13 ENCOUNTER — OFFICE VISIT (OUTPATIENT)
Dept: OPTOMETRY | Facility: CLINIC | Age: 55
End: 2025-02-13
Payer: COMMERCIAL

## 2025-02-13 DIAGNOSIS — H10.9 CONJUNCTIVITIS OF RIGHT EYE, UNSPECIFIED CONJUNCTIVITIS TYPE: Primary | ICD-10-CM

## 2025-02-13 PROCEDURE — 99213 OFFICE O/P EST LOW 20 MIN: CPT | Mod: S$GLB,,, | Performed by: OPTOMETRIST

## 2025-02-13 PROCEDURE — 1160F RVW MEDS BY RX/DR IN RCRD: CPT | Mod: CPTII,S$GLB,, | Performed by: OPTOMETRIST

## 2025-02-13 PROCEDURE — 1159F MED LIST DOCD IN RCRD: CPT | Mod: CPTII,S$GLB,, | Performed by: OPTOMETRIST

## 2025-02-13 PROCEDURE — 99999 PR PBB SHADOW E&M-EST. PATIENT-LVL II: CPT | Mod: PBBFAC,,, | Performed by: OPTOMETRIST

## 2025-02-13 RX ORDER — PREDNISOLONE ACETATE 10 MG/ML
1 SUSPENSION/ DROPS OPHTHALMIC 4 TIMES DAILY
Qty: 5 ML | Refills: 0 | Status: SHIPPED | OUTPATIENT
Start: 2025-02-13

## 2025-02-13 NOTE — PROGRESS NOTES
Subjective:       Patient ID: Jennifer Bonilla is a 54 y.o. female      Chief Complaint   Patient presents with    Eye Problem     History of Present Illness    Dls: 1/22/24 Dr. Parkinson     55 y/o female presents today with c/o x 1 week red od itching od tearing od swollen lids od no changes in vision.   Pt states has not worn cls.     Eye meds  Tears ou prn        Assessment/Plan:     1. Conjunctivitis of right eye, unspecified conjunctivitis type (Primary)  No abrasion, no ulcer. CL holiday. Discard old lenses. No eye rubbing. PF QID OD x 1 week. Can continue zyrtec if needed. Recommend cool compresses PRN. Practice good hand hygiene.     - prednisoLONE acetate (PRED FORTE) 1 % DrpS; Place 1 drop into the right eye 4 (four) times daily.  Dispense: 5 mL; Refill: 0    Follow up if symptoms worsen or fail to improve.

## 2025-02-13 NOTE — TELEPHONE ENCOUNTER
----- Message from Dion sent at 2/13/2025  9:36 AM CST -----  Regarding: Consult/Advisory  Contact: [phone]  Consult/Advisory     Name Of Caller: Jennifer        Contact Preference: [phone]     Nature of call: Dr. Harris told her to speak with you about an appt.

## 2025-04-03 NOTE — INTERVAL H&P NOTE
Jennifer Bonilla was interviewed, examined and the H and P reviewed.  There has been no interval change in her History and Physical.     hide

## 2025-06-01 ENCOUNTER — OFFICE VISIT (OUTPATIENT)
Dept: URGENT CARE | Facility: CLINIC | Age: 55
End: 2025-06-01
Payer: COMMERCIAL

## 2025-06-01 VITALS
SYSTOLIC BLOOD PRESSURE: 144 MMHG | RESPIRATION RATE: 16 BRPM | OXYGEN SATURATION: 95 % | HEART RATE: 65 BPM | DIASTOLIC BLOOD PRESSURE: 87 MMHG | TEMPERATURE: 99 F | BODY MASS INDEX: 35.01 KG/M2 | HEIGHT: 65 IN | WEIGHT: 210.13 LBS

## 2025-06-01 DIAGNOSIS — B96.89 BACTERIAL URI: Primary | ICD-10-CM

## 2025-06-01 DIAGNOSIS — J06.9 BACTERIAL URI: Primary | ICD-10-CM

## 2025-06-01 PROCEDURE — 99214 OFFICE O/P EST MOD 30 MIN: CPT | Mod: S$GLB,,, | Performed by: PHYSICIAN ASSISTANT

## 2025-06-01 RX ORDER — AZITHROMYCIN 250 MG/1
TABLET, FILM COATED ORAL
Qty: 6 TABLET | Refills: 0 | Status: SHIPPED | OUTPATIENT
Start: 2025-06-01 | End: 2025-06-06

## 2025-06-01 RX ORDER — PROMETHAZINE HYDROCHLORIDE AND DEXTROMETHORPHAN HYDROBROMIDE 6.25; 15 MG/5ML; MG/5ML
5 SYRUP ORAL EVERY 4 HOURS PRN
Qty: 118 ML | Refills: 0 | Status: SHIPPED | OUTPATIENT
Start: 2025-06-01 | End: 2025-06-11

## 2025-06-01 RX ORDER — PREDNISONE 20 MG/1
40 TABLET ORAL DAILY
Qty: 8 TABLET | Refills: 0 | Status: SHIPPED | OUTPATIENT
Start: 2025-06-01 | End: 2025-06-05

## 2025-06-09 ENCOUNTER — PATIENT MESSAGE (OUTPATIENT)
Dept: ADMINISTRATIVE | Facility: HOSPITAL | Age: 55
End: 2025-06-09
Payer: COMMERCIAL

## 2025-06-30 ENCOUNTER — TELEPHONE (OUTPATIENT)
Dept: OBSTETRICS AND GYNECOLOGY | Facility: CLINIC | Age: 55
End: 2025-06-30
Payer: COMMERCIAL

## 2025-06-30 DIAGNOSIS — Z12.31 ENCOUNTER FOR SCREENING MAMMOGRAM FOR MALIGNANT NEOPLASM OF BREAST: Primary | ICD-10-CM

## 2025-06-30 NOTE — TELEPHONE ENCOUNTER
Copied from CRM #[id number]. Topic: Appointments - Amb Referral  >> Jun 30, 2025 11:46 AM Khang wrote:  .Name of Who is Calling:Pt                What is the request in detail: Pt calling asking for a mammo order, she have her annual schedule with you for 8/6/25.Please call back to further assist.                 Can the clinic reply by [facility]: No                What Number to Call Back if not in [facility]: [phone]

## 2025-06-30 NOTE — TELEPHONE ENCOUNTER
Copied from CRM #[id number]. Topic: Appointments - Amb Referral  >> Jun 30, 2025 11:46 AM Khang wrote:  .Name of Who is Calling:Pt                What is the request in detail: Pt calling asking for a mammo order, she have her annual schedule with you for 8/6/25.Please call back to further assist.

## 2025-07-10 ENCOUNTER — HOSPITAL ENCOUNTER (OUTPATIENT)
Dept: RADIOLOGY | Facility: HOSPITAL | Age: 55
Discharge: HOME | End: 2025-07-10
Payer: COMMERCIAL

## 2025-07-10 DIAGNOSIS — Z12.31 ENCOUNTER FOR SCREENING MAMMOGRAM FOR MALIGNANT NEOPLASM OF BREAST: ICD-10-CM

## 2025-07-10 PROCEDURE — 77067 SCR MAMMO BI INCL CAD: CPT | Mod: TC

## 2025-08-06 DIAGNOSIS — I10 BENIGN HYPERTENSION: ICD-10-CM

## 2025-08-22 ENCOUNTER — OFFICE VISIT (OUTPATIENT)
Dept: FAMILY MEDICINE | Facility: CLINIC | Age: 55
End: 2025-08-22
Payer: COMMERCIAL

## 2025-08-22 ENCOUNTER — LAB VISIT (OUTPATIENT)
Dept: LAB | Facility: HOSPITAL | Age: 55
End: 2025-08-22
Attending: FAMILY MEDICINE
Payer: COMMERCIAL

## 2025-08-22 VITALS
DIASTOLIC BLOOD PRESSURE: 88 MMHG | WEIGHT: 209.19 LBS | RESPIRATION RATE: 18 BRPM | HEIGHT: 65 IN | OXYGEN SATURATION: 98 % | TEMPERATURE: 98 F | HEART RATE: 75 BPM | SYSTOLIC BLOOD PRESSURE: 104 MMHG | BODY MASS INDEX: 34.85 KG/M2

## 2025-08-22 DIAGNOSIS — I10 BENIGN HYPERTENSION: ICD-10-CM

## 2025-08-22 DIAGNOSIS — K20.0 EOSINOPHILIC ESOPHAGITIS: ICD-10-CM

## 2025-08-22 DIAGNOSIS — Z23 NEED FOR VACCINATION: ICD-10-CM

## 2025-08-22 DIAGNOSIS — Z00.00 ROUTINE MEDICAL EXAM: Primary | ICD-10-CM

## 2025-08-22 DIAGNOSIS — I10 BENIGN HYPERTENSION: Chronic | ICD-10-CM

## 2025-08-22 DIAGNOSIS — Z00.00 ROUTINE MEDICAL EXAM: ICD-10-CM

## 2025-08-22 LAB
ABSOLUTE EOSINOPHIL (OHS): 0.39 K/UL
ABSOLUTE MONOCYTE (OHS): 0.74 K/UL (ref 0.3–1)
ABSOLUTE NEUTROPHIL COUNT (OHS): 3.2 K/UL (ref 1.8–7.7)
ALBUMIN SERPL BCP-MCNC: 4.2 G/DL (ref 3.5–5.2)
ALP SERPL-CCNC: 60 UNIT/L (ref 40–150)
ALT SERPL W/O P-5'-P-CCNC: 15 UNIT/L (ref 10–44)
ANION GAP (OHS): 9 MMOL/L (ref 8–16)
AST SERPL-CCNC: 18 UNIT/L (ref 11–45)
BASOPHILS # BLD AUTO: 0.06 K/UL
BASOPHILS NFR BLD AUTO: 0.7 %
BILIRUB SERPL-MCNC: 0.5 MG/DL (ref 0.1–1)
BUN SERPL-MCNC: 16 MG/DL (ref 6–20)
CALCIUM SERPL-MCNC: 9.4 MG/DL (ref 8.7–10.5)
CHLORIDE SERPL-SCNC: 106 MMOL/L (ref 95–110)
CHOLEST SERPL-MCNC: 230 MG/DL (ref 120–199)
CHOLEST/HDLC SERPL: 4.4 {RATIO} (ref 2–5)
CO2 SERPL-SCNC: 25 MMOL/L (ref 23–29)
CREAT SERPL-MCNC: 0.8 MG/DL (ref 0.5–1.4)
ERYTHROCYTE [DISTWIDTH] IN BLOOD BY AUTOMATED COUNT: 12.9 % (ref 11.5–14.5)
GFR SERPLBLD CREATININE-BSD FMLA CKD-EPI: >60 ML/MIN/1.73/M2
GLUCOSE SERPL-MCNC: 68 MG/DL (ref 70–110)
HCT VFR BLD AUTO: 42.5 % (ref 37–48.5)
HDLC SERPL-MCNC: 52 MG/DL (ref 40–75)
HDLC SERPL: 22.6 % (ref 20–50)
HGB BLD-MCNC: 14.6 GM/DL (ref 12–16)
IMM GRANULOCYTES # BLD AUTO: 0.06 K/UL (ref 0–0.04)
IMM GRANULOCYTES NFR BLD AUTO: 0.7 % (ref 0–0.5)
LDLC SERPL CALC-MCNC: 153.6 MG/DL (ref 63–159)
LYMPHOCYTES # BLD AUTO: 3.79 K/UL (ref 1–4.8)
MCH RBC QN AUTO: 31.2 PG (ref 27–31)
MCHC RBC AUTO-ENTMCNC: 34.4 G/DL (ref 32–36)
MCV RBC AUTO: 91 FL (ref 82–98)
NONHDLC SERPL-MCNC: 178 MG/DL
NUCLEATED RBC (/100WBC) (OHS): 0 /100 WBC
PLATELET # BLD AUTO: 253 K/UL (ref 150–450)
PMV BLD AUTO: 11.8 FL (ref 9.2–12.9)
POTASSIUM SERPL-SCNC: 4.3 MMOL/L (ref 3.5–5.1)
PROT SERPL-MCNC: 7.1 GM/DL (ref 6–8.4)
RBC # BLD AUTO: 4.68 M/UL (ref 4–5.4)
RELATIVE EOSINOPHIL (OHS): 4.7 %
RELATIVE LYMPHOCYTE (OHS): 46 % (ref 18–48)
RELATIVE MONOCYTE (OHS): 9 % (ref 4–15)
RELATIVE NEUTROPHIL (OHS): 38.9 % (ref 38–73)
SODIUM SERPL-SCNC: 140 MMOL/L (ref 136–145)
TRIGL SERPL-MCNC: 122 MG/DL (ref 30–150)
WBC # BLD AUTO: 8.24 K/UL (ref 3.9–12.7)

## 2025-08-22 PROCEDURE — 80053 COMPREHEN METABOLIC PANEL: CPT

## 2025-08-22 PROCEDURE — 80061 LIPID PANEL: CPT

## 2025-08-22 PROCEDURE — 83036 HEMOGLOBIN GLYCOSYLATED A1C: CPT

## 2025-08-22 PROCEDURE — 85025 COMPLETE CBC W/AUTO DIFF WBC: CPT

## 2025-08-22 PROCEDURE — 99396 PREV VISIT EST AGE 40-64: CPT | Mod: 25,S$GLB,, | Performed by: FAMILY MEDICINE

## 2025-08-22 PROCEDURE — 36415 COLL VENOUS BLD VENIPUNCTURE: CPT | Mod: PN

## 2025-08-22 PROCEDURE — 3074F SYST BP LT 130 MM HG: CPT | Mod: CPTII,S$GLB,, | Performed by: FAMILY MEDICINE

## 2025-08-22 PROCEDURE — 3079F DIAST BP 80-89 MM HG: CPT | Mod: CPTII,S$GLB,, | Performed by: FAMILY MEDICINE

## 2025-08-22 PROCEDURE — 1160F RVW MEDS BY RX/DR IN RCRD: CPT | Mod: CPTII,S$GLB,, | Performed by: FAMILY MEDICINE

## 2025-08-22 PROCEDURE — 3044F HG A1C LEVEL LT 7.0%: CPT | Mod: CPTII,S$GLB,, | Performed by: FAMILY MEDICINE

## 2025-08-22 PROCEDURE — 1159F MED LIST DOCD IN RCRD: CPT | Mod: CPTII,S$GLB,, | Performed by: FAMILY MEDICINE

## 2025-08-22 PROCEDURE — 3008F BODY MASS INDEX DOCD: CPT | Mod: CPTII,S$GLB,, | Performed by: FAMILY MEDICINE

## 2025-08-22 PROCEDURE — 4010F ACE/ARB THERAPY RXD/TAKEN: CPT | Mod: CPTII,S$GLB,, | Performed by: FAMILY MEDICINE

## 2025-08-22 PROCEDURE — 99999 PR PBB SHADOW E&M-EST. PATIENT-LVL IV: CPT | Mod: PBBFAC,,, | Performed by: FAMILY MEDICINE

## 2025-08-22 PROCEDURE — 90750 HZV VACC RECOMBINANT IM: CPT | Mod: S$GLB,,, | Performed by: FAMILY MEDICINE

## 2025-08-22 PROCEDURE — 90471 IMMUNIZATION ADMIN: CPT | Mod: S$GLB,,, | Performed by: FAMILY MEDICINE

## 2025-08-22 RX ORDER — VALSARTAN 40 MG/1
40 TABLET ORAL DAILY
Qty: 90 TABLET | Refills: 3 | Status: SHIPPED | OUTPATIENT
Start: 2025-08-22

## 2025-08-22 RX ORDER — VERAPAMIL HYDROCHLORIDE 180 MG/1
180 CAPSULE, DELAYED RELEASE ORAL DAILY
Qty: 90 CAPSULE | Refills: 0 | Status: SHIPPED | OUTPATIENT
Start: 2025-08-22

## 2025-08-22 RX ORDER — PANTOPRAZOLE SODIUM 40 MG/1
40 TABLET, DELAYED RELEASE ORAL EVERY MORNING
COMMUNITY

## 2025-08-23 LAB
EAG (OHS): 100 MG/DL (ref 68–131)
HBA1C MFR BLD: 5.1 % (ref 4–5.6)

## 2025-08-27 ENCOUNTER — PATIENT OUTREACH (OUTPATIENT)
Dept: ADMINISTRATIVE | Facility: HOSPITAL | Age: 55
End: 2025-08-27
Payer: COMMERCIAL

## 2025-08-27 ENCOUNTER — OFFICE VISIT (OUTPATIENT)
Dept: OBSTETRICS AND GYNECOLOGY | Facility: CLINIC | Age: 55
End: 2025-08-27
Payer: COMMERCIAL

## 2025-08-27 VITALS
BODY MASS INDEX: 34.82 KG/M2 | DIASTOLIC BLOOD PRESSURE: 89 MMHG | HEIGHT: 65 IN | WEIGHT: 209 LBS | SYSTOLIC BLOOD PRESSURE: 126 MMHG

## 2025-08-27 DIAGNOSIS — R10.2 PELVIC PAIN IN FEMALE: ICD-10-CM

## 2025-08-27 DIAGNOSIS — Z01.419 WOMEN'S ANNUAL ROUTINE GYNECOLOGICAL EXAMINATION: Primary | ICD-10-CM

## 2025-08-27 DIAGNOSIS — Z11.51 SCREENING FOR HPV (HUMAN PAPILLOMAVIRUS): ICD-10-CM

## 2025-08-27 DIAGNOSIS — Z12.4 ENCOUNTER FOR PAPANICOLAOU SMEAR FOR CERVICAL CANCER SCREENING: ICD-10-CM

## 2025-08-27 PROCEDURE — 99999 PR PBB SHADOW E&M-EST. PATIENT-LVL III: CPT | Mod: PBBFAC,,, | Performed by: NURSE PRACTITIONER

## 2025-08-27 PROCEDURE — 87624 HPV HI-RISK TYP POOLED RSLT: CPT | Performed by: NURSE PRACTITIONER

## 2025-09-03 ENCOUNTER — OFFICE VISIT (OUTPATIENT)
Dept: OPTOMETRY | Facility: CLINIC | Age: 55
End: 2025-09-03
Payer: COMMERCIAL

## 2025-09-03 DIAGNOSIS — H52.7 REFRACTIVE ERROR: ICD-10-CM

## 2025-09-03 DIAGNOSIS — Z97.3 WEARS CONTACT LENSES: ICD-10-CM

## 2025-09-03 DIAGNOSIS — Z46.0 ENCOUNTER FOR FITTING OR ADJUSTMENT OF SPECTACLES OR CONTACT LENSES: Primary | ICD-10-CM

## 2025-09-03 DIAGNOSIS — Z01.00 EXAMINATION OF EYES AND VISION: Primary | ICD-10-CM

## 2025-09-03 PROCEDURE — 99999 PR PBB SHADOW E&M-EST. PATIENT-LVL II: CPT | Mod: PBBFAC,,, | Performed by: OPTOMETRIST

## 2025-09-03 PROCEDURE — 3044F HG A1C LEVEL LT 7.0%: CPT | Mod: CPTII,S$GLB,, | Performed by: OPTOMETRIST

## 2025-09-03 PROCEDURE — 92310 CONTACT LENS FITTING OU: CPT | Mod: CSM,,, | Performed by: OPTOMETRIST

## 2025-09-03 PROCEDURE — 99499 UNLISTED E&M SERVICE: CPT | Mod: ,,, | Performed by: OPTOMETRIST

## 2025-09-03 PROCEDURE — 92015 DETERMINE REFRACTIVE STATE: CPT | Mod: S$GLB,,, | Performed by: OPTOMETRIST

## 2025-09-03 PROCEDURE — 92014 COMPRE OPH EXAM EST PT 1/>: CPT | Mod: S$GLB,,, | Performed by: OPTOMETRIST

## 2025-09-03 PROCEDURE — 4010F ACE/ARB THERAPY RXD/TAKEN: CPT | Mod: CPTII,S$GLB,, | Performed by: OPTOMETRIST

## 2025-09-05 ENCOUNTER — HOSPITAL ENCOUNTER (OUTPATIENT)
Dept: RADIOLOGY | Facility: HOSPITAL | Age: 55
Discharge: HOME | End: 2025-09-05
Attending: NURSE PRACTITIONER
Payer: COMMERCIAL

## 2025-09-05 DIAGNOSIS — R10.2 PELVIC PAIN IN FEMALE: ICD-10-CM

## 2025-09-05 PROCEDURE — 76856 US EXAM PELVIC COMPLETE: CPT | Mod: TC

## 2025-09-05 PROCEDURE — 76830 TRANSVAGINAL US NON-OB: CPT | Mod: 26,,, | Performed by: RADIOLOGY

## 2025-09-05 PROCEDURE — 76856 US EXAM PELVIC COMPLETE: CPT | Mod: 26,,, | Performed by: RADIOLOGY

## (undated) DEVICE — APPLICATOR CHLORAPREP ORN 26ML

## (undated) DEVICE — SUT ETHILON 3-0 PS2 18 BLK

## (undated) DEVICE — GAUZE SPONGE 4X4 12PLY

## (undated) DEVICE — SYR 10CC LUER LOCK

## (undated) DEVICE — BANDAGE ACE ELASTIC 6"

## (undated) DEVICE — SYR DISP LL 5CC

## (undated) DEVICE — PAD CAST SPECIALIST STRL 6

## (undated) DEVICE — BLADE ULTRACUT 3.5MM

## (undated) DEVICE — NDL 18GA X1 1/2 REG BEVEL

## (undated) DEVICE — SOL IRR NACL .9% 3000ML

## (undated) DEVICE — TRAY ARTHROSCOPY 2/CS

## (undated) DEVICE — TUBE SET INFLOW/OUTFLOW

## (undated) DEVICE — DRAPE STERI U-SHAPED 47X51IN

## (undated) DEVICE — DRAPE PLASTIC U 60X72

## (undated) DEVICE — NDL SPINAL 18GX3.5 SPINOCAN

## (undated) DEVICE — DRESSING XEROFORM STRL 2X2

## (undated) DEVICE — SEE MEDLINE ITEM 146298

## (undated) DEVICE — SYR 50CC LL

## (undated) DEVICE — DRESSING XEROFORM FOIL PK 1X8

## (undated) DEVICE — TAPE SURG DURAPORE 2 X10YD

## (undated) DEVICE — GAUZE SPONGE 4'X4 12 PLY

## (undated) DEVICE — WRAP KNEE DURA*SOFT W/2 INSERT